# Patient Record
Sex: MALE | Race: WHITE | NOT HISPANIC OR LATINO | Employment: OTHER | URBAN - METROPOLITAN AREA
[De-identification: names, ages, dates, MRNs, and addresses within clinical notes are randomized per-mention and may not be internally consistent; named-entity substitution may affect disease eponyms.]

---

## 2017-05-11 ENCOUNTER — TRANSCRIBE ORDERS (OUTPATIENT)
Dept: LAB | Facility: HOSPITAL | Age: 82
End: 2017-05-11

## 2017-05-11 DIAGNOSIS — Z85.46 PERSONAL HISTORY OF MALIGNANT NEOPLASM OF PROSTATE: ICD-10-CM

## 2017-05-11 DIAGNOSIS — C67.9 MALIGNANT NEOPLASM OF URINARY BLADDER, UNSPECIFIED SITE (HCC): Primary | ICD-10-CM

## 2017-05-11 DIAGNOSIS — C61 MALIGNANT NEOPLASM OF PROSTATE (HCC): ICD-10-CM

## 2017-05-11 DIAGNOSIS — I85.00 ESOPHAGEAL VARICES WITHOUT BLEEDING, UNSPECIFIED ESOPHAGEAL VARICES TYPE (HCC): ICD-10-CM

## 2017-05-24 ENCOUNTER — APPOINTMENT (OUTPATIENT)
Dept: LAB | Facility: HOSPITAL | Age: 82
End: 2017-05-24
Attending: SPECIALIST
Payer: MEDICARE

## 2017-05-24 DIAGNOSIS — Z85.46 PERSONAL HISTORY OF MALIGNANT NEOPLASM OF PROSTATE: ICD-10-CM

## 2017-05-24 DIAGNOSIS — C67.9 MALIGNANT NEOPLASM OF BLADDER (HCC): ICD-10-CM

## 2017-05-24 DIAGNOSIS — C61 MALIGNANT NEOPLASM OF PROSTATE (HCC): ICD-10-CM

## 2017-05-24 LAB
ALBUMIN SERPL BCP-MCNC: 3.3 G/DL (ref 3.5–5)
ALP SERPL-CCNC: 56 U/L (ref 46–116)
ALT SERPL W P-5'-P-CCNC: 17 U/L (ref 12–78)
ANION GAP SERPL CALCULATED.3IONS-SCNC: 8 MMOL/L (ref 4–13)
AST SERPL W P-5'-P-CCNC: 14 U/L (ref 5–45)
BASOPHILS # BLD AUTO: 0.02 THOUSANDS/ΜL (ref 0–0.1)
BASOPHILS NFR BLD AUTO: 0 % (ref 0–1)
BILIRUB SERPL-MCNC: 0.44 MG/DL (ref 0.2–1)
BUN SERPL-MCNC: 14 MG/DL (ref 5–25)
CALCIUM SERPL-MCNC: 8.9 MG/DL (ref 8.3–10.1)
CHLORIDE SERPL-SCNC: 109 MMOL/L (ref 100–108)
CO2 SERPL-SCNC: 26 MMOL/L (ref 21–32)
CREAT SERPL-MCNC: 0.98 MG/DL (ref 0.6–1.3)
EOSINOPHIL # BLD AUTO: 0.09 THOUSAND/ΜL (ref 0–0.61)
EOSINOPHIL NFR BLD AUTO: 2 % (ref 0–6)
ERYTHROCYTE [DISTWIDTH] IN BLOOD BY AUTOMATED COUNT: 14.1 % (ref 11.6–15.1)
GFR SERPL CREATININE-BSD FRML MDRD: >60 ML/MIN/1.73SQ M
GLUCOSE P FAST SERPL-MCNC: 75 MG/DL (ref 65–99)
HCT VFR BLD AUTO: 39.5 % (ref 36.5–49.3)
HGB BLD-MCNC: 12.2 G/DL (ref 12–17)
LYMPHOCYTES # BLD AUTO: 1.03 THOUSANDS/ΜL (ref 0.6–4.47)
LYMPHOCYTES NFR BLD AUTO: 18 % (ref 14–44)
MCH RBC QN AUTO: 29.3 PG (ref 26.8–34.3)
MCHC RBC AUTO-ENTMCNC: 30.9 G/DL (ref 31.4–37.4)
MCV RBC AUTO: 95 FL (ref 82–98)
MONOCYTES # BLD AUTO: 0.42 THOUSAND/ΜL (ref 0.17–1.22)
MONOCYTES NFR BLD AUTO: 7 % (ref 4–12)
NEUTROPHILS # BLD AUTO: 4.1 THOUSANDS/ΜL (ref 1.85–7.62)
NEUTS SEG NFR BLD AUTO: 73 % (ref 43–75)
NRBC BLD AUTO-RTO: 0 /100 WBCS
PLATELET # BLD AUTO: 153 THOUSANDS/UL (ref 149–390)
PMV BLD AUTO: 11.6 FL (ref 8.9–12.7)
POTASSIUM SERPL-SCNC: 4.1 MMOL/L (ref 3.5–5.3)
PROT SERPL-MCNC: 6.7 G/DL (ref 6.4–8.2)
PSA SERPL-MCNC: <0.1 NG/ML (ref 0–4)
RBC # BLD AUTO: 4.16 MILLION/UL (ref 3.88–5.62)
SODIUM SERPL-SCNC: 143 MMOL/L (ref 136–145)
VENIPUNCTURE: NORMAL
WBC # BLD AUTO: 5.67 THOUSAND/UL (ref 4.31–10.16)

## 2017-05-24 PROCEDURE — 36415 COLL VENOUS BLD VENIPUNCTURE: CPT

## 2017-05-24 PROCEDURE — 85025 COMPLETE CBC W/AUTO DIFF WBC: CPT

## 2017-05-24 PROCEDURE — 84153 ASSAY OF PSA TOTAL: CPT

## 2017-05-24 PROCEDURE — 80053 COMPREHEN METABOLIC PANEL: CPT

## 2017-05-26 ENCOUNTER — TRANSCRIBE ORDERS (OUTPATIENT)
Dept: ADMINISTRATIVE | Facility: HOSPITAL | Age: 82
End: 2017-05-26

## 2017-05-26 ENCOUNTER — HOSPITAL ENCOUNTER (OUTPATIENT)
Dept: ULTRASOUND IMAGING | Facility: HOSPITAL | Age: 82
Discharge: HOME | End: 2017-05-26
Attending: UROLOGY
Payer: MEDICARE

## 2017-05-26 DIAGNOSIS — C67.9 MALIGNANT NEOPLASM OF URINARY BLADDER, UNSPECIFIED SITE (HCC): ICD-10-CM

## 2017-05-26 PROCEDURE — 76770 US EXAM ABDO BACK WALL COMP: CPT

## 2017-06-07 ENCOUNTER — ALLSCRIPTS OFFICE VISIT (OUTPATIENT)
Dept: OTHER | Facility: OTHER | Age: 82
End: 2017-06-07

## 2017-06-13 ENCOUNTER — ALLSCRIPTS OFFICE VISIT (OUTPATIENT)
Dept: OTHER | Facility: OTHER | Age: 82
End: 2017-06-13

## 2017-06-29 ENCOUNTER — TRANSCRIBE ORDERS (OUTPATIENT)
Dept: ADMINISTRATIVE | Facility: HOSPITAL | Age: 82
End: 2017-06-29

## 2017-06-29 DIAGNOSIS — IMO0002 MASS: Primary | ICD-10-CM

## 2017-07-05 ENCOUNTER — TRANSCRIBE ORDERS (OUTPATIENT)
Dept: LAB | Facility: HOSPITAL | Age: 82
End: 2017-07-05

## 2017-07-05 DIAGNOSIS — C61 MALIGNANT NEOPLASM OF PROSTATE (HCC): ICD-10-CM

## 2017-07-05 DIAGNOSIS — K63.89 CECUM MASS: Primary | ICD-10-CM

## 2017-07-06 ENCOUNTER — APPOINTMENT (OUTPATIENT)
Dept: LAB | Facility: HOSPITAL | Age: 82
End: 2017-07-06
Attending: INTERNAL MEDICINE
Payer: MEDICARE

## 2017-07-06 DIAGNOSIS — K63.89 CECUM MASS: ICD-10-CM

## 2017-07-06 LAB
ALBUMIN SERPL BCP-MCNC: 3.3 G/DL (ref 3.5–5)
ALP SERPL-CCNC: 60 U/L (ref 46–116)
ALT SERPL W P-5'-P-CCNC: 16 U/L (ref 12–78)
ANION GAP SERPL CALCULATED.3IONS-SCNC: 4 MMOL/L (ref 4–13)
APTT PPP: 29 SECONDS (ref 23–35)
AST SERPL W P-5'-P-CCNC: 15 U/L (ref 5–45)
BASOPHILS # BLD AUTO: 0.02 THOUSANDS/ΜL (ref 0–0.1)
BASOPHILS NFR BLD AUTO: 0 % (ref 0–1)
BILIRUB SERPL-MCNC: 0.5 MG/DL (ref 0.2–1)
BUN SERPL-MCNC: 15 MG/DL (ref 5–25)
CALCIUM SERPL-MCNC: 8.7 MG/DL (ref 8.3–10.1)
CEA SERPL-MCNC: 1.8 NG/ML (ref 0–3)
CHLORIDE SERPL-SCNC: 107 MMOL/L (ref 100–108)
CO2 SERPL-SCNC: 28 MMOL/L (ref 21–32)
CREAT SERPL-MCNC: 0.96 MG/DL (ref 0.6–1.3)
EOSINOPHIL # BLD AUTO: 0.06 THOUSAND/ΜL (ref 0–0.61)
EOSINOPHIL NFR BLD AUTO: 1 % (ref 0–6)
ERYTHROCYTE [DISTWIDTH] IN BLOOD BY AUTOMATED COUNT: 14.3 % (ref 11.6–15.1)
GFR SERPL CREATININE-BSD FRML MDRD: >60 ML/MIN/1.73SQ M
GLUCOSE P FAST SERPL-MCNC: 88 MG/DL (ref 65–99)
HCT VFR BLD AUTO: 39.2 % (ref 36.5–49.3)
HGB BLD-MCNC: 12.2 G/DL (ref 12–17)
INR PPP: 0.95 (ref 0.86–1.16)
LYMPHOCYTES # BLD AUTO: 1.14 THOUSANDS/ΜL (ref 0.6–4.47)
LYMPHOCYTES NFR BLD AUTO: 17 % (ref 14–44)
MCH RBC QN AUTO: 29 PG (ref 26.8–34.3)
MCHC RBC AUTO-ENTMCNC: 31.1 G/DL (ref 31.4–37.4)
MCV RBC AUTO: 93 FL (ref 82–98)
MONOCYTES # BLD AUTO: 0.49 THOUSAND/ΜL (ref 0.17–1.22)
MONOCYTES NFR BLD AUTO: 7 % (ref 4–12)
NEUTROPHILS # BLD AUTO: 4.87 THOUSANDS/ΜL (ref 1.85–7.62)
NEUTS SEG NFR BLD AUTO: 75 % (ref 43–75)
NRBC BLD AUTO-RTO: 0 /100 WBCS
PLATELET # BLD AUTO: 154 THOUSANDS/UL (ref 149–390)
PMV BLD AUTO: 11.1 FL (ref 8.9–12.7)
POTASSIUM SERPL-SCNC: 4.2 MMOL/L (ref 3.5–5.3)
PROT SERPL-MCNC: 6.7 G/DL (ref 6.4–8.2)
PROTHROMBIN TIME: 12.7 SECONDS (ref 12.1–14.4)
RBC # BLD AUTO: 4.21 MILLION/UL (ref 3.88–5.62)
SODIUM SERPL-SCNC: 139 MMOL/L (ref 136–145)
VENIPUNCTURE: NORMAL
WBC # BLD AUTO: 6.6 THOUSAND/UL (ref 4.31–10.16)

## 2017-07-06 PROCEDURE — 85025 COMPLETE CBC W/AUTO DIFF WBC: CPT

## 2017-07-06 PROCEDURE — 36415 COLL VENOUS BLD VENIPUNCTURE: CPT

## 2017-07-06 PROCEDURE — 85610 PROTHROMBIN TIME: CPT

## 2017-07-06 PROCEDURE — 82378 CARCINOEMBRYONIC ANTIGEN: CPT

## 2017-07-06 PROCEDURE — 85730 THROMBOPLASTIN TIME PARTIAL: CPT

## 2017-07-06 PROCEDURE — 80053 COMPREHEN METABOLIC PANEL: CPT

## 2017-07-14 ENCOUNTER — HOSPITAL ENCOUNTER (OUTPATIENT)
Dept: CT IMAGING | Facility: HOSPITAL | Age: 82
Discharge: HOME | End: 2017-07-14
Attending: INTERNAL MEDICINE
Payer: MEDICARE

## 2017-07-14 DIAGNOSIS — IMO0002 MASS: ICD-10-CM

## 2017-07-14 PROCEDURE — 74177 CT ABD & PELVIS W/CONTRAST: CPT

## 2017-07-14 RX ADMIN — IOHEXOL 100 ML: 350 INJECTION, SOLUTION INTRAVENOUS at 11:35

## 2017-07-19 ENCOUNTER — OFFICE VISIT (OUTPATIENT)
Dept: LAB | Facility: CLINIC | Age: 82
End: 2017-07-19
Payer: MEDICARE

## 2017-07-19 ENCOUNTER — APPOINTMENT (OUTPATIENT)
Dept: LAB | Facility: CLINIC | Age: 82
End: 2017-07-19
Payer: MEDICARE

## 2017-07-19 ENCOUNTER — LAB REQUISITION (OUTPATIENT)
Dept: LAB | Facility: HOSPITAL | Age: 82
DRG: 330 | End: 2017-07-19
Payer: MEDICARE

## 2017-07-19 ENCOUNTER — LAB CONVERSION - ENCOUNTER (OUTPATIENT)
Dept: OTHER | Facility: OTHER | Age: 82
End: 2017-07-19

## 2017-07-19 ENCOUNTER — TRANSCRIBE ORDERS (OUTPATIENT)
Dept: LAB | Facility: CLINIC | Age: 82
End: 2017-07-19

## 2017-07-19 DIAGNOSIS — C18.9 MALIGNANT NEOPLASM OF COLON, UNSPECIFIED PART OF COLON (HCC): ICD-10-CM

## 2017-07-19 DIAGNOSIS — K43.2 INCISIONAL HERNIA WITHOUT MENTION OF OBSTRUCTION OR GANGRENE: ICD-10-CM

## 2017-07-19 DIAGNOSIS — C18.9 MALIGNANT NEOPLASM OF COLON (HCC): ICD-10-CM

## 2017-07-19 DIAGNOSIS — K43.2 INCISIONAL HERNIA, WITHOUT OBSTRUCTION OR GANGRENE: ICD-10-CM

## 2017-07-19 DIAGNOSIS — K43.2 INCISIONAL HERNIA WITHOUT MENTION OF OBSTRUCTION OR GANGRENE: Primary | ICD-10-CM

## 2017-07-19 LAB
ABO GROUP BLD: NORMAL
ALBUMIN SERPL BCP-MCNC: 3.3 G/DL (ref 3.5–5)
ALP SERPL-CCNC: 59 U/L (ref 46–116)
ALT SERPL W P-5'-P-CCNC: 16 U/L (ref 12–78)
ANION GAP SERPL CALCULATED.3IONS-SCNC: 9 MMOL/L (ref 4–13)
AST SERPL W P-5'-P-CCNC: 17 U/L (ref 5–45)
BASOPHILS # BLD AUTO: 0.02 THOUSANDS/ΜL (ref 0–0.1)
BASOPHILS NFR BLD AUTO: 0 % (ref 0–1)
BILIRUB SERPL-MCNC: 0.4 MG/DL (ref 0.2–1)
BLD GP AB SCN SERPL QL: NEGATIVE
BUN SERPL-MCNC: 16 MG/DL (ref 5–25)
CALCIUM SERPL-MCNC: 8.9 MG/DL (ref 8.3–10.1)
CHLORIDE SERPL-SCNC: 103 MMOL/L (ref 100–108)
CO2 SERPL-SCNC: 29 MMOL/L (ref 21–32)
CREAT SERPL-MCNC: 1.23 MG/DL (ref 0.6–1.3)
EOSINOPHIL # BLD AUTO: 0.08 THOUSAND/ΜL (ref 0–0.61)
EOSINOPHIL NFR BLD AUTO: 1 % (ref 0–6)
ERYTHROCYTE [DISTWIDTH] IN BLOOD BY AUTOMATED COUNT: 14.3 % (ref 11.6–15.1)
EST. AVERAGE GLUCOSE BLD GHB EST-MCNC: 105 MG/DL
GFR SERPL CREATININE-BSD FRML MDRD: 56.2 ML/MIN/1.73SQ M
GLUCOSE SERPL-MCNC: 92 MG/DL (ref 65–140)
HBA1C MFR BLD: 5.3 % (ref 4.2–6.3)
HCT VFR BLD AUTO: 40 % (ref 36.5–49.3)
HGB BLD-MCNC: 12.4 G/DL (ref 12–17)
LYMPHOCYTES # BLD AUTO: 1.01 THOUSANDS/ΜL (ref 0.6–4.47)
LYMPHOCYTES NFR BLD AUTO: 17 % (ref 14–44)
MCH RBC QN AUTO: 29.2 PG (ref 26.8–34.3)
MCHC RBC AUTO-ENTMCNC: 31 G/DL (ref 31.4–37.4)
MCV RBC AUTO: 94 FL (ref 82–98)
MONOCYTES # BLD AUTO: 0.45 THOUSAND/ΜL (ref 0.17–1.22)
MONOCYTES NFR BLD AUTO: 7 % (ref 4–12)
NEUTROPHILS # BLD AUTO: 4.54 THOUSANDS/ΜL (ref 1.85–7.62)
NEUTS SEG NFR BLD AUTO: 75 % (ref 43–75)
PLATELET # BLD AUTO: 158 THOUSANDS/UL (ref 149–390)
PMV BLD AUTO: 11.1 FL (ref 8.9–12.7)
POTASSIUM SERPL-SCNC: 3.6 MMOL/L (ref 3.5–5.3)
PROT SERPL-MCNC: 6.8 G/DL (ref 6.4–8.2)
RBC # BLD AUTO: 4.25 MILLION/UL (ref 3.88–5.62)
RH BLD: POSITIVE
SODIUM SERPL-SCNC: 141 MMOL/L (ref 136–145)
SPECIMEN EXPIRATION DATE: NORMAL
WBC # BLD AUTO: 6.1 THOUSAND/UL (ref 4.31–10.16)

## 2017-07-19 PROCEDURE — 83036 HEMOGLOBIN GLYCOSYLATED A1C: CPT

## 2017-07-19 PROCEDURE — 86850 RBC ANTIBODY SCREEN: CPT | Performed by: SURGERY

## 2017-07-19 PROCEDURE — 86900 BLOOD TYPING SEROLOGIC ABO: CPT | Performed by: SURGERY

## 2017-07-19 PROCEDURE — 86901 BLOOD TYPING SEROLOGIC RH(D): CPT | Performed by: SURGERY

## 2017-07-19 PROCEDURE — 80053 COMPREHEN METABOLIC PANEL: CPT

## 2017-07-19 PROCEDURE — 85025 COMPLETE CBC W/AUTO DIFF WBC: CPT

## 2017-07-19 PROCEDURE — 93005 ELECTROCARDIOGRAM TRACING: CPT

## 2017-07-19 PROCEDURE — 36415 COLL VENOUS BLD VENIPUNCTURE: CPT

## 2017-07-20 LAB
ATRIAL RATE: 72 BPM
P AXIS: 40 DEGREES
PR INTERVAL: 154 MS
QRS AXIS: -32 DEGREES
QRSD INTERVAL: 96 MS
QT INTERVAL: 396 MS
QTC INTERVAL: 396 MS
T WAVE AXIS: -61 DEGREES
VENTRICULAR RATE: 60 BPM

## 2017-07-20 RX ORDER — MELATONIN
1000 DAILY
COMMUNITY

## 2017-07-20 RX ORDER — MULTIVITAMIN
1 TABLET ORAL DAILY
COMMUNITY

## 2017-07-21 ENCOUNTER — HOSPITAL ENCOUNTER (INPATIENT)
Facility: HOSPITAL | Age: 82
LOS: 8 days | Discharge: NURSING, REHAB OR LONG-TERM CARE | DRG: 330 | End: 2017-07-29
Attending: SURGERY | Admitting: SURGERY
Payer: MEDICARE

## 2017-07-21 ENCOUNTER — ANESTHESIA EVENT (OUTPATIENT)
Dept: PERIOP | Facility: HOSPITAL | Age: 82
DRG: 330 | End: 2017-07-21
Payer: MEDICARE

## 2017-07-21 ENCOUNTER — ANESTHESIA (OUTPATIENT)
Dept: PERIOP | Facility: HOSPITAL | Age: 82
DRG: 330 | End: 2017-07-21
Payer: MEDICARE

## 2017-07-21 DIAGNOSIS — C18.9 MALIGNANT NEOPLASM OF COLON (HCC): ICD-10-CM

## 2017-07-21 DIAGNOSIS — K43.5 PARASTOMAL HERNIA WITHOUT OBSTRUCTION OR GANGRENE: ICD-10-CM

## 2017-07-21 PROBLEM — E78.5 HYPERLIPIDEMIA: Status: ACTIVE | Noted: 2017-07-21

## 2017-07-21 PROBLEM — C67.9 BLADDER CANCER (HCC): Status: ACTIVE | Noted: 2017-07-21

## 2017-07-21 PROBLEM — C80.1 CANCER (HCC): Status: ACTIVE | Noted: 2017-07-21

## 2017-07-21 PROBLEM — I10 HYPERTENSION: Status: ACTIVE | Noted: 2017-07-21

## 2017-07-21 PROBLEM — F01.50 VASCULAR DEMENTIA (HCC): Status: ACTIVE | Noted: 2017-07-21

## 2017-07-21 PROBLEM — I47.1 SVT (SUPRAVENTRICULAR TACHYCARDIA) (HCC): Status: ACTIVE | Noted: 2017-07-21

## 2017-07-21 LAB
ANION GAP SERPL CALCULATED.3IONS-SCNC: 11 MMOL/L (ref 4–13)
ARTERIAL PATENCY WRIST A: YES
BASE EXCESS BLDA CALC-SCNC: -6 MMOL/L (ref -2–3)
BASE EXCESS BLDA CALC-SCNC: -6 MMOL/L (ref -2–3)
BASE EXCESS BLDA CALC-SCNC: -8.6 MMOL/L
BUN SERPL-MCNC: 11 MG/DL (ref 5–25)
CA-I BLD-SCNC: 1.05 MMOL/L (ref 1.12–1.32)
CA-I BLD-SCNC: 1.11 MMOL/L (ref 1.12–1.32)
CA-I BLD-SCNC: 1.18 MMOL/L (ref 1.12–1.32)
CALCIUM SERPL-MCNC: 7.6 MG/DL (ref 8.3–10.1)
CHLORIDE SERPL-SCNC: 111 MMOL/L (ref 100–108)
CO2 SERPL-SCNC: 20 MMOL/L (ref 21–32)
CREAT SERPL-MCNC: 0.95 MG/DL (ref 0.6–1.3)
ERYTHROCYTE [DISTWIDTH] IN BLOOD BY AUTOMATED COUNT: 14.1 % (ref 11.6–15.1)
GFR SERPL CREATININE-BSD FRML MDRD: >60 ML/MIN/1.73SQ M
GLUCOSE SERPL-MCNC: 157 MG/DL (ref 65–140)
GLUCOSE SERPL-MCNC: 164 MG/DL (ref 65–140)
GLUCOSE SERPL-MCNC: 177 MG/DL (ref 65–140)
GLUCOSE SERPL-MCNC: 182 MG/DL (ref 65–140)
HCO3 BLDA-SCNC: 17.2 MMOL/L (ref 22–28)
HCO3 BLDA-SCNC: 20.7 MMOL/L (ref 22–28)
HCO3 BLDA-SCNC: 21.5 MMOL/L (ref 24–30)
HCT VFR BLD AUTO: 31 % (ref 36.5–49.3)
HCT VFR BLD CALC: 27 % (ref 36.5–49.3)
HCT VFR BLD CALC: 29 % (ref 36.5–49.3)
HGB BLD-MCNC: 9.9 G/DL (ref 12–17)
HGB BLDA-MCNC: 9.2 G/DL (ref 12–17)
HGB BLDA-MCNC: 9.9 G/DL (ref 12–17)
LACTATE SERPL-SCNC: 1.8 MMOL/L (ref 0.5–2)
MAGNESIUM SERPL-MCNC: 1.8 MG/DL (ref 1.6–2.6)
MCH RBC QN AUTO: 29.6 PG (ref 26.8–34.3)
MCHC RBC AUTO-ENTMCNC: 31.9 G/DL (ref 31.4–37.4)
MCV RBC AUTO: 93 FL (ref 82–98)
NASAL CANNULA: 2
O2 CT BLDA-SCNC: 14.8 ML/DL (ref 16–23)
OXYHGB MFR BLDA: 96.2 % (ref 94–97)
PCO2 BLD: 22 MMOL/L (ref 21–32)
PCO2 BLD: 23 MMOL/L (ref 21–32)
PCO2 BLD: 42.9 MM HG (ref 36–44)
PCO2 BLD: 48.3 MM HG (ref 42–50)
PCO2 BLDA: 36.7 MM HG (ref 36–44)
PH BLD: 7.26 [PH] (ref 7.3–7.4)
PH BLD: 7.29 [PH] (ref 7.35–7.45)
PH BLDA: 7.29 [PH] (ref 7.35–7.45)
PHOSPHATE SERPL-MCNC: 3.1 MG/DL (ref 2.3–4.1)
PLATELET # BLD AUTO: 129 THOUSANDS/UL (ref 149–390)
PMV BLD AUTO: 10.8 FL (ref 8.9–12.7)
PO2 BLD: 413 MM HG (ref 75–129)
PO2 BLD: 506 MM HG (ref 35–45)
PO2 BLDA: 104.5 MM HG (ref 75–129)
POTASSIUM BLD-SCNC: 3.7 MMOL/L (ref 3.5–5.3)
POTASSIUM BLD-SCNC: 3.9 MMOL/L (ref 3.5–5.3)
POTASSIUM SERPL-SCNC: 3.8 MMOL/L (ref 3.5–5.3)
RBC # BLD AUTO: 3.34 MILLION/UL (ref 3.88–5.62)
SAO2 % BLD FROM PO2: 100 % (ref 95–98)
SAO2 % BLD FROM PO2: 100 % (ref 95–98)
SODIUM BLD-SCNC: 140 MMOL/L (ref 136–145)
SODIUM BLD-SCNC: 141 MMOL/L (ref 136–145)
SODIUM SERPL-SCNC: 142 MMOL/L (ref 136–145)
SPECIMEN SOURCE: ABNORMAL
WBC # BLD AUTO: 9.96 THOUSAND/UL (ref 4.31–10.16)

## 2017-07-21 PROCEDURE — 88341 IMHCHEM/IMCYTCHM EA ADD ANTB: CPT | Performed by: SURGERY

## 2017-07-21 PROCEDURE — 82947 ASSAY GLUCOSE BLOOD QUANT: CPT

## 2017-07-21 PROCEDURE — 82805 BLOOD GASES W/O2 SATURATION: CPT | Performed by: SURGERY

## 2017-07-21 PROCEDURE — 0DJD4ZZ INSPECTION OF LOWER INTESTINAL TRACT, PERCUTANEOUS ENDOSCOPIC APPROACH: ICD-10-PCS | Performed by: SURGERY

## 2017-07-21 PROCEDURE — 84295 ASSAY OF SERUM SODIUM: CPT

## 2017-07-21 PROCEDURE — 82330 ASSAY OF CALCIUM: CPT | Performed by: SURGERY

## 2017-07-21 PROCEDURE — 83605 ASSAY OF LACTIC ACID: CPT | Performed by: SURGERY

## 2017-07-21 PROCEDURE — 94760 N-INVAS EAR/PLS OXIMETRY 1: CPT

## 2017-07-21 PROCEDURE — 88342 IMHCHEM/IMCYTCHM 1ST ANTB: CPT | Performed by: SURGERY

## 2017-07-21 PROCEDURE — 84100 ASSAY OF PHOSPHORUS: CPT | Performed by: SURGERY

## 2017-07-21 PROCEDURE — 80048 BASIC METABOLIC PNL TOTAL CA: CPT | Performed by: SURGERY

## 2017-07-21 PROCEDURE — 85027 COMPLETE CBC AUTOMATED: CPT | Performed by: SURGERY

## 2017-07-21 PROCEDURE — 85014 HEMATOCRIT: CPT

## 2017-07-21 PROCEDURE — 83735 ASSAY OF MAGNESIUM: CPT | Performed by: SURGERY

## 2017-07-21 PROCEDURE — 82330 ASSAY OF CALCIUM: CPT

## 2017-07-21 PROCEDURE — 0WQF0ZZ REPAIR ABDOMINAL WALL, OPEN APPROACH: ICD-10-PCS | Performed by: SURGERY

## 2017-07-21 PROCEDURE — 82948 REAGENT STRIP/BLOOD GLUCOSE: CPT

## 2017-07-21 PROCEDURE — 82803 BLOOD GASES ANY COMBINATION: CPT

## 2017-07-21 PROCEDURE — 84132 ASSAY OF SERUM POTASSIUM: CPT

## 2017-07-21 PROCEDURE — 0DTF0ZZ RESECTION OF RIGHT LARGE INTESTINE, OPEN APPROACH: ICD-10-PCS | Performed by: SURGERY

## 2017-07-21 PROCEDURE — 0DNW0ZZ RELEASE PERITONEUM, OPEN APPROACH: ICD-10-PCS | Performed by: SURGERY

## 2017-07-21 PROCEDURE — C1765 ADHESION BARRIER: HCPCS | Performed by: SURGERY

## 2017-07-21 PROCEDURE — 88309 TISSUE EXAM BY PATHOLOGIST: CPT | Performed by: SURGERY

## 2017-07-21 RX ORDER — METOPROLOL TARTRATE 5 MG/5ML
5 INJECTION INTRAVENOUS EVERY 6 HOURS
Status: DISCONTINUED | OUTPATIENT
Start: 2017-07-21 | End: 2017-07-22

## 2017-07-21 RX ORDER — ONDANSETRON 2 MG/ML
4 INJECTION INTRAMUSCULAR; INTRAVENOUS EVERY 4 HOURS PRN
Status: DISCONTINUED | OUTPATIENT
Start: 2017-07-21 | End: 2017-07-29 | Stop reason: HOSPADM

## 2017-07-21 RX ORDER — SODIUM CHLORIDE, SODIUM LACTATE, POTASSIUM CHLORIDE, CALCIUM CHLORIDE 600; 310; 30; 20 MG/100ML; MG/100ML; MG/100ML; MG/100ML
100 INJECTION, SOLUTION INTRAVENOUS CONTINUOUS
Status: DISCONTINUED | OUTPATIENT
Start: 2017-07-21 | End: 2017-07-22

## 2017-07-21 RX ORDER — ALBUMIN, HUMAN INJ 5% 5 %
SOLUTION INTRAVENOUS CONTINUOUS PRN
Status: DISCONTINUED | OUTPATIENT
Start: 2017-07-21 | End: 2017-07-21 | Stop reason: SURG

## 2017-07-21 RX ORDER — CALCIUM CHLORIDE 100 MG/ML
INJECTION INTRAVENOUS; INTRAVENTRICULAR AS NEEDED
Status: DISCONTINUED | OUTPATIENT
Start: 2017-07-21 | End: 2017-07-21 | Stop reason: SURG

## 2017-07-21 RX ORDER — HEPARIN SODIUM 5000 [USP'U]/ML
5000 INJECTION, SOLUTION INTRAVENOUS; SUBCUTANEOUS EVERY 12 HOURS SCHEDULED
Status: DISCONTINUED | OUTPATIENT
Start: 2017-07-21 | End: 2017-07-23

## 2017-07-21 RX ORDER — MAGNESIUM HYDROXIDE 1200 MG/15ML
LIQUID ORAL AS NEEDED
Status: DISCONTINUED | OUTPATIENT
Start: 2017-07-21 | End: 2017-07-21 | Stop reason: HOSPADM

## 2017-07-21 RX ORDER — ROPIVACAINE HYDROCHLORIDE 2 MG/ML
INJECTION, SOLUTION EPIDURAL; INFILTRATION; PERINEURAL AS NEEDED
Status: DISCONTINUED | OUTPATIENT
Start: 2017-07-21 | End: 2017-07-21

## 2017-07-21 RX ORDER — FENTANYL CITRATE/PF 50 MCG/ML
25 SYRINGE (ML) INJECTION
Status: COMPLETED | OUTPATIENT
Start: 2017-07-21 | End: 2017-07-21

## 2017-07-21 RX ORDER — SODIUM CHLORIDE, SODIUM LACTATE, POTASSIUM CHLORIDE, CALCIUM CHLORIDE 600; 310; 30; 20 MG/100ML; MG/100ML; MG/100ML; MG/100ML
20 INJECTION, SOLUTION INTRAVENOUS CONTINUOUS
Status: DISCONTINUED | OUTPATIENT
Start: 2017-07-21 | End: 2017-07-21

## 2017-07-21 RX ORDER — SODIUM CHLORIDE, SODIUM GLUCONATE, SODIUM ACETATE, POTASSIUM CHLORIDE, MAGNESIUM CHLORIDE, SODIUM PHOSPHATE, DIBASIC, AND POTASSIUM PHOSPHATE .53; .5; .37; .037; .03; .012; .00082 G/100ML; G/100ML; G/100ML; G/100ML; G/100ML; G/100ML; G/100ML
1000 INJECTION, SOLUTION INTRAVENOUS ONCE
Status: COMPLETED | OUTPATIENT
Start: 2017-07-21 | End: 2017-07-22

## 2017-07-21 RX ORDER — LIDOCAINE HYDROCHLORIDE AND EPINEPHRINE 15; 5 MG/ML; UG/ML
INJECTION, SOLUTION EPIDURAL AS NEEDED
Status: DISCONTINUED | OUTPATIENT
Start: 2017-07-21 | End: 2017-07-21 | Stop reason: SURG

## 2017-07-21 RX ORDER — FENTANYL CITRATE 50 UG/ML
INJECTION, SOLUTION INTRAMUSCULAR; INTRAVENOUS AS NEEDED
Status: DISCONTINUED | OUTPATIENT
Start: 2017-07-21 | End: 2017-07-21 | Stop reason: SURG

## 2017-07-21 RX ORDER — ROPIVACAINE HYDROCHLORIDE 2 MG/ML
INJECTION, SOLUTION EPIDURAL; INFILTRATION; PERINEURAL AS NEEDED
Status: DISCONTINUED | OUTPATIENT
Start: 2017-07-21 | End: 2017-07-21 | Stop reason: SURG

## 2017-07-21 RX ORDER — NALOXONE HYDROCHLORIDE 0.4 MG/ML
0.1 INJECTION, SOLUTION INTRAMUSCULAR; INTRAVENOUS; SUBCUTANEOUS AS NEEDED
Status: DISCONTINUED | OUTPATIENT
Start: 2017-07-21 | End: 2017-07-29 | Stop reason: HOSPADM

## 2017-07-21 RX ORDER — ROCURONIUM BROMIDE 10 MG/ML
INJECTION, SOLUTION INTRAVENOUS AS NEEDED
Status: DISCONTINUED | OUTPATIENT
Start: 2017-07-21 | End: 2017-07-21 | Stop reason: SURG

## 2017-07-21 RX ORDER — PROPOFOL 10 MG/ML
INJECTION, EMULSION INTRAVENOUS AS NEEDED
Status: DISCONTINUED | OUTPATIENT
Start: 2017-07-21 | End: 2017-07-21 | Stop reason: SURG

## 2017-07-21 RX ORDER — SODIUM CHLORIDE 9 MG/ML
INJECTION, SOLUTION INTRAVENOUS CONTINUOUS PRN
Status: DISCONTINUED | OUTPATIENT
Start: 2017-07-21 | End: 2017-07-21 | Stop reason: SURG

## 2017-07-21 RX ORDER — LIDOCAINE HYDROCHLORIDE 10 MG/ML
INJECTION, SOLUTION INFILTRATION; PERINEURAL AS NEEDED
Status: DISCONTINUED | OUTPATIENT
Start: 2017-07-21 | End: 2017-07-21 | Stop reason: SURG

## 2017-07-21 RX ORDER — POTASSIUM CHLORIDE 14.9 MG/ML
20 INJECTION INTRAVENOUS
Status: COMPLETED | OUTPATIENT
Start: 2017-07-21 | End: 2017-07-22

## 2017-07-21 RX ORDER — ONDANSETRON 2 MG/ML
INJECTION INTRAMUSCULAR; INTRAVENOUS AS NEEDED
Status: DISCONTINUED | OUTPATIENT
Start: 2017-07-21 | End: 2017-07-21 | Stop reason: SURG

## 2017-07-21 RX ORDER — ACETAMINOPHEN 325 MG/1
650 TABLET ORAL EVERY 6 HOURS PRN
Status: DISCONTINUED | OUTPATIENT
Start: 2017-07-21 | End: 2017-07-29 | Stop reason: HOSPADM

## 2017-07-21 RX ORDER — GLYCOPYRROLATE 0.2 MG/ML
INJECTION INTRAMUSCULAR; INTRAVENOUS AS NEEDED
Status: DISCONTINUED | OUTPATIENT
Start: 2017-07-21 | End: 2017-07-21 | Stop reason: SURG

## 2017-07-21 RX ORDER — SODIUM CHLORIDE, SODIUM LACTATE, POTASSIUM CHLORIDE, CALCIUM CHLORIDE 600; 310; 30; 20 MG/100ML; MG/100ML; MG/100ML; MG/100ML
50 INJECTION, SOLUTION INTRAVENOUS CONTINUOUS
Status: DISCONTINUED | OUTPATIENT
Start: 2017-07-21 | End: 2017-07-21

## 2017-07-21 RX ADMIN — CEFAZOLIN SODIUM 1000 MG: 1 SOLUTION INTRAVENOUS at 16:40

## 2017-07-21 RX ADMIN — LIDOCAINE HYDROCHLORIDE 50 MG: 10 INJECTION, SOLUTION INFILTRATION; PERINEURAL at 16:38

## 2017-07-21 RX ADMIN — ALBUMIN HUMAN: 0.05 INJECTION, SOLUTION INTRAVENOUS at 17:30

## 2017-07-21 RX ADMIN — CALCIUM CHLORIDE 0.5 G: 100 INJECTION PARENTERAL at 17:50

## 2017-07-21 RX ADMIN — SODIUM CHLORIDE: 0.9 INJECTION, SOLUTION INTRAVENOUS at 15:55

## 2017-07-21 RX ADMIN — CEFAZOLIN SODIUM 1000 MG: 1 SOLUTION INTRAVENOUS at 12:42

## 2017-07-21 RX ADMIN — SODIUM CHLORIDE, SODIUM LACTATE, POTASSIUM CHLORIDE, AND CALCIUM CHLORIDE: .6; .31; .03; .02 INJECTION, SOLUTION INTRAVENOUS at 15:55

## 2017-07-21 RX ADMIN — NEOSTIGMINE METHYLSULFATE 3 MG: 1 INJECTION, SOLUTION INTRAMUSCULAR; INTRAVENOUS; SUBCUTANEOUS at 18:15

## 2017-07-21 RX ADMIN — HYDROMORPHONE HYDROCHLORIDE 0.2 MG: 1 INJECTION, SOLUTION INTRAMUSCULAR; INTRAVENOUS; SUBCUTANEOUS at 18:15

## 2017-07-21 RX ADMIN — ROCURONIUM BROMIDE 30 MG: 10 INJECTION, SOLUTION INTRAVENOUS at 12:59

## 2017-07-21 RX ADMIN — FENTANYL CITRATE 25 MCG: 50 INJECTION INTRAMUSCULAR; INTRAVENOUS at 19:07

## 2017-07-21 RX ADMIN — ROPIVACAINE HYDROCHLORIDE 20 ML: 2 INJECTION, SOLUTION EPIDURAL; INFILTRATION at 18:05

## 2017-07-21 RX ADMIN — CALCIUM GLUCONATE 1 G: 94 INJECTION, SOLUTION INTRAVENOUS at 22:48

## 2017-07-21 RX ADMIN — ROCURONIUM BROMIDE 20 MG: 10 INJECTION, SOLUTION INTRAVENOUS at 16:28

## 2017-07-21 RX ADMIN — SODIUM CHLORIDE: 0.9 INJECTION, SOLUTION INTRAVENOUS at 17:40

## 2017-07-21 RX ADMIN — ROCURONIUM BROMIDE 20 MG: 10 INJECTION, SOLUTION INTRAVENOUS at 15:29

## 2017-07-21 RX ADMIN — POTASSIUM CHLORIDE 20 MEQ: 200 INJECTION, SOLUTION INTRAVENOUS at 21:12

## 2017-07-21 RX ADMIN — HYDROMORPHONE HYDROCHLORIDE 0.4 MG: 1 INJECTION, SOLUTION INTRAMUSCULAR; INTRAVENOUS; SUBCUTANEOUS at 18:20

## 2017-07-21 RX ADMIN — ONDANSETRON 4 MG: 2 INJECTION INTRAMUSCULAR; INTRAVENOUS at 15:20

## 2017-07-21 RX ADMIN — SODIUM CHLORIDE, SODIUM LACTATE, POTASSIUM CHLORIDE, AND CALCIUM CHLORIDE 20 ML/HR: .6; .31; .03; .02 INJECTION, SOLUTION INTRAVENOUS at 11:31

## 2017-07-21 RX ADMIN — Medication: at 19:00

## 2017-07-21 RX ADMIN — FENTANYL CITRATE 25 MCG: 50 INJECTION, SOLUTION INTRAMUSCULAR; INTRAVENOUS at 11:59

## 2017-07-21 RX ADMIN — GLYCOPYRROLATE 0.6 MG: 0.2 INJECTION, SOLUTION INTRAMUSCULAR; INTRAVENOUS at 18:15

## 2017-07-21 RX ADMIN — FENTANYL CITRATE 25 MCG: 50 INJECTION INTRAMUSCULAR; INTRAVENOUS at 19:00

## 2017-07-21 RX ADMIN — HYDROMORPHONE HYDROCHLORIDE 0.4 MG: 1 INJECTION, SOLUTION INTRAMUSCULAR; INTRAVENOUS; SUBCUTANEOUS at 18:10

## 2017-07-21 RX ADMIN — LIDOCAINE HYDROCHLORIDE 50 MG: 10 INJECTION, SOLUTION INFILTRATION; PERINEURAL at 12:15

## 2017-07-21 RX ADMIN — HYDROMORPHONE HYDROCHLORIDE 0.4 MG: 1 INJECTION, SOLUTION INTRAMUSCULAR; INTRAVENOUS; SUBCUTANEOUS at 18:08

## 2017-07-21 RX ADMIN — METRONIDAZOLE 500 MG: 500 SOLUTION INTRAVENOUS at 12:57

## 2017-07-21 RX ADMIN — FENTANYL CITRATE 50 MCG: 50 INJECTION, SOLUTION INTRAMUSCULAR; INTRAVENOUS at 12:15

## 2017-07-21 RX ADMIN — METOPROLOL TARTRATE 5 MG: 5 INJECTION INTRAVENOUS at 21:15

## 2017-07-21 RX ADMIN — DEXAMETHASONE SODIUM PHOSPHATE 4 MG: 10 INJECTION INTRAMUSCULAR; INTRAVENOUS at 13:13

## 2017-07-21 RX ADMIN — FENTANYL CITRATE 25 MCG: 50 INJECTION INTRAMUSCULAR; INTRAVENOUS at 19:40

## 2017-07-21 RX ADMIN — ALBUMIN HUMAN: 0.05 INJECTION, SOLUTION INTRAVENOUS at 17:45

## 2017-07-21 RX ADMIN — FENTANYL CITRATE 25 MCG: 50 INJECTION INTRAMUSCULAR; INTRAVENOUS at 19:25

## 2017-07-21 RX ADMIN — HYDROMORPHONE HYDROCHLORIDE 0.2 MG: 1 INJECTION, SOLUTION INTRAMUSCULAR; INTRAVENOUS; SUBCUTANEOUS at 18:35

## 2017-07-21 RX ADMIN — SODIUM CHLORIDE, SODIUM GLUCONATE, SODIUM ACETATE, POTASSIUM CHLORIDE, MAGNESIUM CHLORIDE, SODIUM PHOSPHATE, DIBASIC, AND POTASSIUM PHOSPHATE 1000 ML: .53; .5; .37; .037; .03; .012; .00082 INJECTION, SOLUTION INTRAVENOUS at 21:13

## 2017-07-21 RX ADMIN — FENTANYL CITRATE 25 MCG: 50 INJECTION, SOLUTION INTRAMUSCULAR; INTRAVENOUS at 18:00

## 2017-07-21 RX ADMIN — SODIUM CHLORIDE: 0.9 INJECTION, SOLUTION INTRAVENOUS at 12:16

## 2017-07-21 RX ADMIN — HEPARIN SODIUM 5000 UNITS: 5000 INJECTION, SOLUTION INTRAVENOUS; SUBCUTANEOUS at 21:15

## 2017-07-21 RX ADMIN — PROPOFOL 100 MG: 10 INJECTION, EMULSION INTRAVENOUS at 12:15

## 2017-07-21 RX ADMIN — HYDROMORPHONE HYDROCHLORIDE 0.2 MG: 1 INJECTION, SOLUTION INTRAMUSCULAR; INTRAVENOUS; SUBCUTANEOUS at 18:23

## 2017-07-21 RX ADMIN — SODIUM CHLORIDE, SODIUM LACTATE, POTASSIUM CHLORIDE, AND CALCIUM CHLORIDE 100 ML/HR: .6; .31; .03; .02 INJECTION, SOLUTION INTRAVENOUS at 19:56

## 2017-07-21 RX ADMIN — POTASSIUM CHLORIDE 20 MEQ: 200 INJECTION, SOLUTION INTRAVENOUS at 22:46

## 2017-07-21 RX ADMIN — ROCURONIUM BROMIDE 50 MG: 10 INJECTION, SOLUTION INTRAVENOUS at 12:16

## 2017-07-21 RX ADMIN — LIDOCAINE HYDROCHLORIDE AND EPINEPHRINE 5 ML: 15; 5 INJECTION, SOLUTION EPIDURAL at 12:49

## 2017-07-21 RX ADMIN — HYDROMORPHONE HYDROCHLORIDE 0.2 MG: 1 INJECTION, SOLUTION INTRAMUSCULAR; INTRAVENOUS; SUBCUTANEOUS at 18:30

## 2017-07-22 PROBLEM — K63.89 COLONIC MASS: Status: ACTIVE | Noted: 2017-07-22

## 2017-07-22 LAB
ANION GAP SERPL CALCULATED.3IONS-SCNC: 7 MMOL/L (ref 4–13)
ARTERIAL PATENCY WRIST A: YES
BASE EXCESS BLDA CALC-SCNC: -2.7 MMOL/L
BASE EXCESS BLDA CALC-SCNC: -4.3 MMOL/L
BUN SERPL-MCNC: 9 MG/DL (ref 5–25)
CALCIUM SERPL-MCNC: 7.7 MG/DL (ref 8.3–10.1)
CHLORIDE SERPL-SCNC: 110 MMOL/L (ref 100–108)
CO2 SERPL-SCNC: 25 MMOL/L (ref 21–32)
CREAT SERPL-MCNC: 0.95 MG/DL (ref 0.6–1.3)
ERYTHROCYTE [DISTWIDTH] IN BLOOD BY AUTOMATED COUNT: 14.1 % (ref 11.6–15.1)
GFR SERPL CREATININE-BSD FRML MDRD: >60 ML/MIN/1.73SQ M
GLUCOSE SERPL-MCNC: 115 MG/DL (ref 65–140)
HCO3 BLDA-SCNC: 20 MMOL/L (ref 22–28)
HCO3 BLDA-SCNC: 21.2 MMOL/L (ref 22–28)
HCT VFR BLD AUTO: 28.9 % (ref 36.5–49.3)
HGB BLD-MCNC: 9.4 G/DL (ref 12–17)
MAGNESIUM SERPL-MCNC: 1.9 MG/DL (ref 1.6–2.6)
MCH RBC QN AUTO: 30 PG (ref 26.8–34.3)
MCHC RBC AUTO-ENTMCNC: 32.5 G/DL (ref 31.4–37.4)
MCV RBC AUTO: 92 FL (ref 82–98)
NASAL CANNULA: 3
NASAL CANNULA: 3
O2 CT BLDA-SCNC: 13.9 ML/DL (ref 16–23)
O2 CT BLDA-SCNC: 14 ML/DL (ref 16–23)
OXYHGB MFR BLDA: 97.5 % (ref 94–97)
OXYHGB MFR BLDA: 97.8 % (ref 94–97)
PCO2 BLDA: 33.5 MM HG (ref 36–44)
PCO2 BLDA: 33.8 MM HG (ref 36–44)
PH BLDA: 7.39 [PH] (ref 7.35–7.45)
PH BLDA: 7.42 [PH] (ref 7.35–7.45)
PHOSPHATE SERPL-MCNC: 1.9 MG/DL (ref 2.3–4.1)
PLATELET # BLD AUTO: 111 THOUSANDS/UL (ref 149–390)
PMV BLD AUTO: 11 FL (ref 8.9–12.7)
PO2 BLDA: 111.7 MM HG (ref 75–129)
PO2 BLDA: 126.1 MM HG (ref 75–129)
POTASSIUM SERPL-SCNC: 4.1 MMOL/L (ref 3.5–5.3)
RBC # BLD AUTO: 3.13 MILLION/UL (ref 3.88–5.62)
SODIUM SERPL-SCNC: 142 MMOL/L (ref 136–145)
SPECIMEN SOURCE: ABNORMAL
WBC # BLD AUTO: 9.71 THOUSAND/UL (ref 4.31–10.16)

## 2017-07-22 PROCEDURE — 85027 COMPLETE CBC AUTOMATED: CPT | Performed by: SURGERY

## 2017-07-22 PROCEDURE — 82805 BLOOD GASES W/O2 SATURATION: CPT | Performed by: SURGERY

## 2017-07-22 PROCEDURE — 83735 ASSAY OF MAGNESIUM: CPT | Performed by: SURGERY

## 2017-07-22 PROCEDURE — 84100 ASSAY OF PHOSPHORUS: CPT | Performed by: SURGERY

## 2017-07-22 PROCEDURE — 80048 BASIC METABOLIC PNL TOTAL CA: CPT | Performed by: SURGERY

## 2017-07-22 PROCEDURE — 36600 WITHDRAWAL OF ARTERIAL BLOOD: CPT

## 2017-07-22 RX ORDER — SODIUM CHLORIDE, SODIUM GLUCONATE, SODIUM ACETATE, POTASSIUM CHLORIDE, MAGNESIUM CHLORIDE, SODIUM PHOSPHATE, DIBASIC, AND POTASSIUM PHOSPHATE .53; .5; .37; .037; .03; .012; .00082 G/100ML; G/100ML; G/100ML; G/100ML; G/100ML; G/100ML; G/100ML
500 INJECTION, SOLUTION INTRAVENOUS ONCE
Status: COMPLETED | OUTPATIENT
Start: 2017-07-22 | End: 2017-07-22

## 2017-07-22 RX ORDER — DEXTROSE, SODIUM CHLORIDE, AND POTASSIUM CHLORIDE 5; .45; .15 G/100ML; G/100ML; G/100ML
50 INJECTION INTRAVENOUS CONTINUOUS
Status: DISCONTINUED | OUTPATIENT
Start: 2017-07-22 | End: 2017-07-28

## 2017-07-22 RX ORDER — LANOLIN ALCOHOL/MO/W.PET/CERES
3 CREAM (GRAM) TOPICAL
Status: DISCONTINUED | OUTPATIENT
Start: 2017-07-22 | End: 2017-07-23

## 2017-07-22 RX ORDER — HALOPERIDOL 5 MG/ML
0.5 INJECTION INTRAMUSCULAR ONCE
Status: COMPLETED | OUTPATIENT
Start: 2017-07-22 | End: 2017-07-22

## 2017-07-22 RX ORDER — MAGNESIUM SULFATE 1 G/100ML
1 INJECTION INTRAVENOUS ONCE
Status: COMPLETED | OUTPATIENT
Start: 2017-07-22 | End: 2017-07-22

## 2017-07-22 RX ADMIN — HEPARIN SODIUM 5000 UNITS: 5000 INJECTION, SOLUTION INTRAVENOUS; SUBCUTANEOUS at 08:31

## 2017-07-22 RX ADMIN — HEPARIN SODIUM 5000 UNITS: 5000 INJECTION, SOLUTION INTRAVENOUS; SUBCUTANEOUS at 22:02

## 2017-07-22 RX ADMIN — POTASSIUM PHOSPHATE, MONOBASIC AND POTASSIUM PHOSPHATE, DIBASIC 12 MMOL: 224; 236 INJECTION, SOLUTION INTRAVENOUS at 08:31

## 2017-07-22 RX ADMIN — DEXTROSE, SODIUM CHLORIDE, AND POTASSIUM CHLORIDE 50 ML/HR: 5; .45; .15 INJECTION INTRAVENOUS at 23:46

## 2017-07-22 RX ADMIN — METOPROLOL TARTRATE 5 MG: 5 INJECTION INTRAVENOUS at 03:17

## 2017-07-22 RX ADMIN — HALOPERIDOL LACTATE 0.5 MG: 5 INJECTION, SOLUTION INTRAMUSCULAR at 23:13

## 2017-07-22 RX ADMIN — Medication: at 03:09

## 2017-07-22 RX ADMIN — SODIUM CHLORIDE, SODIUM LACTATE, POTASSIUM CHLORIDE, AND CALCIUM CHLORIDE 100 ML/HR: .6; .31; .03; .02 INJECTION, SOLUTION INTRAVENOUS at 04:19

## 2017-07-22 RX ADMIN — SODIUM CHLORIDE, SODIUM GLUCONATE, SODIUM ACETATE, POTASSIUM CHLORIDE, MAGNESIUM CHLORIDE, SODIUM PHOSPHATE, DIBASIC, AND POTASSIUM PHOSPHATE 500 ML: .53; .5; .37; .037; .03; .012; .00082 INJECTION, SOLUTION INTRAVENOUS at 01:05

## 2017-07-22 RX ADMIN — SODIUM CHLORIDE, SODIUM LACTATE, POTASSIUM CHLORIDE, AND CALCIUM CHLORIDE 100 ML/HR: .6; .31; .03; .02 INJECTION, SOLUTION INTRAVENOUS at 14:26

## 2017-07-22 RX ADMIN — METOPROLOL TARTRATE 12.5 MG: 25 TABLET ORAL at 21:59

## 2017-07-22 RX ADMIN — MELATONIN TAB 3 MG 3 MG: 3 TAB at 22:01

## 2017-07-22 RX ADMIN — MAGNESIUM SULFATE HEPTAHYDRATE 1 G: 1 INJECTION, SOLUTION INTRAVENOUS at 08:31

## 2017-07-23 ENCOUNTER — GENERIC CONVERSION - ENCOUNTER (OUTPATIENT)
Dept: OTHER | Facility: OTHER | Age: 82
End: 2017-07-23

## 2017-07-23 ENCOUNTER — APPOINTMENT (INPATIENT)
Dept: NON INVASIVE DIAGNOSTICS | Facility: HOSPITAL | Age: 82
DRG: 330 | End: 2017-07-23
Payer: MEDICARE

## 2017-07-23 LAB
ANION GAP SERPL CALCULATED.3IONS-SCNC: 9 MMOL/L (ref 4–13)
BASOPHILS # BLD MANUAL: 0 THOUSAND/UL (ref 0–0.1)
BASOPHILS NFR MAR MANUAL: 0 % (ref 0–1)
BUN SERPL-MCNC: 11 MG/DL (ref 5–25)
CALCIUM SERPL-MCNC: 8 MG/DL (ref 8.3–10.1)
CHLORIDE SERPL-SCNC: 109 MMOL/L (ref 100–108)
CO2 SERPL-SCNC: 23 MMOL/L (ref 21–32)
CREAT SERPL-MCNC: 0.88 MG/DL (ref 0.6–1.3)
EOSINOPHIL # BLD MANUAL: 0 THOUSAND/UL (ref 0–0.4)
EOSINOPHIL NFR BLD MANUAL: 0 % (ref 0–6)
ERYTHROCYTE [DISTWIDTH] IN BLOOD BY AUTOMATED COUNT: 14.5 % (ref 11.6–15.1)
GFR SERPL CREATININE-BSD FRML MDRD: >60 ML/MIN/1.73SQ M
GLUCOSE SERPL-MCNC: 130 MG/DL (ref 65–140)
HCT VFR BLD AUTO: 30.3 % (ref 36.5–49.3)
HGB BLD-MCNC: 9.6 G/DL (ref 12–17)
LYMPHOCYTES # BLD AUTO: 0.12 THOUSAND/UL (ref 0.6–4.47)
LYMPHOCYTES # BLD AUTO: 1 % (ref 14–44)
MACROCYTES BLD QL AUTO: PRESENT
MAGNESIUM SERPL-MCNC: 2.3 MG/DL (ref 1.6–2.6)
MCH RBC QN AUTO: 29.2 PG (ref 26.8–34.3)
MCHC RBC AUTO-ENTMCNC: 31.7 G/DL (ref 31.4–37.4)
MCV RBC AUTO: 92 FL (ref 82–98)
MONOCYTES # BLD AUTO: 0.81 THOUSAND/UL (ref 0–1.22)
MONOCYTES NFR BLD: 7 % (ref 4–12)
NEUTROPHILS # BLD MANUAL: 10.63 THOUSAND/UL (ref 1.85–7.62)
NEUTS BAND NFR BLD MANUAL: 2 % (ref 0–8)
NEUTS SEG NFR BLD AUTO: 90 % (ref 43–75)
NRBC BLD AUTO-RTO: 0 /100 WBCS
PHOSPHATE SERPL-MCNC: 2 MG/DL (ref 2.3–4.1)
PLATELET # BLD AUTO: 112 THOUSANDS/UL (ref 149–390)
PLATELET BLD QL SMEAR: ABNORMAL
PMV BLD AUTO: 11.3 FL (ref 8.9–12.7)
POIKILOCYTOSIS BLD QL SMEAR: PRESENT
POTASSIUM SERPL-SCNC: 3.8 MMOL/L (ref 3.5–5.3)
RBC # BLD AUTO: 3.29 MILLION/UL (ref 3.88–5.62)
RBC MORPH BLD: PRESENT
SODIUM SERPL-SCNC: 141 MMOL/L (ref 136–145)
WBC # BLD AUTO: 11.55 THOUSAND/UL (ref 4.31–10.16)

## 2017-07-23 PROCEDURE — C8929 TTE W OR WO FOL WCON,DOPPLER: HCPCS

## 2017-07-23 PROCEDURE — 84100 ASSAY OF PHOSPHORUS: CPT | Performed by: PHYSICIAN ASSISTANT

## 2017-07-23 PROCEDURE — 85007 BL SMEAR W/DIFF WBC COUNT: CPT | Performed by: PHYSICIAN ASSISTANT

## 2017-07-23 PROCEDURE — 94668 MNPJ CHEST WALL SBSQ: CPT

## 2017-07-23 PROCEDURE — 85027 COMPLETE CBC AUTOMATED: CPT | Performed by: PHYSICIAN ASSISTANT

## 2017-07-23 PROCEDURE — 83735 ASSAY OF MAGNESIUM: CPT | Performed by: PHYSICIAN ASSISTANT

## 2017-07-23 PROCEDURE — 80048 BASIC METABOLIC PNL TOTAL CA: CPT | Performed by: PHYSICIAN ASSISTANT

## 2017-07-23 PROCEDURE — 94760 N-INVAS EAR/PLS OXIMETRY 1: CPT

## 2017-07-23 RX ORDER — HEPARIN SODIUM 5000 [USP'U]/ML
5000 INJECTION, SOLUTION INTRAVENOUS; SUBCUTANEOUS EVERY 8 HOURS SCHEDULED
Status: DISCONTINUED | OUTPATIENT
Start: 2017-07-23 | End: 2017-07-26

## 2017-07-23 RX ORDER — LANOLIN ALCOHOL/MO/W.PET/CERES
3 CREAM (GRAM) TOPICAL
Status: DISCONTINUED | OUTPATIENT
Start: 2017-07-23 | End: 2017-07-25

## 2017-07-23 RX ADMIN — HEPARIN SODIUM 5000 UNITS: 5000 INJECTION, SOLUTION INTRAVENOUS; SUBCUTANEOUS at 21:38

## 2017-07-23 RX ADMIN — Medication: at 03:21

## 2017-07-23 RX ADMIN — PERFLUTREN 1 ML/MIN: 6.52 INJECTION, SUSPENSION INTRAVENOUS at 11:29

## 2017-07-23 RX ADMIN — HEPARIN SODIUM 5000 UNITS: 5000 INJECTION, SOLUTION INTRAVENOUS; SUBCUTANEOUS at 08:20

## 2017-07-23 RX ADMIN — ONDANSETRON 4 MG: 2 INJECTION INTRAMUSCULAR; INTRAVENOUS at 16:36

## 2017-07-23 RX ADMIN — METOPROLOL TARTRATE 12.5 MG: 25 TABLET ORAL at 08:19

## 2017-07-23 RX ADMIN — POTASSIUM PHOSPHATE, MONOBASIC AND POTASSIUM PHOSPHATE, DIBASIC 21 MMOL: 224; 236 INJECTION, SOLUTION INTRAVENOUS at 09:56

## 2017-07-23 RX ADMIN — METOPROLOL TARTRATE 12.5 MG: 25 TABLET ORAL at 21:38

## 2017-07-23 RX ADMIN — MELATONIN TAB 3 MG 3 MG: 3 TAB at 21:38

## 2017-07-23 RX ADMIN — HEPARIN SODIUM 5000 UNITS: 5000 INJECTION, SOLUTION INTRAVENOUS; SUBCUTANEOUS at 15:08

## 2017-07-23 RX ADMIN — DEXTROSE, SODIUM CHLORIDE, AND POTASSIUM CHLORIDE 50 ML/HR: 5; .45; .15 INJECTION INTRAVENOUS at 19:12

## 2017-07-24 LAB
ANION GAP SERPL CALCULATED.3IONS-SCNC: 10 MMOL/L (ref 4–13)
BASOPHILS # BLD AUTO: 0 THOUSANDS/ΜL (ref 0–0.1)
BASOPHILS NFR BLD AUTO: 0 % (ref 0–1)
BUN SERPL-MCNC: 15 MG/DL (ref 5–25)
CALCIUM SERPL-MCNC: 7.9 MG/DL (ref 8.3–10.1)
CHLORIDE SERPL-SCNC: 110 MMOL/L (ref 100–108)
CHOLEST SERPL-MCNC: 98 MG/DL (ref 50–200)
CO2 SERPL-SCNC: 20 MMOL/L (ref 21–32)
CREAT SERPL-MCNC: 0.83 MG/DL (ref 0.6–1.3)
EOSINOPHIL # BLD AUTO: 0 THOUSAND/ΜL (ref 0–0.61)
EOSINOPHIL NFR BLD AUTO: 0 % (ref 0–6)
ERYTHROCYTE [DISTWIDTH] IN BLOOD BY AUTOMATED COUNT: 14.8 % (ref 11.6–15.1)
GFR SERPL CREATININE-BSD FRML MDRD: >60 ML/MIN/1.73SQ M
GLUCOSE SERPL-MCNC: 93 MG/DL (ref 65–140)
HCT VFR BLD AUTO: 30.4 % (ref 36.5–49.3)
HDLC SERPL-MCNC: 36 MG/DL (ref 40–60)
HGB BLD-MCNC: 9.9 G/DL (ref 12–17)
LDLC SERPL CALC-MCNC: 43 MG/DL (ref 0–100)
LYMPHOCYTES # BLD AUTO: 0.53 THOUSANDS/ΜL (ref 0.6–4.47)
LYMPHOCYTES NFR BLD AUTO: 6 % (ref 14–44)
MCH RBC QN AUTO: 30.1 PG (ref 26.8–34.3)
MCHC RBC AUTO-ENTMCNC: 32.6 G/DL (ref 31.4–37.4)
MCV RBC AUTO: 92 FL (ref 82–98)
MONOCYTES # BLD AUTO: 0.87 THOUSAND/ΜL (ref 0.17–1.22)
MONOCYTES NFR BLD AUTO: 9 % (ref 4–12)
NEUTROPHILS # BLD AUTO: 8.11 THOUSANDS/ΜL (ref 1.85–7.62)
NEUTS SEG NFR BLD AUTO: 85 % (ref 43–75)
NRBC BLD AUTO-RTO: 0 /100 WBCS
PLATELET # BLD AUTO: 101 THOUSANDS/UL (ref 149–390)
PMV BLD AUTO: 11.7 FL (ref 8.9–12.7)
POTASSIUM SERPL-SCNC: 4.1 MMOL/L (ref 3.5–5.3)
PREALB SERPL-MCNC: 9.9 MG/DL (ref 18–40)
RBC # BLD AUTO: 3.29 MILLION/UL (ref 3.88–5.62)
SODIUM SERPL-SCNC: 140 MMOL/L (ref 136–145)
TRIGL SERPL-MCNC: 97 MG/DL
WBC # BLD AUTO: 9.58 THOUSAND/UL (ref 4.31–10.16)

## 2017-07-24 PROCEDURE — 97167 OT EVAL HIGH COMPLEX 60 MIN: CPT

## 2017-07-24 PROCEDURE — 85025 COMPLETE CBC W/AUTO DIFF WBC: CPT | Performed by: SURGERY

## 2017-07-24 PROCEDURE — G8978 MOBILITY CURRENT STATUS: HCPCS

## 2017-07-24 PROCEDURE — 80061 LIPID PANEL: CPT | Performed by: INTERNAL MEDICINE

## 2017-07-24 PROCEDURE — G8988 SELF CARE GOAL STATUS: HCPCS

## 2017-07-24 PROCEDURE — 97163 PT EVAL HIGH COMPLEX 45 MIN: CPT

## 2017-07-24 PROCEDURE — 94668 MNPJ CHEST WALL SBSQ: CPT

## 2017-07-24 PROCEDURE — G8987 SELF CARE CURRENT STATUS: HCPCS

## 2017-07-24 PROCEDURE — 80048 BASIC METABOLIC PNL TOTAL CA: CPT | Performed by: SURGERY

## 2017-07-24 PROCEDURE — G8979 MOBILITY GOAL STATUS: HCPCS

## 2017-07-24 PROCEDURE — 84134 ASSAY OF PREALBUMIN: CPT | Performed by: SURGERY

## 2017-07-24 RX ORDER — ATORVASTATIN CALCIUM 20 MG/1
20 TABLET, FILM COATED ORAL
Status: DISCONTINUED | OUTPATIENT
Start: 2017-07-24 | End: 2017-07-25

## 2017-07-24 RX ADMIN — HEPARIN SODIUM 5000 UNITS: 5000 INJECTION, SOLUTION INTRAVENOUS; SUBCUTANEOUS at 06:13

## 2017-07-24 RX ADMIN — Medication: at 06:45

## 2017-07-24 RX ADMIN — HEPARIN SODIUM 5000 UNITS: 5000 INJECTION, SOLUTION INTRAVENOUS; SUBCUTANEOUS at 21:20

## 2017-07-24 RX ADMIN — HEPARIN SODIUM 5000 UNITS: 5000 INJECTION, SOLUTION INTRAVENOUS; SUBCUTANEOUS at 13:44

## 2017-07-24 RX ADMIN — DEXTROSE, SODIUM CHLORIDE, AND POTASSIUM CHLORIDE 50 ML/HR: 5; .45; .15 INJECTION INTRAVENOUS at 13:50

## 2017-07-24 RX ADMIN — METOPROLOL TARTRATE 12.5 MG: 25 TABLET ORAL at 08:51

## 2017-07-25 ENCOUNTER — APPOINTMENT (INPATIENT)
Dept: RADIOLOGY | Facility: HOSPITAL | Age: 82
DRG: 330 | End: 2017-07-25
Payer: MEDICARE

## 2017-07-25 LAB
ANION GAP SERPL CALCULATED.3IONS-SCNC: 7 MMOL/L (ref 4–13)
BUN SERPL-MCNC: 18 MG/DL (ref 5–25)
CALCIUM SERPL-MCNC: 8.1 MG/DL (ref 8.3–10.1)
CHLORIDE SERPL-SCNC: 109 MMOL/L (ref 100–108)
CO2 SERPL-SCNC: 26 MMOL/L (ref 21–32)
CREAT SERPL-MCNC: 0.78 MG/DL (ref 0.6–1.3)
ERYTHROCYTE [DISTWIDTH] IN BLOOD BY AUTOMATED COUNT: 14.7 % (ref 11.6–15.1)
GFR SERPL CREATININE-BSD FRML MDRD: 83 ML/MIN/1.73SQ M
GLUCOSE SERPL-MCNC: 106 MG/DL (ref 65–140)
HCT VFR BLD AUTO: 31.4 % (ref 36.5–49.3)
HGB BLD-MCNC: 9.9 G/DL (ref 12–17)
MCH RBC QN AUTO: 29.1 PG (ref 26.8–34.3)
MCHC RBC AUTO-ENTMCNC: 31.5 G/DL (ref 31.4–37.4)
MCV RBC AUTO: 92 FL (ref 82–98)
PLATELET # BLD AUTO: 113 THOUSANDS/UL (ref 149–390)
PMV BLD AUTO: 11 FL (ref 8.9–12.7)
POTASSIUM SERPL-SCNC: 3.7 MMOL/L (ref 3.5–5.3)
RBC # BLD AUTO: 3.4 MILLION/UL (ref 3.88–5.62)
SODIUM SERPL-SCNC: 142 MMOL/L (ref 136–145)
WBC # BLD AUTO: 5.97 THOUSAND/UL (ref 4.31–10.16)

## 2017-07-25 PROCEDURE — 94668 MNPJ CHEST WALL SBSQ: CPT

## 2017-07-25 PROCEDURE — 80048 BASIC METABOLIC PNL TOTAL CA: CPT | Performed by: SURGERY

## 2017-07-25 PROCEDURE — C9113 INJ PANTOPRAZOLE SODIUM, VIA: HCPCS | Performed by: SURGERY

## 2017-07-25 PROCEDURE — 94760 N-INVAS EAR/PLS OXIMETRY 1: CPT

## 2017-07-25 PROCEDURE — 85027 COMPLETE CBC AUTOMATED: CPT | Performed by: SURGERY

## 2017-07-25 PROCEDURE — 74022 RADEX COMPL AQT ABD SERIES: CPT

## 2017-07-25 RX ORDER — PANTOPRAZOLE SODIUM 40 MG/1
40 INJECTION, POWDER, FOR SOLUTION INTRAVENOUS
Status: DISCONTINUED | OUTPATIENT
Start: 2017-07-25 | End: 2017-07-29 | Stop reason: ALTCHOICE

## 2017-07-25 RX ORDER — POTASSIUM CHLORIDE 29.8 MG/ML
40 INJECTION INTRAVENOUS ONCE
Status: DISCONTINUED | OUTPATIENT
Start: 2017-07-25 | End: 2017-07-25

## 2017-07-25 RX ORDER — POTASSIUM CHLORIDE 20MEQ/15ML
40 LIQUID (ML) ORAL ONCE
Status: COMPLETED | OUTPATIENT
Start: 2017-07-25 | End: 2017-07-25

## 2017-07-25 RX ADMIN — PANTOPRAZOLE SODIUM 40 MG: 40 INJECTION, POWDER, FOR SOLUTION INTRAVENOUS at 17:24

## 2017-07-25 RX ADMIN — METOPROLOL TARTRATE 12.5 MG: 25 TABLET ORAL at 09:39

## 2017-07-25 RX ADMIN — HEPARIN SODIUM 5000 UNITS: 5000 INJECTION, SOLUTION INTRAVENOUS; SUBCUTANEOUS at 14:27

## 2017-07-25 RX ADMIN — HEPARIN SODIUM 5000 UNITS: 5000 INJECTION, SOLUTION INTRAVENOUS; SUBCUTANEOUS at 22:03

## 2017-07-25 RX ADMIN — POTASSIUM CHLORIDE 40 MEQ: 20 SOLUTION ORAL at 09:45

## 2017-07-25 RX ADMIN — Medication: at 07:33

## 2017-07-25 RX ADMIN — HEPARIN SODIUM 5000 UNITS: 5000 INJECTION, SOLUTION INTRAVENOUS; SUBCUTANEOUS at 05:16

## 2017-07-25 RX ADMIN — DEXTROSE, SODIUM CHLORIDE, AND POTASSIUM CHLORIDE 50 ML/HR: 5; .45; .15 INJECTION INTRAVENOUS at 09:44

## 2017-07-26 LAB
ANION GAP SERPL CALCULATED.3IONS-SCNC: 6 MMOL/L (ref 4–13)
BASOPHILS # BLD AUTO: 0.01 THOUSANDS/ΜL (ref 0–0.1)
BASOPHILS NFR BLD AUTO: 0 % (ref 0–1)
BUN SERPL-MCNC: 16 MG/DL (ref 5–25)
CALCIUM SERPL-MCNC: 7.9 MG/DL (ref 8.3–10.1)
CHLORIDE SERPL-SCNC: 112 MMOL/L (ref 100–108)
CO2 SERPL-SCNC: 25 MMOL/L (ref 21–32)
CREAT SERPL-MCNC: 0.73 MG/DL (ref 0.6–1.3)
EOSINOPHIL # BLD AUTO: 0.04 THOUSAND/ΜL (ref 0–0.61)
EOSINOPHIL NFR BLD AUTO: 1 % (ref 0–6)
ERYTHROCYTE [DISTWIDTH] IN BLOOD BY AUTOMATED COUNT: 14.6 % (ref 11.6–15.1)
GFR SERPL CREATININE-BSD FRML MDRD: 86 ML/MIN/1.73SQ M
GLUCOSE SERPL-MCNC: 91 MG/DL (ref 65–140)
HCT VFR BLD AUTO: 30 % (ref 36.5–49.3)
HGB BLD-MCNC: 9.4 G/DL (ref 12–17)
LYMPHOCYTES # BLD AUTO: 0.77 THOUSANDS/ΜL (ref 0.6–4.47)
LYMPHOCYTES NFR BLD AUTO: 12 % (ref 14–44)
MCH RBC QN AUTO: 29 PG (ref 26.8–34.3)
MCHC RBC AUTO-ENTMCNC: 31.3 G/DL (ref 31.4–37.4)
MCV RBC AUTO: 93 FL (ref 82–98)
MONOCYTES # BLD AUTO: 1 THOUSAND/ΜL (ref 0.17–1.22)
MONOCYTES NFR BLD AUTO: 15 % (ref 4–12)
NEUTROPHILS # BLD AUTO: 4.67 THOUSANDS/ΜL (ref 1.85–7.62)
NEUTS SEG NFR BLD AUTO: 72 % (ref 43–75)
NRBC BLD AUTO-RTO: 0 /100 WBCS
PLATELET # BLD AUTO: 114 THOUSANDS/UL (ref 149–390)
PMV BLD AUTO: 11.5 FL (ref 8.9–12.7)
POTASSIUM SERPL-SCNC: 3.6 MMOL/L (ref 3.5–5.3)
RBC # BLD AUTO: 3.24 MILLION/UL (ref 3.88–5.62)
SODIUM SERPL-SCNC: 143 MMOL/L (ref 136–145)
WBC # BLD AUTO: 6.53 THOUSAND/UL (ref 4.31–10.16)

## 2017-07-26 PROCEDURE — 85025 COMPLETE CBC W/AUTO DIFF WBC: CPT | Performed by: STUDENT IN AN ORGANIZED HEALTH CARE EDUCATION/TRAINING PROGRAM

## 2017-07-26 PROCEDURE — 94668 MNPJ CHEST WALL SBSQ: CPT

## 2017-07-26 PROCEDURE — 80048 BASIC METABOLIC PNL TOTAL CA: CPT | Performed by: STUDENT IN AN ORGANIZED HEALTH CARE EDUCATION/TRAINING PROGRAM

## 2017-07-26 PROCEDURE — C9113 INJ PANTOPRAZOLE SODIUM, VIA: HCPCS | Performed by: SURGERY

## 2017-07-26 PROCEDURE — 97116 GAIT TRAINING THERAPY: CPT

## 2017-07-26 PROCEDURE — 97530 THERAPEUTIC ACTIVITIES: CPT

## 2017-07-26 RX ORDER — OXYCODONE HYDROCHLORIDE AND ACETAMINOPHEN 5; 325 MG/1; MG/1
2 TABLET ORAL EVERY 4 HOURS PRN
Status: DISCONTINUED | OUTPATIENT
Start: 2017-07-26 | End: 2017-07-29 | Stop reason: HOSPADM

## 2017-07-26 RX ORDER — OXYCODONE HYDROCHLORIDE AND ACETAMINOPHEN 5; 325 MG/1; MG/1
1 TABLET ORAL EVERY 4 HOURS PRN
Status: DISCONTINUED | OUTPATIENT
Start: 2017-07-26 | End: 2017-07-29 | Stop reason: HOSPADM

## 2017-07-26 RX ORDER — HEPARIN SODIUM 5000 [USP'U]/ML
5000 INJECTION, SOLUTION INTRAVENOUS; SUBCUTANEOUS EVERY 8 HOURS SCHEDULED
Status: DISCONTINUED | OUTPATIENT
Start: 2017-07-26 | End: 2017-07-28

## 2017-07-26 RX ADMIN — Medication: at 07:29

## 2017-07-26 RX ADMIN — DEXTROSE, SODIUM CHLORIDE, AND POTASSIUM CHLORIDE 50 ML/HR: 5; .45; .15 INJECTION INTRAVENOUS at 06:19

## 2017-07-26 RX ADMIN — METOPROLOL TARTRATE 12.5 MG: 25 TABLET ORAL at 12:27

## 2017-07-26 RX ADMIN — PANTOPRAZOLE SODIUM 40 MG: 40 INJECTION, POWDER, FOR SOLUTION INTRAVENOUS at 09:39

## 2017-07-27 PROCEDURE — C9113 INJ PANTOPRAZOLE SODIUM, VIA: HCPCS | Performed by: SURGERY

## 2017-07-27 PROCEDURE — 92610 EVALUATE SWALLOWING FUNCTION: CPT

## 2017-07-27 PROCEDURE — 97116 GAIT TRAINING THERAPY: CPT

## 2017-07-27 PROCEDURE — 97530 THERAPEUTIC ACTIVITIES: CPT

## 2017-07-27 PROCEDURE — 97112 NEUROMUSCULAR REEDUCATION: CPT

## 2017-07-27 PROCEDURE — 94668 MNPJ CHEST WALL SBSQ: CPT

## 2017-07-27 PROCEDURE — 94760 N-INVAS EAR/PLS OXIMETRY 1: CPT

## 2017-07-27 RX ORDER — POTASSIUM CHLORIDE 14.9 MG/ML
20 INJECTION INTRAVENOUS ONCE
Status: COMPLETED | OUTPATIENT
Start: 2017-07-27 | End: 2017-07-27

## 2017-07-27 RX ADMIN — DEXTROSE, SODIUM CHLORIDE, AND POTASSIUM CHLORIDE 50 ML/HR: 5; .45; .15 INJECTION INTRAVENOUS at 01:17

## 2017-07-27 RX ADMIN — HEPARIN SODIUM 5000 UNITS: 5000 INJECTION, SOLUTION INTRAVENOUS; SUBCUTANEOUS at 14:46

## 2017-07-27 RX ADMIN — POTASSIUM CHLORIDE 20 MEQ: 200 INJECTION, SOLUTION INTRAVENOUS at 01:12

## 2017-07-27 RX ADMIN — PANTOPRAZOLE SODIUM 40 MG: 40 INJECTION, POWDER, FOR SOLUTION INTRAVENOUS at 08:48

## 2017-07-27 RX ADMIN — HEPARIN SODIUM 5000 UNITS: 5000 INJECTION, SOLUTION INTRAVENOUS; SUBCUTANEOUS at 21:12

## 2017-07-27 RX ADMIN — METOPROLOL TARTRATE 12.5 MG: 25 TABLET ORAL at 08:48

## 2017-07-27 RX ADMIN — DEXTROSE, SODIUM CHLORIDE, AND POTASSIUM CHLORIDE 50 ML/HR: 5; .45; .15 INJECTION INTRAVENOUS at 22:29

## 2017-07-27 RX ADMIN — METOPROLOL TARTRATE 12.5 MG: 25 TABLET ORAL at 21:12

## 2017-07-27 RX ADMIN — HEPARIN SODIUM 5000 UNITS: 5000 INJECTION, SOLUTION INTRAVENOUS; SUBCUTANEOUS at 05:31

## 2017-07-28 ENCOUNTER — APPOINTMENT (INPATIENT)
Dept: PHYSICAL THERAPY | Facility: HOSPITAL | Age: 82
DRG: 330 | End: 2017-07-28
Payer: MEDICARE

## 2017-07-28 LAB
ANION GAP SERPL CALCULATED.3IONS-SCNC: 7 MMOL/L (ref 4–13)
BASOPHILS # BLD AUTO: 0.01 THOUSANDS/ΜL (ref 0–0.1)
BASOPHILS NFR BLD AUTO: 0 % (ref 0–1)
BUN SERPL-MCNC: 14 MG/DL (ref 5–25)
CALCIUM SERPL-MCNC: 7.6 MG/DL (ref 8.3–10.1)
CHLORIDE SERPL-SCNC: 111 MMOL/L (ref 100–108)
CO2 SERPL-SCNC: 24 MMOL/L (ref 21–32)
CREAT SERPL-MCNC: 0.71 MG/DL (ref 0.6–1.3)
EOSINOPHIL # BLD AUTO: 0.27 THOUSAND/ΜL (ref 0–0.61)
EOSINOPHIL NFR BLD AUTO: 3 % (ref 0–6)
ERYTHROCYTE [DISTWIDTH] IN BLOOD BY AUTOMATED COUNT: 14.4 % (ref 11.6–15.1)
GFR SERPL CREATININE-BSD FRML MDRD: 87 ML/MIN/1.73SQ M
GLUCOSE SERPL-MCNC: 99 MG/DL (ref 65–140)
HCT VFR BLD AUTO: 34.6 % (ref 36.5–49.3)
HGB BLD-MCNC: 10.8 G/DL (ref 12–17)
LYMPHOCYTES # BLD AUTO: 0.6 THOUSANDS/ΜL (ref 0.6–4.47)
LYMPHOCYTES NFR BLD AUTO: 7 % (ref 14–44)
MCH RBC QN AUTO: 29 PG (ref 26.8–34.3)
MCHC RBC AUTO-ENTMCNC: 31.2 G/DL (ref 31.4–37.4)
MCV RBC AUTO: 93 FL (ref 82–98)
MONOCYTES # BLD AUTO: 0.64 THOUSAND/ΜL (ref 0.17–1.22)
MONOCYTES NFR BLD AUTO: 7 % (ref 4–12)
NEUTROPHILS # BLD AUTO: 7.1 THOUSANDS/ΜL (ref 1.85–7.62)
NEUTS SEG NFR BLD AUTO: 83 % (ref 43–75)
NRBC BLD AUTO-RTO: 0 /100 WBCS
PLATELET # BLD AUTO: 145 THOUSANDS/UL (ref 149–390)
PMV BLD AUTO: 11.9 FL (ref 8.9–12.7)
POTASSIUM SERPL-SCNC: 3.1 MMOL/L (ref 3.5–5.3)
RBC # BLD AUTO: 3.73 MILLION/UL (ref 3.88–5.62)
SODIUM SERPL-SCNC: 142 MMOL/L (ref 136–145)
WBC # BLD AUTO: 8.68 THOUSAND/UL (ref 4.31–10.16)

## 2017-07-28 PROCEDURE — 80048 BASIC METABOLIC PNL TOTAL CA: CPT | Performed by: STUDENT IN AN ORGANIZED HEALTH CARE EDUCATION/TRAINING PROGRAM

## 2017-07-28 PROCEDURE — 97116 GAIT TRAINING THERAPY: CPT

## 2017-07-28 PROCEDURE — 94760 N-INVAS EAR/PLS OXIMETRY 1: CPT

## 2017-07-28 PROCEDURE — 94668 MNPJ CHEST WALL SBSQ: CPT

## 2017-07-28 PROCEDURE — 85025 COMPLETE CBC W/AUTO DIFF WBC: CPT | Performed by: STUDENT IN AN ORGANIZED HEALTH CARE EDUCATION/TRAINING PROGRAM

## 2017-07-28 PROCEDURE — 97112 NEUROMUSCULAR REEDUCATION: CPT

## 2017-07-28 PROCEDURE — C9113 INJ PANTOPRAZOLE SODIUM, VIA: HCPCS | Performed by: SURGERY

## 2017-07-28 RX ORDER — POTASSIUM CHLORIDE 14.9 MG/ML
20 INJECTION INTRAVENOUS ONCE
Status: COMPLETED | OUTPATIENT
Start: 2017-07-28 | End: 2017-07-28

## 2017-07-28 RX ORDER — HEPARIN SODIUM 5000 [USP'U]/ML
5000 INJECTION, SOLUTION INTRAVENOUS; SUBCUTANEOUS EVERY 12 HOURS SCHEDULED
Status: DISCONTINUED | OUTPATIENT
Start: 2017-07-28 | End: 2017-07-29 | Stop reason: HOSPADM

## 2017-07-28 RX ORDER — POTASSIUM CHLORIDE 20MEQ/15ML
40 LIQUID (ML) ORAL ONCE
Status: COMPLETED | OUTPATIENT
Start: 2017-07-28 | End: 2017-07-28

## 2017-07-28 RX ADMIN — METOPROLOL TARTRATE 12.5 MG: 25 TABLET ORAL at 23:04

## 2017-07-28 RX ADMIN — METOPROLOL TARTRATE 12.5 MG: 25 TABLET ORAL at 08:23

## 2017-07-28 RX ADMIN — HEPARIN SODIUM 5000 UNITS: 5000 INJECTION, SOLUTION INTRAVENOUS; SUBCUTANEOUS at 05:00

## 2017-07-28 RX ADMIN — HEPARIN SODIUM 5000 UNITS: 5000 INJECTION, SOLUTION INTRAVENOUS; SUBCUTANEOUS at 23:06

## 2017-07-28 RX ADMIN — ONDANSETRON 4 MG: 2 INJECTION INTRAMUSCULAR; INTRAVENOUS at 21:33

## 2017-07-28 RX ADMIN — PANTOPRAZOLE SODIUM 40 MG: 40 INJECTION, POWDER, FOR SOLUTION INTRAVENOUS at 08:23

## 2017-07-28 RX ADMIN — POTASSIUM CHLORIDE 40 MEQ: 20 SOLUTION ORAL at 08:23

## 2017-07-28 RX ADMIN — POTASSIUM CHLORIDE 20 MEQ: 200 INJECTION, SOLUTION INTRAVENOUS at 08:23

## 2017-07-28 RX ADMIN — POTASSIUM CHLORIDE 20 MEQ: 200 INJECTION, SOLUTION INTRAVENOUS at 10:20

## 2017-07-29 VITALS
SYSTOLIC BLOOD PRESSURE: 113 MMHG | BODY MASS INDEX: 24.39 KG/M2 | OXYGEN SATURATION: 97 % | RESPIRATION RATE: 18 BRPM | HEIGHT: 67 IN | HEART RATE: 93 BPM | DIASTOLIC BLOOD PRESSURE: 62 MMHG | WEIGHT: 155.42 LBS | TEMPERATURE: 98.8 F

## 2017-07-29 PROCEDURE — C9113 INJ PANTOPRAZOLE SODIUM, VIA: HCPCS | Performed by: SURGERY

## 2017-07-29 PROCEDURE — 94668 MNPJ CHEST WALL SBSQ: CPT

## 2017-07-29 RX ORDER — POTASSIUM CHLORIDE 20 MEQ/1
40 TABLET, EXTENDED RELEASE ORAL ONCE
Status: COMPLETED | OUTPATIENT
Start: 2017-07-29 | End: 2017-07-29

## 2017-07-29 RX ORDER — OXYCODONE HYDROCHLORIDE 5 MG/1
5 TABLET ORAL EVERY 4 HOURS PRN
Qty: 25 TABLET | Refills: 0 | Status: SHIPPED | OUTPATIENT
Start: 2017-07-29 | End: 2017-08-08

## 2017-07-29 RX ORDER — PANTOPRAZOLE SODIUM 40 MG/1
40 TABLET, DELAYED RELEASE ORAL
Status: DISCONTINUED | OUTPATIENT
Start: 2017-07-30 | End: 2017-07-29 | Stop reason: HOSPADM

## 2017-07-29 RX ADMIN — METOPROLOL TARTRATE 12.5 MG: 25 TABLET ORAL at 09:24

## 2017-07-29 RX ADMIN — OXYCODONE HYDROCHLORIDE AND ACETAMINOPHEN 1 TABLET: 5; 325 TABLET ORAL at 09:25

## 2017-07-29 RX ADMIN — HEPARIN SODIUM 5000 UNITS: 5000 INJECTION, SOLUTION INTRAVENOUS; SUBCUTANEOUS at 09:24

## 2017-07-29 RX ADMIN — PANTOPRAZOLE SODIUM 40 MG: 40 INJECTION, POWDER, FOR SOLUTION INTRAVENOUS at 09:24

## 2017-07-29 RX ADMIN — POTASSIUM CHLORIDE 40 MEQ: 1500 TABLET, EXTENDED RELEASE ORAL at 11:16

## 2017-07-29 RX ADMIN — OXYCODONE HYDROCHLORIDE AND ACETAMINOPHEN 1 TABLET: 5; 325 TABLET ORAL at 12:52

## 2017-09-01 ENCOUNTER — APPOINTMENT (EMERGENCY)
Dept: CT IMAGING | Facility: HOSPITAL | Age: 82
DRG: 863 | End: 2017-09-01
Payer: MEDICARE

## 2017-09-01 ENCOUNTER — APPOINTMENT (INPATIENT)
Dept: RADIOLOGY | Facility: HOSPITAL | Age: 82
DRG: 863 | End: 2017-09-01
Payer: MEDICARE

## 2017-09-01 ENCOUNTER — HOSPITAL ENCOUNTER (INPATIENT)
Facility: HOSPITAL | Age: 82
LOS: 2 days | Discharge: HOME HEALTH | DRG: 863 | End: 2017-09-03
Attending: EMERGENCY MEDICINE | Admitting: SURGERY
Payer: MEDICARE

## 2017-09-01 DIAGNOSIS — L02.211 ABSCESS OF ABDOMINAL WALL: Primary | ICD-10-CM

## 2017-09-01 DIAGNOSIS — IMO0001 POSTOPERATIVE WOUND INFECTION, INITIAL ENCOUNTER: ICD-10-CM

## 2017-09-01 LAB
ALBUMIN SERPL BCP-MCNC: 2.6 G/DL (ref 3.5–5)
ALP SERPL-CCNC: 69 U/L (ref 46–116)
ALT SERPL W P-5'-P-CCNC: 18 U/L (ref 12–78)
ANION GAP SERPL CALCULATED.3IONS-SCNC: 8 MMOL/L (ref 4–13)
APTT PPP: 33 SECONDS (ref 23–35)
AST SERPL W P-5'-P-CCNC: 15 U/L (ref 5–45)
BASOPHILS # BLD AUTO: 0.02 THOUSANDS/ΜL (ref 0–0.1)
BASOPHILS NFR BLD AUTO: 0 % (ref 0–1)
BILIRUB DIRECT SERPL-MCNC: 0.1 MG/DL (ref 0–0.2)
BILIRUB SERPL-MCNC: 0.5 MG/DL (ref 0.2–1)
BUN SERPL-MCNC: 11 MG/DL (ref 5–25)
CALCIUM SERPL-MCNC: 8.7 MG/DL (ref 8.3–10.1)
CHLORIDE SERPL-SCNC: 100 MMOL/L (ref 100–108)
CO2 SERPL-SCNC: 25 MMOL/L (ref 21–32)
CREAT SERPL-MCNC: 0.98 MG/DL (ref 0.6–1.3)
EOSINOPHIL # BLD AUTO: 0.02 THOUSAND/ΜL (ref 0–0.61)
EOSINOPHIL NFR BLD AUTO: 0 % (ref 0–6)
ERYTHROCYTE [DISTWIDTH] IN BLOOD BY AUTOMATED COUNT: 15.4 % (ref 11.6–15.1)
GFR SERPL CREATININE-BSD FRML MDRD: 71 ML/MIN/1.73SQ M
GLUCOSE SERPL-MCNC: 102 MG/DL (ref 65–140)
HCT VFR BLD AUTO: 36.4 % (ref 36.5–49.3)
HGB BLD-MCNC: 11 G/DL (ref 12–17)
INR PPP: 1.01 (ref 0.86–1.16)
LACTATE SERPL-SCNC: 0.8 MMOL/L (ref 0.5–2)
LYMPHOCYTES # BLD AUTO: 0.71 THOUSANDS/ΜL (ref 0.6–4.47)
LYMPHOCYTES NFR BLD AUTO: 8 % (ref 14–44)
MCH RBC QN AUTO: 27.1 PG (ref 26.8–34.3)
MCHC RBC AUTO-ENTMCNC: 30.2 G/DL (ref 31.4–37.4)
MCV RBC AUTO: 90 FL (ref 82–98)
MONOCYTES # BLD AUTO: 0.59 THOUSAND/ΜL (ref 0.17–1.22)
MONOCYTES NFR BLD AUTO: 6 % (ref 4–12)
NEUTROPHILS # BLD AUTO: 7.88 THOUSANDS/ΜL (ref 1.85–7.62)
NEUTS SEG NFR BLD AUTO: 86 % (ref 43–75)
PLATELET # BLD AUTO: 150 THOUSANDS/UL (ref 149–390)
PMV BLD AUTO: 10.4 FL (ref 8.9–12.7)
POTASSIUM SERPL-SCNC: 3.6 MMOL/L (ref 3.5–5.3)
PROT SERPL-MCNC: 7.2 G/DL (ref 6.4–8.2)
PROTHROMBIN TIME: 13.6 SECONDS (ref 12.1–14.4)
RBC # BLD AUTO: 4.06 MILLION/UL (ref 3.88–5.62)
SODIUM SERPL-SCNC: 133 MMOL/L (ref 136–145)
TROPONIN I SERPL-MCNC: <0.02 NG/ML
WBC # BLD AUTO: 9.22 THOUSAND/UL (ref 4.31–10.16)

## 2017-09-01 PROCEDURE — 85025 COMPLETE CBC W/AUTO DIFF WBC: CPT | Performed by: EMERGENCY MEDICINE

## 2017-09-01 PROCEDURE — 83605 ASSAY OF LACTIC ACID: CPT | Performed by: EMERGENCY MEDICINE

## 2017-09-01 PROCEDURE — 71020 HB CHEST X-RAY 2VW FRONTAL&LATL: CPT

## 2017-09-01 PROCEDURE — 36415 COLL VENOUS BLD VENIPUNCTURE: CPT | Performed by: EMERGENCY MEDICINE

## 2017-09-01 PROCEDURE — 80076 HEPATIC FUNCTION PANEL: CPT | Performed by: EMERGENCY MEDICINE

## 2017-09-01 PROCEDURE — 74177 CT ABD & PELVIS W/CONTRAST: CPT

## 2017-09-01 PROCEDURE — 93005 ELECTROCARDIOGRAM TRACING: CPT | Performed by: EMERGENCY MEDICINE

## 2017-09-01 PROCEDURE — 87040 BLOOD CULTURE FOR BACTERIA: CPT | Performed by: EMERGENCY MEDICINE

## 2017-09-01 PROCEDURE — 99285 EMERGENCY DEPT VISIT HI MDM: CPT

## 2017-09-01 PROCEDURE — 85730 THROMBOPLASTIN TIME PARTIAL: CPT | Performed by: EMERGENCY MEDICINE

## 2017-09-01 PROCEDURE — 96361 HYDRATE IV INFUSION ADD-ON: CPT

## 2017-09-01 PROCEDURE — 85610 PROTHROMBIN TIME: CPT | Performed by: EMERGENCY MEDICINE

## 2017-09-01 PROCEDURE — 96365 THER/PROPH/DIAG IV INF INIT: CPT

## 2017-09-01 PROCEDURE — 84484 ASSAY OF TROPONIN QUANT: CPT | Performed by: EMERGENCY MEDICINE

## 2017-09-01 PROCEDURE — 80048 BASIC METABOLIC PNL TOTAL CA: CPT | Performed by: EMERGENCY MEDICINE

## 2017-09-01 RX ORDER — MORPHINE SULFATE 2 MG/ML
2 INJECTION, SOLUTION INTRAMUSCULAR; INTRAVENOUS EVERY 4 HOURS PRN
Status: DISCONTINUED | OUTPATIENT
Start: 2017-09-01 | End: 2017-09-03 | Stop reason: HOSPADM

## 2017-09-01 RX ORDER — VANCOMYCIN HYDROCHLORIDE 1 G/200ML
15 INJECTION, SOLUTION INTRAVENOUS ONCE
Status: DISCONTINUED | OUTPATIENT
Start: 2017-09-01 | End: 2017-09-01

## 2017-09-01 RX ORDER — SODIUM CHLORIDE, SODIUM LACTATE, POTASSIUM CHLORIDE, CALCIUM CHLORIDE 600; 310; 30; 20 MG/100ML; MG/100ML; MG/100ML; MG/100ML
100 INJECTION, SOLUTION INTRAVENOUS CONTINUOUS
Status: DISCONTINUED | OUTPATIENT
Start: 2017-09-01 | End: 2017-09-02

## 2017-09-01 RX ORDER — ACETAMINOPHEN 325 MG/1
650 TABLET ORAL EVERY 6 HOURS PRN
Status: DISCONTINUED | OUTPATIENT
Start: 2017-09-01 | End: 2017-09-03 | Stop reason: HOSPADM

## 2017-09-01 RX ORDER — HEPARIN SODIUM 5000 [USP'U]/ML
5000 INJECTION, SOLUTION INTRAVENOUS; SUBCUTANEOUS EVERY 8 HOURS SCHEDULED
Status: DISCONTINUED | OUTPATIENT
Start: 2017-09-01 | End: 2017-09-02

## 2017-09-01 RX ORDER — HYDROCODONE BITARTRATE AND ACETAMINOPHEN 5; 325 MG/1; MG/1
1 TABLET ORAL EVERY 6 HOURS PRN
Status: DISCONTINUED | OUTPATIENT
Start: 2017-09-01 | End: 2017-09-03 | Stop reason: HOSPADM

## 2017-09-01 RX ADMIN — SODIUM CHLORIDE 1000 ML: 0.9 INJECTION, SOLUTION INTRAVENOUS at 19:07

## 2017-09-01 RX ADMIN — HEPARIN SODIUM 5000 UNITS: 5000 INJECTION, SOLUTION INTRAVENOUS; SUBCUTANEOUS at 21:54

## 2017-09-01 RX ADMIN — SODIUM CHLORIDE, SODIUM LACTATE, POTASSIUM CHLORIDE, AND CALCIUM CHLORIDE 100 ML/HR: .6; .31; .03; .02 INJECTION, SOLUTION INTRAVENOUS at 23:06

## 2017-09-01 RX ADMIN — SODIUM CHLORIDE 3 G: 9 INJECTION, SOLUTION INTRAVENOUS at 21:53

## 2017-09-01 RX ADMIN — VANCOMYCIN HYDROCHLORIDE 1000 MG: 1 INJECTION, SOLUTION INTRAVENOUS at 20:42

## 2017-09-01 RX ADMIN — CEFEPIME 2000 MG: 2 INJECTION, POWDER, FOR SOLUTION INTRAMUSCULAR; INTRAVENOUS at 19:48

## 2017-09-01 RX ADMIN — IOHEXOL 100 ML: 350 INJECTION, SOLUTION INTRAVENOUS at 19:39

## 2017-09-02 LAB
ANION GAP SERPL CALCULATED.3IONS-SCNC: 10 MMOL/L (ref 4–13)
ATRIAL RATE: 92 BPM
BASOPHILS # BLD AUTO: 0.02 THOUSANDS/ΜL (ref 0–0.1)
BASOPHILS NFR BLD AUTO: 0 % (ref 0–1)
BUN SERPL-MCNC: 9 MG/DL (ref 5–25)
CALCIUM SERPL-MCNC: 8 MG/DL (ref 8.3–10.1)
CHLORIDE SERPL-SCNC: 106 MMOL/L (ref 100–108)
CO2 SERPL-SCNC: 22 MMOL/L (ref 21–32)
CREAT SERPL-MCNC: 0.83 MG/DL (ref 0.6–1.3)
EOSINOPHIL # BLD AUTO: 0.02 THOUSAND/ΜL (ref 0–0.61)
EOSINOPHIL NFR BLD AUTO: 0 % (ref 0–6)
ERYTHROCYTE [DISTWIDTH] IN BLOOD BY AUTOMATED COUNT: 15.1 % (ref 11.6–15.1)
GFR SERPL CREATININE-BSD FRML MDRD: 81 ML/MIN/1.73SQ M
GLUCOSE SERPL-MCNC: 89 MG/DL (ref 65–140)
HCT VFR BLD AUTO: 29.4 % (ref 36.5–49.3)
HGB BLD-MCNC: 8.8 G/DL (ref 12–17)
LYMPHOCYTES # BLD AUTO: 0.83 THOUSANDS/ΜL (ref 0.6–4.47)
LYMPHOCYTES NFR BLD AUTO: 12 % (ref 14–44)
MCH RBC QN AUTO: 27.1 PG (ref 26.8–34.3)
MCHC RBC AUTO-ENTMCNC: 29.9 G/DL (ref 31.4–37.4)
MCV RBC AUTO: 91 FL (ref 82–98)
MONOCYTES # BLD AUTO: 0.7 THOUSAND/ΜL (ref 0.17–1.22)
MONOCYTES NFR BLD AUTO: 10 % (ref 4–12)
NEUTROPHILS # BLD AUTO: 5.14 THOUSANDS/ΜL (ref 1.85–7.62)
NEUTS SEG NFR BLD AUTO: 78 % (ref 43–75)
P AXIS: 56 DEGREES
PLATELET # BLD AUTO: 129 THOUSANDS/UL (ref 149–390)
PMV BLD AUTO: 10.7 FL (ref 8.9–12.7)
POTASSIUM SERPL-SCNC: 3.6 MMOL/L (ref 3.5–5.3)
PR INTERVAL: 136 MS
QRS AXIS: -10 DEGREES
QRSD INTERVAL: 98 MS
QT INTERVAL: 338 MS
QTC INTERVAL: 409 MS
RBC # BLD AUTO: 3.25 MILLION/UL (ref 3.88–5.62)
SODIUM SERPL-SCNC: 138 MMOL/L (ref 136–145)
T WAVE AXIS: -46 DEGREES
VENTRICULAR RATE: 88 BPM
WBC # BLD AUTO: 6.71 THOUSAND/UL (ref 4.31–10.16)

## 2017-09-02 PROCEDURE — 80048 BASIC METABOLIC PNL TOTAL CA: CPT | Performed by: PHYSICIAN ASSISTANT

## 2017-09-02 PROCEDURE — 85025 COMPLETE CBC W/AUTO DIFF WBC: CPT | Performed by: PHYSICIAN ASSISTANT

## 2017-09-02 RX ORDER — DEXTROSE, SODIUM CHLORIDE, AND POTASSIUM CHLORIDE 5; .45; .15 G/100ML; G/100ML; G/100ML
50 INJECTION INTRAVENOUS CONTINUOUS
Status: DISCONTINUED | OUTPATIENT
Start: 2017-09-02 | End: 2017-09-03 | Stop reason: HOSPADM

## 2017-09-02 RX ADMIN — SODIUM CHLORIDE 3 G: 9 INJECTION, SOLUTION INTRAVENOUS at 17:22

## 2017-09-02 RX ADMIN — DEXTROSE, SODIUM CHLORIDE, AND POTASSIUM CHLORIDE 50 ML/HR: 5; .45; .15 INJECTION INTRAVENOUS at 11:31

## 2017-09-02 RX ADMIN — ENOXAPARIN SODIUM 30 MG: 30 INJECTION SUBCUTANEOUS at 14:33

## 2017-09-02 RX ADMIN — SODIUM CHLORIDE 3 G: 9 INJECTION, SOLUTION INTRAVENOUS at 22:49

## 2017-09-02 RX ADMIN — HEPARIN SODIUM 5000 UNITS: 5000 INJECTION, SOLUTION INTRAVENOUS; SUBCUTANEOUS at 05:15

## 2017-09-02 RX ADMIN — SODIUM CHLORIDE 3 G: 9 INJECTION, SOLUTION INTRAVENOUS at 04:19

## 2017-09-02 RX ADMIN — SODIUM CHLORIDE 3 G: 9 INJECTION, SOLUTION INTRAVENOUS at 11:00

## 2017-09-02 RX ADMIN — SODIUM CHLORIDE, SODIUM LACTATE, POTASSIUM CHLORIDE, AND CALCIUM CHLORIDE 100 ML/HR: .6; .31; .03; .02 INJECTION, SOLUTION INTRAVENOUS at 09:09

## 2017-09-03 VITALS
RESPIRATION RATE: 18 BRPM | OXYGEN SATURATION: 94 % | HEIGHT: 70 IN | BODY MASS INDEX: 19.57 KG/M2 | TEMPERATURE: 98.4 F | WEIGHT: 136.69 LBS | SYSTOLIC BLOOD PRESSURE: 124 MMHG | HEART RATE: 76 BPM | DIASTOLIC BLOOD PRESSURE: 66 MMHG

## 2017-09-03 PROBLEM — IMO0002 ABDOMINAL ABSCESS: Status: ACTIVE | Noted: 2017-09-03

## 2017-09-03 RX ADMIN — SODIUM CHLORIDE 3 G: 9 INJECTION, SOLUTION INTRAVENOUS at 09:56

## 2017-09-03 RX ADMIN — ENOXAPARIN SODIUM 30 MG: 30 INJECTION SUBCUTANEOUS at 08:46

## 2017-09-03 RX ADMIN — SODIUM CHLORIDE 3 G: 9 INJECTION, SOLUTION INTRAVENOUS at 04:34

## 2017-09-03 RX ADMIN — DEXTROSE, SODIUM CHLORIDE, AND POTASSIUM CHLORIDE 50 ML/HR: 5; .45; .15 INJECTION INTRAVENOUS at 11:01

## 2017-09-06 LAB
BACTERIA BLD CULT: NORMAL
BACTERIA BLD CULT: NORMAL

## 2017-10-12 ENCOUNTER — GENERIC CONVERSION - ENCOUNTER (OUTPATIENT)
Dept: OTHER | Facility: OTHER | Age: 82
End: 2017-10-12

## 2017-12-29 ENCOUNTER — APPOINTMENT (EMERGENCY)
Dept: CT IMAGING | Facility: HOSPITAL | Age: 82
End: 2017-12-29
Payer: MEDICARE

## 2017-12-29 ENCOUNTER — APPOINTMENT (EMERGENCY)
Dept: RADIOLOGY | Facility: HOSPITAL | Age: 82
End: 2017-12-29
Payer: MEDICARE

## 2017-12-29 ENCOUNTER — HOSPITAL ENCOUNTER (EMERGENCY)
Facility: HOSPITAL | Age: 82
Discharge: NURSING, REHAB OR LONG-TERM CARE | End: 2017-12-29
Attending: EMERGENCY MEDICINE | Admitting: EMERGENCY MEDICINE
Payer: MEDICARE

## 2017-12-29 VITALS
SYSTOLIC BLOOD PRESSURE: 110 MMHG | HEART RATE: 77 BPM | BODY MASS INDEX: 20.09 KG/M2 | TEMPERATURE: 98.6 F | WEIGHT: 139.99 LBS | OXYGEN SATURATION: 96 % | DIASTOLIC BLOOD PRESSURE: 65 MMHG | RESPIRATION RATE: 18 BRPM

## 2017-12-29 DIAGNOSIS — S09.90XA INJURY OF HEAD, INITIAL ENCOUNTER: ICD-10-CM
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
## 2017-12-29 LAB
ALBUMIN SERPL BCP-MCNC: 2.9 G/DL (ref 3.5–5)
ALP SERPL-CCNC: 57 U/L (ref 46–116)
ALT SERPL W P-5'-P-CCNC: 16 U/L (ref 12–78)
ANION GAP SERPL CALCULATED.3IONS-SCNC: 10 MMOL/L (ref 4–13)
AST SERPL W P-5'-P-CCNC: 15 U/L (ref 5–45)
BASOPHILS # BLD AUTO: 0.01 THOUSANDS/ΜL (ref 0–0.1)
BASOPHILS NFR BLD AUTO: 0 % (ref 0–1)
BILIRUB SERPL-MCNC: 0.6 MG/DL (ref 0.2–1)
BUN SERPL-MCNC: 15 MG/DL (ref 5–25)
CALCIUM SERPL-MCNC: 8.7 MG/DL (ref 8.3–10.1)
CHLORIDE SERPL-SCNC: 102 MMOL/L (ref 100–108)
CO2 SERPL-SCNC: 26 MMOL/L (ref 21–32)
CREAT SERPL-MCNC: 1.02 MG/DL (ref 0.6–1.3)
EOSINOPHIL # BLD AUTO: 0.01 THOUSAND/ΜL (ref 0–0.61)
EOSINOPHIL NFR BLD AUTO: 0 % (ref 0–6)
ERYTHROCYTE [DISTWIDTH] IN BLOOD BY AUTOMATED COUNT: 16.3 % (ref 11.6–15.1)
GFR SERPL CREATININE-BSD FRML MDRD: 68 ML/MIN/1.73SQ M
GLUCOSE SERPL-MCNC: 104 MG/DL (ref 65–140)
HCT VFR BLD AUTO: 33.9 % (ref 36.5–49.3)
HGB BLD-MCNC: 10.2 G/DL (ref 12–17)
LYMPHOCYTES # BLD AUTO: 0.59 THOUSANDS/ΜL (ref 0.6–4.47)
LYMPHOCYTES NFR BLD AUTO: 5 % (ref 14–44)
MCH RBC QN AUTO: 27.7 PG (ref 26.8–34.3)
MCHC RBC AUTO-ENTMCNC: 30.1 G/DL (ref 31.4–37.4)
MCV RBC AUTO: 92 FL (ref 82–98)
MONOCYTES # BLD AUTO: 0.71 THOUSAND/ΜL (ref 0.17–1.22)
MONOCYTES NFR BLD AUTO: 6 % (ref 4–12)
NEUTROPHILS # BLD AUTO: 9.8 THOUSANDS/ΜL (ref 1.85–7.62)
NEUTS SEG NFR BLD AUTO: 89 % (ref 43–75)
PLATELET # BLD AUTO: 148 THOUSANDS/UL (ref 149–390)
PMV BLD AUTO: 10.4 FL (ref 8.9–12.7)
POTASSIUM SERPL-SCNC: 3.4 MMOL/L (ref 3.5–5.3)
PROT SERPL-MCNC: 7 G/DL (ref 6.4–8.2)
RBC # BLD AUTO: 3.68 MILLION/UL (ref 3.88–5.62)
SODIUM SERPL-SCNC: 138 MMOL/L (ref 136–145)
WBC # BLD AUTO: 11.12 THOUSAND/UL (ref 4.31–10.16)

## 2017-12-29 PROCEDURE — 85025 COMPLETE CBC W/AUTO DIFF WBC: CPT | Performed by: PHYSICIAN ASSISTANT

## 2017-12-29 PROCEDURE — 96360 HYDRATION IV INFUSION INIT: CPT

## 2017-12-29 PROCEDURE — 36415 COLL VENOUS BLD VENIPUNCTURE: CPT | Performed by: PHYSICIAN ASSISTANT

## 2017-12-29 PROCEDURE — 80053 COMPREHEN METABOLIC PANEL: CPT | Performed by: PHYSICIAN ASSISTANT

## 2017-12-29 PROCEDURE — 96361 HYDRATE IV INFUSION ADD-ON: CPT

## 2017-12-29 PROCEDURE — 71020 HB X-RAY EXAM CHEST 2 VIEWS: CPT

## 2017-12-29 PROCEDURE — 99285 EMERGENCY DEPT VISIT HI MDM: CPT

## 2017-12-29 PROCEDURE — 70450 CT HEAD/BRAIN W/O DYE: CPT

## 2017-12-29 RX ORDER — FERROUS SULFATE 325(65) MG
325 TABLET ORAL
COMMUNITY

## 2017-12-29 RX ORDER — LEVOTHYROXINE SODIUM 0.03 MG/1
25 TABLET ORAL DAILY
COMMUNITY

## 2017-12-29 RX ORDER — LANOLIN ALCOHOL/MO/W.PET/CERES
CREAM (GRAM) TOPICAL DAILY
COMMUNITY

## 2017-12-29 RX ORDER — ACETAMINOPHEN 325 MG/1
975 TABLET ORAL ONCE
Status: COMPLETED | OUTPATIENT
Start: 2017-12-29 | End: 2017-12-29

## 2017-12-29 RX ORDER — ACETAMINOPHEN 325 MG/1
650 TABLET ORAL EVERY 6 HOURS PRN
COMMUNITY

## 2017-12-29 RX ORDER — ALPRAZOLAM 0.25 MG/1
0.25 TABLET ORAL 2 TIMES DAILY PRN
COMMUNITY

## 2017-12-29 RX ADMIN — SODIUM CHLORIDE 1000 ML: 0.9 INJECTION, SOLUTION INTRAVENOUS at 18:00

## 2017-12-29 RX ADMIN — ACETAMINOPHEN 975 MG: 325 TABLET, FILM COATED ORAL at 18:13

## 2017-12-30 NOTE — ED NOTES
[facility]  EMS to transport patient approx   23:00-sooner if they can     Art Frisk  12/29/17 2030

## 2017-12-30 NOTE — DISCHARGE INSTRUCTIONS

## 2018-01-01 NOTE — ED PROVIDER NOTES
History  Chief Complaint   Patient presents with    Fall     via EMS from [facility], [place], after falling this afternoon  slight soreness in neck area     77-year-old male presents to the emergency department after a fall  Staff at a [facility] states that he  Marvene Joelle earlier today and has been complaining of  A headache  His wife states that he has been weaker than normal over the past several days noted and that he was going to have some blood work and urinalysis done  No recent fevers  History provided by:  Patient   used: No    Fall   Associated symptoms: headaches    Associated symptoms: no abdominal pain, no chest pain, no nausea, no seizures and no vomiting        Prior to Admission Medications   Prescriptions Last Dose Informant Patient Reported? Taking?    ALPRAZolam (XANAX) 0 25 mg tablet   Yes Yes   Sig: Take 0 25 mg by mouth 2 (two) times a day as needed for anxiety   Multiple Vitamin (MULTIVITAMIN) tablet   Yes Yes   Sig: Take 1 tablet by mouth daily   acetaminophen (TYLENOL) 325 mg tablet   Yes Yes   Sig: Take 650 mg by mouth every 6 (six) hours as needed for mild pain   cholecalciferol (VITAMIN D3) 1,000 units tablet   Yes Yes   Sig: Take 1,000 Units by mouth daily   cyanocobalamin (VITAMIN B-12) 1,000 mcg tablet   Yes Yes   Sig: Take by mouth daily   ferrous sulfate 325 (65 Fe) mg tablet   Yes Yes   Sig: Take 325 mg by mouth daily with breakfast   levothyroxine 25 mcg tablet   Yes Yes   Sig: Take 25 mcg by mouth daily      Facility-Administered Medications: None       Past Medical History:   Diagnosis Date    [facility] [address] [facility]     Bursitis     shoulders and back    [facility]     bladder, prostate    Coronary artery disease     Hard of hearing     Hyperlipidemia     Hypertension     Memory loss     Myocardial infarction 1987    Prostate CA ([facility] [address] [facility]     Vascular dementia        Past Surgical History:   Procedure Laterality Date    CATARACT EXTRACTION      COLONOSCOPY      COLOSTOMY      CYSTECTOMY W/ URETEROILEAL CONDUIT      SC LAP,SURG,COLECTOMY,W/REMVL TERM ILEUM Right 7/21/2017    Procedure: LAPAROSCOPIC ASSISTED COLON RESECITON, RIGHT COLON RESECTION, REPAIR INCISIONAL HERNIA;  Surgeon: Herlinda Patino MD;  [facility];  Service: General    [address] [place] [address] N/A 7/21/2017    Procedure: OPEN REPAIR PARA-STOMAL HERNIA;  Surgeon: Herlinda Patino MD;  Location[facility];  Service: General       History reviewed  No pertinent family history  I have reviewed and agree with the history as documented  Social History   Substance Use Topics    Smoking status: Former Smoker     Quit date: 1987    Smokeless tobacco: Former User    Alcohol use No        Review of Systems   Constitutional: Negative for activity change, appetite change, chills and fever  HENT: Negative for congestion, dental problem, drooling, ear discharge, ear pain, mouth sores, nosebleeds, rhinorrhea, sore throat and trouble swallowing  Eyes: Negative for pain, discharge and itching  Respiratory: Negative for cough, chest tightness, shortness of breath and wheezing  Cardiovascular: Negative for chest pain and palpitations  Gastrointestinal: Negative for abdominal pain, blood in stool, constipation, diarrhea, nausea and vomiting  Endocrine: Negative for cold intolerance and heat intolerance  Genitourinary: Negative for difficulty urinating, dysuria, flank pain, frequency and urgency  Skin: Negative for rash and wound  Allergic/Immunologic: Negative for food allergies and immunocompromised state  Neurological: Positive for weakness and headaches  Negative for dizziness, seizures, syncope and numbness  Psychiatric/Behavioral: Negative for agitation, behavioral problems and confusion         Physical Exam  ED Triage Vitals [12/29/17 1727]   Temperature Pulse Respirations Blood Pressure SpO2   98 6 °F (37 °C) 86 18 115/60 95 %      Temp Source Heart Rate Source Patient Position - Orthostatic VS BP Location FiO2 (%)   Oral Monitor Lying Right arm --      Pain Score       No Pain           Orthostatic Vital Signs  Vitals:    12/29/17 1727 12/29/17 2227   BP: 115/60 110/65   Pulse: 86 77   Patient Position - Orthostatic VS: Lying Lying       Physical Exam   Constitutional: He is oriented to person, place, and time  He appears well-developed and well-nourished  No distress  HENT:   Head: Normocephalic and atraumatic  Right Ear: External ear normal    Left Ear: External ear normal    Mouth/Throat: Oropharynx is clear and moist  No oropharyngeal exudate  Eyes: Conjunctivae and EOM are normal  Pupils are equal, round, and reactive to light  Neck: No JVD present  No tracheal deviation present  Cardiovascular: Normal rate, regular rhythm and normal heart sounds  Exam reveals no gallop and no friction rub  No murmur heard  Pulmonary/Chest: Effort normal and breath sounds normal  No respiratory distress  He has no wheezes  He has no rales  He exhibits no tenderness  Abdominal: Soft  Bowel sounds are normal  He exhibits no distension  There is no tenderness  There is no guarding  Musculoskeletal: Normal range of motion  He exhibits no edema, tenderness or deformity  Lymphadenopathy:     He has no cervical adenopathy  Neurological: He is alert and oriented to person, place, and time  Skin: Skin is warm and dry  No rash noted  He is not diaphoretic  No erythema  Psychiatric: He has a normal mood and affect  His behavior is normal    Nursing note and vitals reviewed        ED Medications  Medications   sodium chloride 0 9 % bolus 1,000 mL (0 mL Intravenous Stopped 12/29/17 2014)   acetaminophen (TYLENOL) tablet 975 mg (975 mg Oral Given 12/29/17 1813)       Diagnostic Studies  Results Reviewed     Procedure Component Value Units Date/Time    Comprehensive metabolic panel [[id number]]  (Abnormal) Collected:  12/29/17 7548    Lab Status: Final result Specimen:  Blood from Arm, Right Updated:  12/29/17 1859     Sodium 138 mmol/L      Potassium 3 4 (L) mmol/L      Chloride 102 mmol/L      CO2 26 mmol/L      Anion Gap 10 mmol/L      BUN 15 mg/dL      Creatinine 1 02 mg/dL      Glucose 104 mg/dL      Calcium 8 7 mg/dL      AST 15 U/L      ALT 16 U/L      Alkaline Phosphatase 57 U/L      Total Protein 7 0 g/dL      Albumin 2 9 (L) g/dL      Total Bilirubin 0 60 mg/dL      eGFR 68 ml/min/1 73sq m     Narrative:         National Kidney Disease Education Program recommendations are as follows:  GFR calculation is accurate only with a steady state creatinine  Chronic Kidney disease less than 60 ml/min/1 73 sq  meters  Kidney failure less than 15 ml/min/1 73 sq  meters  CBC and differential [[id number]]  (Abnormal) Collected:  12/29/17 1758    Lab Status:  Final result Specimen:  Blood from Arm, Right Updated:  12/29/17 1812     WBC 11 12 (H) Thousand/uL      RBC 3 68 (L) Million/uL      Hemoglobin 10 2 (L) g/dL      Hematocrit 33 9 (L) %      MCV 92 fL      MCH 27 7 pg      MCHC 30 1 (L) g/dL      RDW 16 3 (H) %      MPV 10 4 fL      Platelets 582 (L) Thousands/uL      Neutrophils Relative 89 (H) %      Lymphocytes Relative 5 (L) %      Monocytes Relative 6 %      Eosinophils Relative 0 %      Basophils Relative 0 %      Neutrophils Absolute 9 80 (H) Thousands/µL      Lymphocytes Absolute 0 59 (L) Thousands/µL      Monocytes Absolute 0 71 Thousand/µL      Eosinophils Absolute 0 01 Thousand/µL      Basophils Absolute 0 01 Thousands/µL                  XR chest 2 views   ED Interpretation by Faby Cruz PA-C (12/29 1845)   Clear lungs      Final Result by Roberto Narayanan MD (12/29 1848)      No focal airspace consolidation  Mild cardio megaly  Workstation performed: [id number]         CT head without contrast   Final Result by Pura Gibbons MD (12/29 1756)      No acute intracranial abnormality           Workstation performed: [id number]                    Procedures  Procedures       Phone Contacts  ED Phone Contact    ED Course  ED Course                                MDM  Number of Diagnoses or Management Options  Fall, initial encounter:   Injury of head, initial encounter:   Diagnosis management comments:   Differential diagnosis includes but not limited to:    Upper respiratory infection, bronchitis, pneumonia, urinary tract infection, fall, intracranial injury  With       Amount and/or Complexity of Data Reviewed  Clinical lab tests: ordered and reviewed  Tests in the radiology section of CPT®: ordered and reviewed  Obtain history from someone other than the patient: yes  Independent visualization of images, tracings, or specimens: yes      CritCare Time    Disposition  Final diagnoses:   Fall, initial encounter   Injury of head, initial encounter     Time reflects when diagnosis was documented in both MDM as applicable and the Disposition within this note     Time User Action Codes Description Comment    12/29/2017  8:20 PM Sarkis So 26 [I04  DLCQ] Fall, initial encounter     12/29/2017  8:21 PM Regi Castellano Add [S09 90XA] Injury of head, initial encounter       ED Disposition     ED Disposition Condition Comment    Discharge  Pema Myles 42 discharge to home/self care      Condition at discharge: Stable        Follow-up Information     Follow up With Specialties Details Why [address] Jacquie Yanez MD  Schedule an appointment as soon as possible for a visit  [address] [place] [address]  [phone]          Discharge Medication List as of 12/29/2017  8:21 PM      CONTINUE these medications which have NOT CHANGED    Details   acetaminophen (TYLENOL) 325 mg tablet Take 650 mg by mouth every 6 (six) hours as needed for mild pain, Historical Med      ALPRAZolam (XANAX) 0 25 mg tablet Take 0 25 mg by mouth 2 (two) times a day as needed for anxiety, Historical Med      cholecalciferol (VITAMIN D3) 1,000 units tablet Take 1,000 Units by mouth daily, Historical Med      cyanocobalamin (VITAMIN B-12) 1,000 mcg tablet Take by mouth daily, Historical Med      ferrous sulfate 325 (65 Fe) mg tablet Take 325 mg by mouth daily with breakfast, Historical Med      levothyroxine 25 mcg tablet Take 25 mcg by mouth daily, Historical Med      Multiple Vitamin (MULTIVITAMIN) tablet Take 1 tablet by mouth daily, Historical Med           No discharge procedures on file      ED Provider  Electronically Signed by           Justo Kidd PA-C  12/31/17 1958

## 2018-01-09 ENCOUNTER — TRANSCRIBE ORDERS (OUTPATIENT)
Dept: ADMINISTRATIVE | Facility: HOSPITAL | Age: 83
End: 2018-01-09

## 2018-01-09 DIAGNOSIS — R10.9 ABDOMINAL PAIN, UNSPECIFIED ABDOMINAL LOCATION: Primary | ICD-10-CM

## 2018-01-10 ENCOUNTER — HOSPITAL ENCOUNTER (INPATIENT)
Facility: HOSPITAL | Age: 83
LOS: 3 days | Discharge: HOME HEALTH | DRG: 872 | End: 2018-01-13
Attending: EMERGENCY MEDICINE | Admitting: FAMILY MEDICINE
Payer: MEDICARE

## 2018-01-10 ENCOUNTER — APPOINTMENT (EMERGENCY)
Dept: CT IMAGING | Facility: HOSPITAL | Age: 83
DRG: 872 | End: 2018-01-10
Payer: MEDICARE

## 2018-01-10 DIAGNOSIS — A41.9 SEPSIS, DUE TO UNSPECIFIED ORGANISM: Primary | ICD-10-CM

## 2018-01-10 DIAGNOSIS — N39.0 COMPLICATED UTI (URINARY TRACT INFECTION): ICD-10-CM

## 2018-01-10 PROBLEM — E03.9 HYPOTHYROIDISM: Status: ACTIVE | Noted: 2018-01-10

## 2018-01-10 LAB
ALBUMIN SERPL BCP-MCNC: 3.1 G/DL (ref 3.5–5)
ALP SERPL-CCNC: 74 U/L (ref 46–116)
ALT SERPL W P-5'-P-CCNC: 15 U/L (ref 12–78)
ANION GAP SERPL CALCULATED.3IONS-SCNC: 8 MMOL/L (ref 4–13)
APTT PPP: 30 SECONDS (ref 23–35)
AST SERPL W P-5'-P-CCNC: 14 U/L (ref 5–45)
BACTERIA UR QL AUTO: ABNORMAL /HPF
BASOPHILS # BLD MANUAL: 0 THOUSAND/UL (ref 0–0.1)
BASOPHILS NFR MAR MANUAL: 0 % (ref 0–1)
BILIRUB SERPL-MCNC: 0.6 MG/DL (ref 0.2–1)
BILIRUB UR QL STRIP: NEGATIVE
BUN SERPL-MCNC: 11 MG/DL (ref 5–25)
CALCIUM SERPL-MCNC: 9.4 MG/DL (ref 8.3–10.1)
CHLORIDE SERPL-SCNC: 101 MMOL/L (ref 100–108)
CLARITY UR: ABNORMAL
CO2 SERPL-SCNC: 25 MMOL/L (ref 21–32)
COLOR UR: YELLOW
CREAT SERPL-MCNC: 1.16 MG/DL (ref 0.6–1.3)
EOSINOPHIL # BLD MANUAL: 0 THOUSAND/UL (ref 0–0.4)
EOSINOPHIL NFR BLD MANUAL: 0 % (ref 0–6)
ERYTHROCYTE [DISTWIDTH] IN BLOOD BY AUTOMATED COUNT: 14.9 % (ref 11.6–15.1)
GFR SERPL CREATININE-BSD FRML MDRD: 58 ML/MIN/1.73SQ M
GLUCOSE SERPL-MCNC: 103 MG/DL (ref 65–140)
GLUCOSE UR STRIP-MCNC: NEGATIVE MG/DL
HCT VFR BLD AUTO: 32.7 % (ref 36.5–49.3)
HGB BLD-MCNC: 10.1 G/DL (ref 12–17)
HGB UR QL STRIP.AUTO: ABNORMAL
INR PPP: 1.01 (ref 0.86–1.16)
KETONES UR STRIP-MCNC: ABNORMAL MG/DL
LACTATE SERPL-SCNC: 0.8 MMOL/L (ref 0.5–2)
LEUKOCYTE ESTERASE UR QL STRIP: ABNORMAL
LG PLATELETS BLD QL SMEAR: PRESENT
LYMPHOCYTES # BLD AUTO: 0.11 THOUSAND/UL (ref 0.6–4.47)
LYMPHOCYTES # BLD AUTO: 1 % (ref 14–44)
MAGNESIUM SERPL-MCNC: 2.2 MG/DL (ref 1.6–2.6)
MCH RBC QN AUTO: 28.1 PG (ref 26.8–34.3)
MCHC RBC AUTO-ENTMCNC: 30.9 G/DL (ref 31.4–37.4)
MCV RBC AUTO: 91 FL (ref 82–98)
MONOCYTES # BLD AUTO: 0.67 THOUSAND/UL (ref 0–1.22)
MONOCYTES NFR BLD: 6 % (ref 4–12)
NEUTROPHILS # BLD MANUAL: 10.34 THOUSAND/UL (ref 1.85–7.62)
NEUTS BAND NFR BLD MANUAL: 1 % (ref 0–8)
NEUTS SEG NFR BLD AUTO: 91 % (ref 43–75)
NITRITE UR QL STRIP: POSITIVE
NON-SQ EPI CELLS URNS QL MICRO: ABNORMAL /HPF
PH UR STRIP.AUTO: 6 [PH] (ref 4.5–8)
PLATELET # BLD AUTO: 282 THOUSANDS/UL (ref 149–390)
PLATELET BLD QL SMEAR: ADEQUATE
PMV BLD AUTO: 10.2 FL (ref 8.9–12.7)
POTASSIUM SERPL-SCNC: 3.6 MMOL/L (ref 3.5–5.3)
PROT SERPL-MCNC: 7.3 G/DL (ref 6.4–8.2)
PROT UR STRIP-MCNC: NEGATIVE MG/DL
PROTHROMBIN TIME: 13.6 SECONDS (ref 12.1–14.4)
RBC # BLD AUTO: 3.59 MILLION/UL (ref 3.88–5.62)
RBC #/AREA URNS AUTO: ABNORMAL /HPF
SODIUM SERPL-SCNC: 134 MMOL/L (ref 136–145)
SP GR UR STRIP.AUTO: 1.01 (ref 1–1.03)
TOTAL CELLS COUNTED SPEC: 100
UROBILINOGEN UR QL STRIP.AUTO: 0.2 E.U./DL
VARIANT LYMPHS # BLD AUTO: 1 %
WBC # BLD AUTO: 11.24 THOUSAND/UL (ref 4.31–10.16)
WBC #/AREA URNS AUTO: ABNORMAL /HPF

## 2018-01-10 PROCEDURE — 87086 URINE CULTURE/COLONY COUNT: CPT | Performed by: EMERGENCY MEDICINE

## 2018-01-10 PROCEDURE — 93005 ELECTROCARDIOGRAM TRACING: CPT

## 2018-01-10 PROCEDURE — 83605 ASSAY OF LACTIC ACID: CPT | Performed by: EMERGENCY MEDICINE

## 2018-01-10 PROCEDURE — 87186 SC STD MICRODIL/AGAR DIL: CPT | Performed by: EMERGENCY MEDICINE

## 2018-01-10 PROCEDURE — 85007 BL SMEAR W/DIFF WBC COUNT: CPT | Performed by: EMERGENCY MEDICINE

## 2018-01-10 PROCEDURE — 85610 PROTHROMBIN TIME: CPT | Performed by: EMERGENCY MEDICINE

## 2018-01-10 PROCEDURE — 87040 BLOOD CULTURE FOR BACTERIA: CPT | Performed by: EMERGENCY MEDICINE

## 2018-01-10 PROCEDURE — 85027 COMPLETE CBC AUTOMATED: CPT | Performed by: EMERGENCY MEDICINE

## 2018-01-10 PROCEDURE — 81001 URINALYSIS AUTO W/SCOPE: CPT | Performed by: EMERGENCY MEDICINE

## 2018-01-10 PROCEDURE — 87077 CULTURE AEROBIC IDENTIFY: CPT | Performed by: EMERGENCY MEDICINE

## 2018-01-10 PROCEDURE — 36415 COLL VENOUS BLD VENIPUNCTURE: CPT | Performed by: EMERGENCY MEDICINE

## 2018-01-10 PROCEDURE — 74177 CT ABD & PELVIS W/CONTRAST: CPT

## 2018-01-10 PROCEDURE — 85730 THROMBOPLASTIN TIME PARTIAL: CPT | Performed by: EMERGENCY MEDICINE

## 2018-01-10 PROCEDURE — 80053 COMPREHEN METABOLIC PANEL: CPT | Performed by: EMERGENCY MEDICINE

## 2018-01-10 PROCEDURE — 96365 THER/PROPH/DIAG IV INF INIT: CPT

## 2018-01-10 PROCEDURE — 83735 ASSAY OF MAGNESIUM: CPT | Performed by: EMERGENCY MEDICINE

## 2018-01-10 RX ADMIN — SODIUM CHLORIDE 1000 ML: 0.9 INJECTION, SOLUTION INTRAVENOUS at 20:47

## 2018-01-10 RX ADMIN — CEFEPIME HYDROCHLORIDE 2000 MG: 2 INJECTION, POWDER, FOR SOLUTION INTRAVENOUS at 21:33

## 2018-01-10 RX ADMIN — IOHEXOL 100 ML: 350 INJECTION, SOLUTION INTRAVENOUS at 21:29

## 2018-01-10 RX ADMIN — SODIUM CHLORIDE 1000 ML: 0.9 INJECTION, SOLUTION INTRAVENOUS at 21:33

## 2018-01-11 PROBLEM — A41.9 SEPSIS (HCC): Status: ACTIVE | Noted: 2018-01-11

## 2018-01-11 PROBLEM — E87.6 HYPOKALEMIA: Status: ACTIVE | Noted: 2018-01-11

## 2018-01-11 PROBLEM — D72.829 LEUKOCYTOSIS: Status: ACTIVE | Noted: 2018-01-11

## 2018-01-11 LAB
ANION GAP SERPL CALCULATED.3IONS-SCNC: 9 MMOL/L (ref 4–13)
ATRIAL RATE: 87 BPM
BUN SERPL-MCNC: 8 MG/DL (ref 5–25)
CALCIUM SERPL-MCNC: 8.2 MG/DL (ref 8.3–10.1)
CHLORIDE SERPL-SCNC: 108 MMOL/L (ref 100–108)
CO2 SERPL-SCNC: 23 MMOL/L (ref 21–32)
CREAT SERPL-MCNC: 0.98 MG/DL (ref 0.6–1.3)
ERYTHROCYTE [DISTWIDTH] IN BLOOD BY AUTOMATED COUNT: 15 % (ref 11.6–15.1)
GFR SERPL CREATININE-BSD FRML MDRD: 71 ML/MIN/1.73SQ M
GLUCOSE SERPL-MCNC: 87 MG/DL (ref 65–140)
HCT VFR BLD AUTO: 29.7 % (ref 36.5–49.3)
HGB BLD-MCNC: 8.8 G/DL (ref 12–17)
LACTATE SERPL-SCNC: 0.7 MMOL/L (ref 0.5–2)
MCH RBC QN AUTO: 27.2 PG (ref 26.8–34.3)
MCHC RBC AUTO-ENTMCNC: 29.6 G/DL (ref 31.4–37.4)
MCV RBC AUTO: 92 FL (ref 82–98)
P AXIS: 53 DEGREES
PLATELET # BLD AUTO: 217 THOUSANDS/UL (ref 149–390)
PMV BLD AUTO: 9.9 FL (ref 8.9–12.7)
POTASSIUM SERPL-SCNC: 3.3 MMOL/L (ref 3.5–5.3)
PR INTERVAL: 124 MS
QRS AXIS: -6 DEGREES
QRSD INTERVAL: 92 MS
QT INTERVAL: 352 MS
QTC INTERVAL: 423 MS
RBC # BLD AUTO: 3.23 MILLION/UL (ref 3.88–5.62)
SODIUM SERPL-SCNC: 140 MMOL/L (ref 136–145)
T WAVE AXIS: -51 DEGREES
VENTRICULAR RATE: 87 BPM
WBC # BLD AUTO: 8.59 THOUSAND/UL (ref 4.31–10.16)

## 2018-01-11 PROCEDURE — 36415 COLL VENOUS BLD VENIPUNCTURE: CPT | Performed by: EMERGENCY MEDICINE

## 2018-01-11 PROCEDURE — 99285 EMERGENCY DEPT VISIT HI MDM: CPT

## 2018-01-11 PROCEDURE — 87081 CULTURE SCREEN ONLY: CPT | Performed by: PHYSICIAN ASSISTANT

## 2018-01-11 PROCEDURE — 80048 BASIC METABOLIC PNL TOTAL CA: CPT | Performed by: PHYSICIAN ASSISTANT

## 2018-01-11 PROCEDURE — 85027 COMPLETE CBC AUTOMATED: CPT | Performed by: PHYSICIAN ASSISTANT

## 2018-01-11 PROCEDURE — 83605 ASSAY OF LACTIC ACID: CPT | Performed by: EMERGENCY MEDICINE

## 2018-01-11 RX ORDER — MELATONIN
1000 DAILY
Status: DISCONTINUED | OUTPATIENT
Start: 2018-01-11 | End: 2018-01-13 | Stop reason: HOSPADM

## 2018-01-11 RX ORDER — ALPRAZOLAM 0.25 MG/1
0.25 TABLET ORAL 2 TIMES DAILY PRN
Status: DISCONTINUED | OUTPATIENT
Start: 2018-01-11 | End: 2018-01-13 | Stop reason: HOSPADM

## 2018-01-11 RX ORDER — ACETAMINOPHEN 325 MG/1
650 TABLET ORAL EVERY 6 HOURS PRN
Status: DISCONTINUED | OUTPATIENT
Start: 2018-01-11 | End: 2018-01-13 | Stop reason: HOSPADM

## 2018-01-11 RX ORDER — ONDANSETRON 2 MG/ML
4 INJECTION INTRAMUSCULAR; INTRAVENOUS EVERY 6 HOURS PRN
Status: DISCONTINUED | OUTPATIENT
Start: 2018-01-11 | End: 2018-01-13 | Stop reason: HOSPADM

## 2018-01-11 RX ORDER — LEVOTHYROXINE SODIUM 0.03 MG/1
25 TABLET ORAL
Status: DISCONTINUED | OUTPATIENT
Start: 2018-01-11 | End: 2018-01-13 | Stop reason: HOSPADM

## 2018-01-11 RX ORDER — HEPARIN SODIUM 5000 [USP'U]/ML
5000 INJECTION, SOLUTION INTRAVENOUS; SUBCUTANEOUS EVERY 8 HOURS SCHEDULED
Status: DISCONTINUED | OUTPATIENT
Start: 2018-01-11 | End: 2018-01-13 | Stop reason: HOSPADM

## 2018-01-11 RX ORDER — POTASSIUM CHLORIDE 20 MEQ/1
40 TABLET, EXTENDED RELEASE ORAL ONCE
Status: COMPLETED | OUTPATIENT
Start: 2018-01-11 | End: 2018-01-11

## 2018-01-11 RX ORDER — FERROUS SULFATE 325(65) MG
325 TABLET ORAL
Status: DISCONTINUED | OUTPATIENT
Start: 2018-01-11 | End: 2018-01-13 | Stop reason: HOSPADM

## 2018-01-11 RX ORDER — CHOLECALCIFEROL (VITAMIN D3) 125 MCG
1000 CAPSULE ORAL DAILY
Status: DISCONTINUED | OUTPATIENT
Start: 2018-01-11 | End: 2018-01-13 | Stop reason: HOSPADM

## 2018-01-11 RX ADMIN — CEFTRIAXONE SODIUM 1000 MG: 10 INJECTION, POWDER, FOR SOLUTION INTRAVENOUS at 13:09

## 2018-01-11 RX ADMIN — HEPARIN SODIUM 5000 UNITS: 5000 INJECTION, SOLUTION INTRAVENOUS; SUBCUTANEOUS at 21:57

## 2018-01-11 RX ADMIN — CYANOCOBALAMIN TAB 500 MCG 1000 MCG: 500 TAB at 11:30

## 2018-01-11 RX ADMIN — HEPARIN SODIUM 5000 UNITS: 5000 INJECTION, SOLUTION INTRAVENOUS; SUBCUTANEOUS at 07:17

## 2018-01-11 RX ADMIN — HEPARIN SODIUM 5000 UNITS: 5000 INJECTION, SOLUTION INTRAVENOUS; SUBCUTANEOUS at 14:43

## 2018-01-11 RX ADMIN — CHOLECALCIFEROL TAB 25 MCG (1000 UNIT) 1000 UNITS: 25 TAB at 11:29

## 2018-01-11 RX ADMIN — POTASSIUM CHLORIDE 40 MEQ: 1500 TABLET, EXTENDED RELEASE ORAL at 13:11

## 2018-01-11 RX ADMIN — LEVOTHYROXINE SODIUM 25 MCG: 25 TABLET ORAL at 07:15

## 2018-01-11 RX ADMIN — FERROUS SULFATE TAB 325 MG (65 MG ELEMENTAL FE) 325 MG: 325 (65 FE) TAB at 11:27

## 2018-01-11 NOTE — PROGRESS NOTES
Progress Note - Loren Jay 1934, 80 y o  male MRN: [id number]    Unit/Bed#: ED 11 Encounter: [id number]    Primary Care Provider: Abigail Rivera MD   Date and time admitted to hospital: 1/10/2018  7:10 PM        UTI (urinary tract infection)   Assessment & Plan    · Presented from [facility] with fever  · TMax 100 9  · Leukocytosis of 11 24 at presentation and currently normalized  · Urine culture is [id number] on the cultures and continue with the broad-spectrum antibiotics        Hypertension   Assessment & Plan    Continue with the current care        Bladder cancer [facility])   Assessment & Plan    · Bladder/prostate/colon cancer, s/p bladder cystectomy and radical prostatectomy with ileo conduit  · Has non-healing surgical wound adjacent to urostomy without surrounding erythema, edema, warmth  · CT scan report [id number] with the Urology as an outpatient  · No definite abscess on the CT scan  Monitor clinically         Hypothyroidism   Assessment & Plan    · Continue synthroid  Sepsis ([address] )   Assessment & Plan    · Present at the time of admission as documented by leukocytosis fever and tachycardia  ·   Appears to be improving  · Likely secondary to urinary tract infection            VTE Pharmacologic Prophylaxis:   Pharmacologic: Heparin  Mechanical VTE Prophylaxis in Place: Yes    Patient Centered Rounds: I have performed bedside rounds with nursing staff today  Discussions with Specialists or Other Care Team Provider:     Education and Discussions with Family / Patient:  Called wife,updated over phone    Time Spent for Care: 30 minutes  More than 50% of total time spent on counseling and coordination of care as described above      Current Length of Stay: 1 day(s)    Current Patient Status: Inpatient   Certification Statement: The patient will continue to require additional inpatient hospital stay due to Pending urine culture    Discharge Plan:     Code Status: Prior      Subjective:   Patient seen and examined  Patient with dementia so history is rather limited  Denies any specific complaints or any pain  Objective:     Vitals:   Temp (24hrs), Av 4 °F (38 °C), Min:99 9 °F (37 7 °C), Max:100 9 °F (38 3 °C)    HR:  [] 75  Resp:  [18-19] 18  BP: ()/(48-79) 105/59  SpO2:  [95 %-99 %] 95 %  Body mass index is 22 6 kg/m²  Input and Output Summary (last 24 hours): Intake/Output Summary (Last 24 hours) at 18 1243  Last data filed at 18 1012   Gross per 24 hour   Intake             2300 ml   Output             1565 ml   Net              735 ml       Physical Exam:     Physical Exam   Constitutional: He appears well-developed and well-nourished  HENT:   Head: Normocephalic and atraumatic  Eyes: EOM are normal  Pupils are equal, round, and reactive to light  Neck: Normal range of motion  Neck supple  Cardiovascular: Normal rate and regular rhythm  Pulmonary/Chest: Effort normal and breath sounds normal  No respiratory distress  Abdominal: Soft  He exhibits no distension  The ileal conduit draining yellowish urine  Colostomy present-no diarrhea noted in the colostomy   Musculoskeletal: Normal range of motion  He exhibits no edema  Neurological: He is alert  No cranial nerve deficit  Skin: Skin is warm and dry  No erythema         Additional Data:     Labs:      Results from last 7 days  Lab Units 18  0624 01/10/18  2041   WBC Thousand/uL 8 59 11 24*   HEMOGLOBIN g/dL 8 8* 10 1*   HEMATOCRIT % 29 7* 32 7*   PLATELETS Thousands/uL 217 282   LYMPHO PCT %  --  1*   MONO PCT MAN %  --  6   EOSINO PCT MANUAL %  --  0       Results from last 7 days  Lab Units 18  0624 01/10/18  2041   SODIUM mmol/L 140 134*   POTASSIUM mmol/L 3 3* 3 6   CHLORIDE mmol/L 108 101   CO2 mmol/L 23 25   BUN mg/dL 8 11   CREATININE mg/dL 0 98 1 16   CALCIUM mg/dL 8 2* 9 4   TOTAL PROTEIN g/dL  --  7 3   BILIRUBIN TOTAL mg/dL  --  0 60   ALK PHOS U/L  --  74   ALT U/L  --  15   AST U/L  --  14   GLUCOSE RANDOM mg/dL 87 103       Results from last 7 days  Lab Units 01/10/18  2041   INR  1 01       * I Have Reviewed All Lab Data Listed Above  * Additional Pertinent Lab Tests Reviewed: Fel[address] Admission Reviewed    Imaging:    Imaging Reports Reviewed Today Include:  CT scan  Imaging Personally Reviewed by Myself Includes:  CT scan    Recent Cultures (last 7 days):       Results from last 7 days  Lab Units 01/10/18  2057   GRAM STAIN RESULT  Gram negative rods       Last 24 Hours Medication List:     cefTRIAXone 1,000 mg Intravenous Q24H   cholecalciferol 1,000 Units Oral Daily   cyanocobalamin 1,000 mcg Oral Daily   ferrous sulfate 325 mg Oral Daily With Breakfast   heparin (porcine) 5,000 Units Subcutaneous Q8H [facility] long term   levothyroxine 25 mcg Oral Early Morning   potassium chloride 40 mEq Oral Once        Today, Patient Was Seen By: Marine Haas MD    ** Please Note: Dictation voice to text software may have been used in the creation of this document   **

## 2018-01-11 NOTE — PHYSICIAN ADVISOR
Current patient class: Inpatient  The patient is currently on Hospital Day: 2      The patient was admitted to the hospital at 2244 on 1/10/18 for the following diagnosis:  Fever [R50 9]       There is documentation in the medical record of an expected length of stay of at least 2 midnights  The patient is therefore expected to satisfy the 2 midnight benchmark and given the 2 midnight presumption is appropriate for INPATIENT ADMISSION  Given this expectation of a satisfying stay, CMS instructs us that the patient is most often appropriate for inpatient admission under part A provided medical necessity is documented in the chart  After review of the relevant documentation, labs, vital signs and test results, the patient is appropriate for INPATIENT ADMISSION  Admission to the hospital as an inpatient is a complex decision making process which requires the practitioner to consider the patients presenting complaint, history and physical examination and all relevant testing  With this in mind, in this case, the patient was deemed appropriate for INPATIENT ADMISSION  After review of the documentation and testing available at the time of the admission I concur with this clinical determination of medical necessity  Rationale is as follows: The patient is an 22-year-old male who presented to the hospital on January 10, 2018 because of fever  The patient has dementia  The review of systems was notable for abdominal pain, but limited  He is status post cystectomy, radical prostatectomy, and ileal conduit because of cancer  There was mild right hydroureter on imaging and questionable right hydronephrosis possibly from stricturing at the anastomosis  There was mild increased density along the right pelvic rectus abdominus muscle which could be infectious/inflammatory  Leukocytosis was present  The patient was started on intravenous ceftriaxone for the possibility of urinary tract infection      The patient is still early in the course of the hospitalization  He has had recurrent hypotension, 83/48 today  He will be continued on cefepime pending further clinical data  Inpatient level of care is appropriate      The patients vitals on arrival were ED Triage Vitals [01/10/18 1919]   Temperature Pulse Respirations Blood Pressure SpO2   99 9 °F (37 7 °C) 104 18 95/60 96 %      Temp Source Heart Rate Source Patient Position - Orthostatic VS BP Location FiO2 (%)   Axillary Monitor Sitting Right arm --      Pain Score       No Pain           Past Medical History:   Diagnosis Date    [facility] [address] [facility]     Bursitis     shoulders and back    Cancer ([facility] [address] [facility]     bladder, prostate    Coronary artery disease     Hard of hearing     Hyperlipidemia     Hypertension     Hypothyroidism 1/10/2018    Memory loss     Myocardial infarction 1987    Prostate CA ([facility] [facility]     UTI (urinary tract infection) 1/10/2018    Vascular dementia      Past Surgical History:   Procedure Laterality Date    CATARACT EXTRACTION      COLONOSCOPY      COLOSTOMY      CYSTECTOMY W/ URETEROILEAL CONDUIT      IN LAP,SURG,COLECTOMY,W/REMVL TERM ILEUM Right 7/21/2017    Procedure: LAPAROSCOPIC ASSISTED COLON RESECITON, RIGHT COLON RESECTION, REPAIR INCISIONAL HERNIA;  Surgeon: Elva Mejía MD;  [facility];  Service: General    [address] [place] [address] N/A 7/21/2017    Procedure: OPEN REPAIR PARA-STOMAL HERNIA;  Surgeon: Elva Mejía MD;  [facility];  Service: General           Consults have been placed to:   IP CONSULT TO CASE MANAGEMENT    Vitals:    01/11/18 0547 01/11/18 0551 01/11/18 0606 01/11/18 0625   BP: (!) 83/48 95/53 105/59    Pulse:   75    Resp:   18    Temp:       TempSrc:       SpO2:   95%    Weight:    63 5 kg (140 lb)   Height:    5' 6" (1 676 m)       Most recent labs:    Recent Labs      01/10/18   2041  01/11/18   0624   WBC  11 24*  8 59   HGB  10 1*  8 8*   HCT  32 7*  29 7*   PLT  282 217   K  3 6  3 3*   NA  134*  140   CALCIUM  9 4  8 2*   BUN  11  8   CREATININE  1 16  0 98   INR  1 01   --    AST  14   --    ALT  15   --    ALKPHOS  74   --    BILITOT  0 60   --        Scheduled Meds:  cefTRIAXone 1,000 mg Intravenous Q24H   cholecalciferol 1,000 Units Oral Daily   cyanocobalamin 1,000 mcg Oral Daily   ferrous sulfate 325 mg Oral Daily With Breakfast   heparin (porcine) 5,000 Units Subcutaneous Q8H [facility]   levothyroxine 25 mcg Oral Early Morning     Continuous Infusions:   PRN Meds:   acetaminophen    ALPRAZolam    ondansetron    Surgical procedures (if appropriate):

## 2018-01-11 NOTE — ED PROVIDER NOTES
History  Chief Complaint   Patient presents with    Fever - 75 years or older     Pt was sent by facility because of fever and belly pain  Pt has history of dementia  Pt has a new colostomy and the facility was worried about sepsis  Pt is scheduled for a CT scan on Monday for increased drainage from a recent colon surgery  This pleasantly demented 51-year-old male presents from a local nursing home for fever  Patient cannot provide any historical information or answer any questions  Patient does have a complicated medical history of having had colon cancer with significant resection resulting in an ileal conduit for urine as well as a chronic ostomy for an intra-abdominal wound  Wife noted there has been no drainage from this wound today which is highly unusual   Wife knows of no vomiting or diarrhea  Patient also has not been noted to be coughing or short of breath  History provided by:  Spouse and nursing home  History limited by:  Dementia   used: No    Fever - 75 years or older   Max temp prior to arrival:  102  Temp source:  Oral  Severity:  Mild  Onset quality:  Sudden  Duration:  1 day  Timing:  Unable to specify  Chronicity:  Recurrent  Relieved by:  None tried  Ineffective treatments:  None tried      Prior to Admission Medications   Prescriptions Last Dose Informant Patient Reported? Taking?    ALPRAZolam (XANAX) 0 25 mg tablet   Yes No   Sig: Take 0 25 mg by mouth 2 (two) times a day as needed for anxiety   Multiple Vitamin (MULTIVITAMIN) tablet   Yes No   Sig: Take 1 tablet by mouth daily   acetaminophen (TYLENOL) 325 mg tablet   Yes No   Sig: Take 650 mg by mouth every 6 (six) hours as needed for mild pain   cholecalciferol (VITAMIN D3) 1,000 units tablet   Yes No   Sig: Take 1,000 Units by mouth daily   cyanocobalamin (VITAMIN B-12) 1,000 mcg tablet   Yes No   Sig: Take by mouth daily   ferrous sulfate 325 (65 Fe) mg tablet   Yes No   Sig: Take 325 mg by mouth daily with breakfast   levothyroxine 25 mcg tablet   Yes No   Sig: Take 25 mcg by mouth daily      Facility-Administered Medications: None       Past Medical History:   Diagnosis Date    Bladder cancer ([facility] )     Bursitis     shoulders and back    Cancer ([facility] )     bladder, prostate    Coronary artery disease     Hard of hearing     Hyperlipidemia     Hypertension     Memory loss     Myocardial infarction 1987    Prostate CA ([facility] )     Vascular dementia        Past Surgical History:   Procedure Laterality Date    CATARACT EXTRACTION      COLONOSCOPY      COLOSTOMY      CYSTECTOMY W/ URETEROILEAL CONDUIT      TX LAP,SURG,COLECTOMY,W/REMVL TERM ILEUM Right 7/21/2017    Procedure: LAPAROSCOPIC ASSISTED COLON RESECITON, RIGHT COLON RESECTION, REPAIR INCISIONAL HERNIA;  Surgeon: Cynthia Vazquez MD;  Location: [facility];  Service: General    [address] [place] [address] N/A 7/21/2017    Procedure: OPEN REPAIR PARA-STOMAL HERNIA;  Surgeon: Cynthia Vazquez MD;  Location[facility];  Service: General       History reviewed  No pertinent family history  I have reviewed and agree with the history as documented  Social History   Substance Use Topics    Smoking status: Former Smoker     Quit date: 1987    Smokeless tobacco: Former User    Alcohol use No        Review of Systems   Unable to perform ROS: Dementia       Physical Exam  ED Triage Vitals [01/10/18 1919]   Temperature Pulse Respirations Blood Pressure SpO2   99 9 °F (37 7 °C) 104 18 95/60 96 %      Temp Source Heart Rate Source Patient Position - Orthostatic VS BP Location FiO2 (%)   Axillary Monitor Sitting Right arm --      Pain Score       No Pain           Orthostatic Vital Signs  Vitals:    01/10/18 2015 01/10/18 2100 01/10/18 2138 01/10/18 2201   BP: (!) 88/53 98/57 111/54 107/53   Pulse: 88 76 84 81   Patient Position - Orthostatic VS: Lying Lying Lying Lying       Physical Exam   Constitutional: He appears well-developed and well-nourished  He is cooperative  No distress  HENT:   Head: Normocephalic and atraumatic  Mouth/Throat: Oropharynx is clear and moist  Mucous membranes are dry  Eyes: EOM and lids are normal  Pupils are equal, round, and reactive to light  Right eye exhibits no discharge  Left eye exhibits no discharge  Right conjunctiva is not injected  Left conjunctiva is not injected  Neck: Trachea normal, normal range of motion, full passive range of motion without pain and phonation normal  Neck supple  Cardiovascular: Regular rhythm, normal heart sounds, intact distal pulses and normal pulses  Tachycardia present  No murmur heard  Pulses:       Dorsalis pedis pulses are 2+ on the right side, and 2+ on the left side  Pulmonary/Chest: Effort normal and breath sounds normal  He exhibits no tenderness  Abdominal: Soft  He exhibits no distension  There is no tenderness  Patient with two ostomy bags one containing urine along the midline, a 2nd just to the left of midline that is empty  Musculoskeletal: Normal range of motion  He exhibits no edema  Neurological: He is alert  He is disoriented  Skin: Skin is warm, dry and intact  Capillary refill takes less than 2 seconds  No rash noted  Psychiatric: He has a normal mood and affect  His speech is normal and behavior is normal    Vitals reviewed        ED Medications  Medications   sodium chloride 0 9 % bolus 1,000 mL (0 mL Intravenous Stopped 1/10/18 2117)     Followed by   sodium chloride 0 9 % bolus 1,000 mL (1,000 mL Intravenous New Bag 1/10/18 2133)   cefepime (MAXIPIME) 2 g/50 mL dextrose IVPB (0 mg Intravenous Stopped 1/10/18 2217)   iohexol (OMNIPAQUE) 350 MG/ML injection (MULTI-DOSE) 100 mL (100 mL Intravenous Given 1/10/18 2129)       Diagnostic Studies  Results Reviewed     Procedure Component Value Units Date/Time    CBC and differential [[id number]]  (Abnormal) Collected:  01/10/18 2041    Lab Status:  Final result Specimen:  Blood from Arm, Left Updated: 01/10/18 2141     WBC 11 24 (H) Thousand/uL      RBC 3 59 (L) Million/uL      Hemoglobin 10 1 (L) g/dL      Hematocrit 32 7 (L) %      MCV 91 fL      MCH 28 1 pg      MCHC 30 9 (L) g/dL      RDW 14 9 %      MPV 10 2 fL      Platelets 056 Thousands/uL     Narrative: This is an appended report  These results have been appended to a previously verified report  Urine Microscopic [[id number]]  (Abnormal) Collected:  01/10/18 2110    Lab Status:  Final result Specimen:  Urine from Urine, Other Updated:  01/10/18 2135     RBC, UA None Seen /hpf      WBC, UA 20-30 (A) /hpf      Epithelial Cells None Seen /hpf      Bacteria, UA Moderate (A) /hpf     Urine culture [[id number]] Collected:  01/10/18 2110    Lab Status: In process Specimen:  Urine from Urine, Other Updated:  01/10/18 2135    UA w Reflex to Microscopic w Reflex to Culture [[id number]]  (Abnormal) Collected:  01/10/18 2110    Lab Status:  Final result Specimen:  Urine from Urine, Other Updated:  01/10/18 2117     Color, UA Yellow     Clarity, UA Cloudy     Specific [place], UA 1 010     pH, UA 6 0     Leukocytes, UA Large (A)     Nitrite, UA Positive (A)     Protein, UA Negative mg/dl      Glucose, UA Negative mg/dl      Ketones, UA Trace (A) mg/dl      Urobilinogen, UA 0 2 E U /dl      Bilirubin, UA Negative     Blood, UA Small (A)    Lactic acid x2 [[id number]]  (Normal) Collected:  01/10/18 2041    Lab Status:  Final result Specimen:  Blood from Arm, Left Updated:  01/10/18 2110     LACTIC ACID 0 8 mmol/L     Narrative:         Result may be elevated if tourniquet was used during collection      Comprehensive metabolic panel [[id number]]  (Abnormal) Collected:  01/10/18 2041    Lab Status:  Final result Specimen:  Blood from Arm, Left Updated:  01/10/18 2108     Sodium 134 (L) mmol/L      Potassium 3 6 mmol/L      Chloride 101 mmol/L      CO2 25 mmol/L      Anion Gap 8 mmol/L      BUN 11 mg/dL      Creatinine 1 16 mg/dL      Glucose 103 mg/dL      Calcium 9 4 mg/dL      AST 14 U/L      ALT 15 U/L      Alkaline Phosphatase 74 U/L      Total Protein 7 3 g/dL      Albumin 3 1 (L) g/dL      Total Bilirubin 0 60 mg/dL      eGFR 58 ml/min/1 73sq m     Narrative:         National Kidney Disease Education Program recommendations are as follows:  GFR calculation is accurate only with a steady state creatinine  Chronic Kidney disease less than 60 ml/min/1 73 sq  meters  Kidney failure less than 15 ml/min/1 73 sq  meters  Magnesium [[id number]]  (Normal) Collected:  01/10/18 2041    Lab Status:  Final result Specimen:  Blood from Arm, Left Updated:  01/10/18 2108     Magnesium 2 2 mg/dL     Blood culture #1 [[id number]] Collected:  01/10/18 2057    Lab Status: In process Specimen:  Blood from Arm, Left Updated:  01/10/18 2106    Protime-INR [[id number]]  (Normal) Collected:  01/10/18 2041    Lab Status:  Final result Specimen:  Blood from Arm, Left Updated:  01/10/18 2102     Protime 13 6 seconds      INR 1 01    APTT [[id number]]  (Normal) Collected:  01/10/18 2041    Lab Status:  Final result Specimen:  Blood from Arm, Left Updated:  01/10/18 2102     PTT 30 seconds     Narrative: Therapeutic Heparin Range = 60-90 seconds    Blood culture #2 [[id number]] Collected:  01/10/18 2041    Lab Status: In process Specimen:  Blood from Arm, Left Updated:  01/10/18 2047    Lactic acid x2 [[id number]]     Lab Status:  No result Specimen:  Blood                  CT abdomen pelvis with contrast   Final Result by Xander Hernandez MD (01/10 2145)         1  Status post cystectomy with ileoconduit  There is mild right hydroureter and questionable mild right hydronephrosis possibly from stricturing at the anastomosis  2  Mild increased density along the right pelvic rectus abdominis muscle could relate to infectious/inflammatory etiology although no definite well-defined fluid collection is identified  3  Prominent fluid-filled jejunal loops could relate to enteritis if clinically suspected  Workstation performed: [id number]                    Procedures  ECG 12 Lead Documentation  Date/Time: 1/10/2018 10:39 PM  Performed by: Leidy Olivarez  Authorized by: Leidy Olivarez     Indications / Diagnosis:  Sepsis  ECG reviewed by me, the ED Provider: yes    Patient location:  ED  Previous ECG:     Previous ECG:  Compared to current    Comparison ECG info:  9/1/2017    Similarity:  No change  Interpretation:     Interpretation: abnormal    Rate:     ECG rate:  87    ECG rate assessment: normal    Rhythm:     Rhythm: sinus rhythm    Ectopy:     Ectopy: PVCs      PVCs:  Frequent  QRS:     QRS axis:  Normal    QRS intervals:  Normal  Conduction:     Conduction: normal    ST segments:     ST segments:  Normal  T waves:     T waves: inverted      Inverted:  II, III, V3, V4, V5 and V6  Q waves:     Q waves:  V5, V6, II and III           Phone Contacts  ED Phone Contact    ED Course  ED Course                          Initial Sepsis Screening     [address] Name 01/10/18 5688                Is the patient's history suggestive of a new or worsening infection? (!)  Yes (Proceed)  -AR        Suspected source of infection urinary tract infection  -AR        Are two or more of the following signs & symptoms of infection both present and new to the patient?         Indicate SIRS criteria Hyperthemia > 38 3C (100 9F); Tachycardia > 90 bpm  -AR        If the answer is yes to both questions, suspicion of sepsis is present          If severe sepsis is present AND tissue hypoperfusion perists in the hour after fluid resuscitation or lactate > 4, the patient meets criteria for SEPTIC SHOCK          Are any of the following organ dysfunction criteria present within 6 hours of suspected infection and SIRS criteria that are NOT considered to be chronic conditions?           Organ dysfunction          Date of presentation of severe sepsis 01/10/18  -AR        Time of presentation of severe sepsis 2015  -AR        Tissue hypoperfusion persists in the hour after crystalloid fluid administration, evidenced, by either:          Was hypotension present within one hour of the conclusion of crystalloid fluid administration?         Date of presentation of septic shock          Time of presentation of septic shock            User Key  (r) = Recorded By, (t) = Taken By, (c) = Cosigned By    [address] Name Provider Ta North MD Physician                  MDM  Number of Diagnoses or Management Options  Complicated UTI (urinary tract infection): new and requires workup  Sepsis, due to unspecified organism [facility]): new and requires workup  Diagnosis management comments: Patient with criteria for severe sepsis  Source appears to be urinary  Of note patient's CT scan seems indicate some sort of stricture on the right side, it may warrant further investigation if patient does not improved  Patient will be admitted to hospitalist service  Patient did have a period of hypotension  Patient responded vigorously to IV fluids  I suspect the patient was significantly dehydrated due to poor oral intake today         Amount and/or Complexity of Data Reviewed  Clinical lab tests: ordered and reviewed  Tests in the radiology section of CPT®: ordered and reviewed  Tests in the medicine section of CPT®: ordered and reviewed  Discuss the patient with other providers: yes  Independent visualization of images, tracings, or specimens: yes    Risk of Complications, Morbidity, and/or Mortality  Presenting problems: high  Diagnostic procedures: high  Management options: high    Patient Progress  Patient progress: improved    CritCare Time    Disposition  Final diagnoses:   Sepsis, due to unspecified organism [facility])   Complicated UTI (urinary tract infection)     Time reflects when diagnosis was documented in both MDM as applicable and the Disposition within this note     Time User Action Codes Description Comment    1/10/2018 10:44 PM Uday Mary Add [A41 9] Sepsis, due to unspecified organism ([facility] [address] )     1/10/2018 10:44 PM Mary Frye Add [I15 3] Complicated UTI (urinary tract infection)       ED Disposition     ED Disposition Condition Comment    Admit  Case was discussed with [facility] and the patient's admission status was agreed to be Admission Status: inpatient status to the service of Dr Russ Boast   Follow-up Information    None       Patient's Medications   Discharge Prescriptions    No medications on file     No discharge procedures on file      ED Provider  Electronically Signed by           Cheyenne Chester MD  01/10/18 0609

## 2018-01-11 NOTE — ASSESSMENT & PLAN NOTE
· Bladder/prostate/colon cancer, s/p bladder cystectomy and radical prostatectomy with ileo conduit  · Has non-healing surgical wound adjacent to urostomy without surrounding erythema, edema, warmth  · CT scan report [id number] with the Urology as an outpatient  · No definite abscess on the CT scan    Monitor clinically

## 2018-01-11 NOTE — ASSESSMENT & PLAN NOTE
· Bladder/prostate/colon cancer, s/p bladder cystectomy and radical prostatectomy with ileo conduit  · Has non-healing surgical wound adjacent to urostomy without surrounding erythema, edema, warmth  · CT Ab/Pelvis- Status post cystectomy with ileoconduit  There is mild right hydroureter and questionable mild right hydronephrosis possibly from stricturing at the anastomosis  Mild increased density along the right pelvic rectus abdominis muscle could relate to infectious/inflammatory etiology although no definite well-defined fluid collection is identified  Prominent fluid-filled jejunal loops could relate to enteritis if clinically suspected  · Follows with Dr Jannet Jaffe as outpatient

## 2018-01-11 NOTE — ED NOTES
Patients blood pressure read from the monitor 83/48  RN Arben Garcia was made aware  Asked to re-check BP  BP went up to 95/53  RN asked to please check blood pressure again in a few minutes  Patient is not complaining of any symptoms          Paul Quiros  01/11/18 5513

## 2018-01-11 NOTE — H&P
H&P- Shaka Li 1934, 80 y o  male MRN: [id number]    Unit/Bed#: ED 11 Encounter: [id number]    Primary Care Provider: Marilia Munoz MD   Date and time admitted to hospital: 1/10/2018  7:10 PM    UTI (urinary tract infection)   Assessment & Plan    · Presented from [facility] with fever  · TMax 100 9  · Leukocytosis of 11 24   · No lactic acidosis  · UA- Positive nitrite, No RBC, 20-30 WBC, Moderate bacteria  · IV Rocephin  · Tylenol PRN fever  · Blood cultures, urine culture pending  Hypertension   Assessment & Plan    · /53  · Not currently on any anti-hypertensive's        Hyperlipidemia   Assessment & Plan    · Not currently on statin  Hypothyroidism   Assessment & Plan    · Continue synthroid  Bladder cancer [facility])   Assessment & Plan    · Bladder/prostate/colon cancer, s/p bladder cystectomy and radical prostatectomy with ileo conduit  · Has non-healing surgical wound adjacent to urostomy without surrounding erythema, edema, warmth  · CT Ab/Pelvis- Status post cystectomy with ileoconduit  There is mild right hydroureter and questionable mild right hydronephrosis possibly from stricturing at the anastomosis  Mild increased density along the right pelvic rectus abdominis muscle could relate to infectious/inflammatory etiology although no definite well-defined fluid collection is identified  Prominent fluid-filled jejunal loops could relate to enteritis if clinically suspected  · Follows with Dr Pete Lin as outpatient  Vascular dementia   Assessment & Plan    · Stable  VTE Prophylaxis: Heparin  / sequential compression device   Code Status: Full Code  POLST: POLST form is not discussed and not completed at this time  Discussion with family: Discussed with wife at bedside  Anticipated Length of Stay:  Patient will be admitted on an Inpatient basis with an anticipated length of stay of  Greater than 2 midnights     Justification for JUSTIN SINGLETON Stay: UTI  Total Time for Visit, including Counseling / Coordination of Care: 45 minutes  Greater than 50% of this total time spent on direct patient counseling and coordination of care  Chief Complaint:   Fever  History of Present Illness:    Aren Fisher is a 80 y o  male who presents from [facility] with fever  History is provided by wife, at bedside, as patient has dementia and is unable to provide history  Wife reports that she was called from [facility] making her aware that they were sending the patient to the hospital because he had a fever and was complaining of abdominal pain  She does not recall what his temperature was at [facility]  Additionally she notes that he has not had much draining from his chronic surgical wound today, which is unusual  He other wise has not had any complaints      Review of Systems:    Review of Systems   Unable to perform ROS: Dementia       Past Medical and Surgical History:     Past Medical History:   Diagnosis Date    [facility]     Bursitis     shoulders and back    Cancer ([facility]     bladder, prostate    Coronary artery disease     Hard of hearing     Hyperlipidemia     Hypertension     Hypothyroidism 1/10/2018    Memory loss     Myocardial infarction 1987    Prostate CA ([facility] )     UTI (urinary tract infection) 1/10/2018    Vascular dementia        Past Surgical History:   Procedure Laterality Date    CATARACT EXTRACTION      COLONOSCOPY      COLOSTOMY      CYSTECTOMY W/ URETEROILEAL CONDUIT      NH LAP,SURG,COLECTOMY,W/REMVL TERM ILEUM Right 7/21/2017    Procedure: LAPAROSCOPIC ASSISTED COLON RESECITON, RIGHT COLON RESECTION, REPAIR INCISIONAL HERNIA;  Surgeon: Cheyenne Godfrey MD;  Location: [facility];  Service: General    [address] [place] [address] N/A 7/21/2017    Procedure: OPEN REPAIR PARA-STOMAL HERNIA;  Surgeon: Cheyenne Godfrey MD;  Location: [facility];  Service: General       Meds/Allergies:    Prior to Admission medications    Medication Sig Start Date End Date Taking? Authorizing Provider   acetaminophen (TYLENOL) 325 mg tablet Take 650 mg by mouth every 6 (six) hours as needed for mild pain    Historical Provider, MD   ALPRAZolam (XANAX) 0 25 mg tablet Take 0 25 mg by mouth 2 (two) times a day as needed for anxiety    Historical Provider, MD   cholecalciferol (VITAMIN D3) 1,000 units tablet Take 1,000 Units by mouth daily    Historical Provider, MD   cyanocobalamin (VITAMIN B-12) 1,000 mcg tablet Take by mouth daily    Historical Provider, MD   ferrous sulfate 325 (65 Fe) mg tablet Take 325 mg by mouth daily with breakfast    Historical Provider, MD   levothyroxine 25 mcg tablet Take 25 mcg by mouth daily    Historical Provider, MD   Multiple Vitamin (MULTIVITAMIN) tablet Take 1 tablet by mouth daily    Historical Provider, MD     I have reveiwed home medications using records provided by [facility]  Allergies: No Known Allergies    Social History:     Marital Status: /Civil Union   Occupation: None  Patient Pre-hospital Living Situation: [facility]  Patient Pre-hospital Level of Mobility: Limited  Patient Pre-hospital Diet Restrictions: None  Substance Use History:   History   Alcohol Use No     History   Smoking Status    Former Smoker    Quit date: 1987   Smokeless Tobacco    Former User     History   Drug Use No       Family History:    non-contributory    Physical Exam:     Vitals:   Blood Pressure: 107/53 (01/10/18 2201)  Pulse: 81 (01/10/18 2201)  Temperature: (!) 100 9 °F (38 3 °C) (01/10/18 2039)  Temp Source: Rectal (01/10/18 2039)  Respirations: 18 (01/10/18 2201)  SpO2: 96 % (01/10/18 2201)    Physical Exam   Constitutional: He appears well-developed and well-nourished  He is cooperative  He does not appear ill  No distress  HENT:   Head: Normocephalic and atraumatic  Eyes: Conjunctivae, EOM and lids are normal  Pupils are equal, round, and reactive to light     Cardiovascular: Normal rate, regular rhythm, normal heart sounds and normal pulses  No murmur heard  Pulmonary/Chest: Effort normal and breath sounds normal  He has no wheezes  He has no rhonchi  He has no rales  Abdominal: Soft  Normal appearance and bowel sounds are normal  There is no tenderness  Urostomy in place with clear yellow urine  Adjacent non-healing wound without surrounding erythema, edema, warmth  Musculoskeletal: Normal range of motion  Neurological: He is alert  He has normal strength  He is disoriented  Skin: Skin is warm, dry and intact  He is not diaphoretic  Psychiatric: He has a normal mood and affect  His speech is normal and behavior is normal  Cognition and memory are impaired  Vitals reviewed  Additional Data:     Lab Results: I have personally reviewed pertinent reports  Results from last 7 days  Lab Units 01/10/18  2041   WBC Thousand/uL 11 24*   HEMOGLOBIN g/dL 10 1*   HEMATOCRIT % 32 7*   PLATELETS Thousands/uL 282   LYMPHO PCT % 1*   MONO PCT MAN % 6   EOSINO PCT MANUAL % 0       Results from last 7 days  Lab Units 01/10/18  2041   SODIUM mmol/L 134*   POTASSIUM mmol/L 3 6   CHLORIDE mmol/L 101   CO2 mmol/L 25   BUN mg/dL 11   CREATININE mg/dL 1 16   CALCIUM mg/dL 9 4   TOTAL PROTEIN g/dL 7 3   BILIRUBIN TOTAL mg/dL 0 60   ALK PHOS U/L 74   ALT U/L 15   AST U/L 14   GLUCOSE RANDOM mg/dL 103       Results from last 7 days  Lab Units 01/10/18  2041   INR  1 01       Imaging: I have personally reviewed pertinent reports  CT abdomen pelvis with contrast   Final Result by Al Coker MD (01/10 2145)         1  Status post cystectomy with ileoconduit  There is mild right hydroureter and questionable mild right hydronephrosis possibly from stricturing at the anastomosis  2  Mild increased density along the right pelvic rectus abdominis muscle could relate to infectious/inflammatory etiology although no definite well-defined fluid collection is identified     3  Prominent fluid-filled jejunal loops could relate to enteritis if clinically suspected  Workstation performed: [id number]             EKG, Pathology, and Other Studies Reviewed on Admission:   · EKG: NSR  Frequent PVCs  Inverted T waves in II, III, V3-V6  Q waves in II, III, V5-V6  Allscripts / Epic Records Reviewed: Yes     ** Please Note: This note has been constructed using a voice recognition system   **

## 2018-01-11 NOTE — ED NOTES
Pt sleeping,arousable, does not want to eat @thias time; meal tray @bedside     Sona Pate, ISIDORO  01/11/18 4400

## 2018-01-11 NOTE — ED NOTES
Patient resting, intermittently yelling help  Patient believes he fell on the floor  Patient in safely lying in the bed        Ezequiel Kline  01/11/18 8667

## 2018-01-11 NOTE — ED NOTES
Pt laying on stretcher with TV on and speaking to himself  Will continue to monitor        Candelario Sorensen  01/11/18 1503

## 2018-01-11 NOTE — CASE MANAGEMENT
Initial Clinical Review    Admission: Date/Time/Statement: 1/10/18 @ 2244     Orders Placed This Encounter   Procedures    Inpatient Admission (expected length of stay for this patient is greater than two midnights)     Standing Status:   Standing     Number of Occurrences:   1     Order Specific Question:   Admitting Physician     Answer:   Elida Diaz [[id number]]     Order Specific Question:   Level of Care     Answer:   Med Surg [[id number]]     Order Specific Question:   Estimated length of stay     Answer:   More than 2 Midnights     Order Specific Question:   Certification     Answer:   I certify that inpatient services are medically necessary for this patient for a duration of greater than two midnights  See H&P and MD Progress Notes for additional information about the patient's course of treatment  ED: Date/Time/Mode of Arrival:   ED Arrival Information     Expected Arrival Acuity Means of Arrival Escorted By Service Admission Type    - 1/10/2018 19:10 [facility] within [facility] General Medicine Urgent    Arrival Complaint    FEVER          Chief Complaint:   Chief Complaint   Patient presents with    Fever - 75 years or older     Pt was sent by facility because of fever and belly pain  Pt has history of dementia  Pt has a new colostomy and the facility was worried about sepsis  Pt is scheduled for a CT scan on Monday for increased drainage from a recent colon surgery  History of Illness:   80-year-old male sent from a local nursing home for fever and c/o abd pain  Patient does have a complicated medical history of having had colon cancer with significant resection resulting in an ileal conduit for urine as well as a chronic ostomy for an intra-abdominal wound    Wife noted there has been no drainage from this wound today which is highly unusual   Max temp prior to arrival:  102  Temp source:  Oral    ED Vital Signs:   ED Triage Vitals [01/10/18 1919]   Temperature Pulse Respirations Blood Pressure SpO2   99 9 °F (37 7 °C) 104 18 95/60 96 %      Temp Source Heart Rate Source Patient Position - Orthostatic VS BP Location FiO2 (%)   Axillary Monitor Sitting Right arm --      Pain Score       No Pain        Wt Readings from Last 1 Encounters:   01/11/18 63 5 kg (140 lb)     Abnormal Labs/Diagnostic Test Results:   Na  134  >  140  K  3 6  >  3 3  Lactic acid  0 8 >  0 7  WBC  11 24  >  8 29  Hgb  10 1  >  8 8    Ua: Pos nitrite;  20 - 30 wbc; Mod bacteria    ED Treatment:   Medication Administration from 01/10/2018 1910 to 01/11/2018 7284       Date/Time Order Dose Route Action Action by Comments                01/10/2018 2047 sodium chloride 0 9 % bolus 1,000 mL 1,000 mL Intravenous New Bag Shonda Barrow RN                 01/10/2018 2133 sodium chloride 0 9 % bolus 1,000 mL 1,000 mL Intravenous New Bag Shonda Barrow RN                 01/10/2018 2133 cefepime (MAXIPIME) 2 g/50 mL dextrose IVPB 2,000 mg Intravenous New Bag Shonda Barrow RN                 01/11/2018 0715 levothyroxine tablet 25 mcg 25 mcg Oral Given Shonda Barrow RN      01/11/2018 0717 heparin (porcine) subcutaneous injection 5,000 Units 5,000 Units Subcutaneous Given Shonda Barrow RN           Past Medical/Surgical History:    Active Ambulatory Problems     Diagnosis Date Noted    Hypertension 07/21/2017    Cancer ([facility] ) 07/21/2017    Bladder cancer ([place] [address] ) 07/21/2017    Hyperlipidemia 07/21/2017    Vascular dementia 07/21/2017    SVT (supraventricular tachycardia) ([facility] ) 07/21/2017    Colonic mass 07/22/2017    Abdominal abscess ([facility] ) 09/03/2017     Resolved Ambulatory Problems     Diagnosis Date Noted    No Resolved Ambulatory Problems     Past Medical History:   Diagnosis Date    Bladder cancer ([facility] )     Bursitis     Cancer ([place] [address] )     Coronary artery disease     Hard of hearing     Hyperlipidemia     Hypertension     Hypothyroidism 1/10/2018    Memory loss  Myocardial infarction 1987    Prostate CA ([facility] )     UTI (urinary tract infection) 1/10/2018    Vascular dementia        Admitting Diagnosis: Fever [R50 9]    Age/Sex: 80 y o  male    Assessment/Plan:   UTI (urinary tract infection)   Assessment & Plan     · Presented from [facility] with fever  · TMax 100 9  · Leukocytosis of 11 24   · No lactic acidosis  · UA- Positive nitrite, No RBC, 20-30 WBC, Moderate bacteria  · IV Rocephin  · Tylenol PRN fever  · Blood cultures, urine culture pending        Hypertension   Assessment & Plan     · /53  · Not currently on any anti-hypertensive's       Hyperlipidemia   Assessment & Plan     · Not currently on statin        Hypothyroidism   Assessment & Plan     · Continue synthroid        Bladder cancer [facility])   Assessment & Plan     · Bladder/prostate/colon cancer, s/p bladder cystectomy and radical prostatectomy with ileo conduit  · Has non-healing surgical wound adjacent to urostomy without surrounding erythema, edema, warmth  · CT Ab/Pelvis- Status post cystectomy with ileoconduit  There is mild right hydroureter and questionable mild right hydronephrosis possibly from stricturing at the anastomosis  Mild increased density along the right pelvic rectus abdominis muscle could relate to infectious/inflammatory etiology although no definite well-defined fluid collection is identified  Prominent fluid-filled jejunal loops could relate to enteritis if clinically suspected  · Follows with Dr Chuck Pallas as outpatient        Vascular dementia   Assessment & Plan     · Stable              VTE Prophylaxis: Heparin  / sequential compression device     Anticipated Length of Stay:  Patient will be admitted on an Inpatient basis with an anticipated length of stay of  Greater than 2 midnights  Justification for Hospital Stay: UTI        Admission Orders:  Admit medical  Reg diet  icentive spirometry  PT/Ot  Continual observation (visual) initiated @ 0215  (safety) Scheduled Meds:   cefTRIAXone 1,000 mg Intravenous Q24H   cholecalciferol 1,000 Units Oral Daily   cyanocobalamin 1,000 mcg Oral Daily   ferrous sulfate 325 mg Oral Daily With Breakfast   heparin (porcine) 5,000 Units Subcutaneous Q8H [address]   levothyroxine 25 mcg Oral Early Morning         1/11/2018  Tmax  100 9 rectal  HR  70 - 80's  bp  107/70    83/48    86/53  RR 18

## 2018-01-11 NOTE — ASSESSMENT & PLAN NOTE
· Present at the time of admission as documented by leukocytosis fever and tachycardia  ·     Appears to be improving  · Likely secondary to urinary tract infection

## 2018-01-11 NOTE — ASSESSMENT & PLAN NOTE
· Presented from [facility] with fever  · TMax 100 9  · Leukocytosis of 11 24   · No lactic acidosis  · UA- Positive nitrite, No RBC, 20-30 WBC, Moderate bacteria  · IV Rocephin  · Tylenol PRN fever  · Blood cultures, urine culture pending

## 2018-01-11 NOTE — ASSESSMENT & PLAN NOTE
· Presented from [facility] with fever  · TMax 100 9    · Leukocytosis of 11 24 at presentation and currently normalized  · Urine culture is [id number] on the cultures and continue with the broad-spectrum antibiotics

## 2018-01-12 LAB
BACTERIA BLD CULT: ABNORMAL
GRAM STN SPEC: ABNORMAL
MRSA NOSE QL CULT: NORMAL

## 2018-01-12 RX ADMIN — HEPARIN SODIUM 5000 UNITS: 5000 INJECTION, SOLUTION INTRAVENOUS; SUBCUTANEOUS at 14:59

## 2018-01-12 RX ADMIN — CEFTRIAXONE SODIUM 1000 MG: 10 INJECTION, POWDER, FOR SOLUTION INTRAVENOUS at 11:13

## 2018-01-12 RX ADMIN — ALPRAZOLAM 0.25 MG: 0.25 TABLET ORAL at 13:27

## 2018-01-12 RX ADMIN — CHOLECALCIFEROL TAB 25 MCG (1000 UNIT) 1000 UNITS: 25 TAB at 11:14

## 2018-01-12 RX ADMIN — FERROUS SULFATE TAB 325 MG (65 MG ELEMENTAL FE) 325 MG: 325 (65 FE) TAB at 11:14

## 2018-01-12 RX ADMIN — HEPARIN SODIUM 5000 UNITS: 5000 INJECTION, SOLUTION INTRAVENOUS; SUBCUTANEOUS at 05:59

## 2018-01-12 RX ADMIN — LEVOTHYROXINE SODIUM 25 MCG: 25 TABLET ORAL at 05:59

## 2018-01-12 RX ADMIN — CYANOCOBALAMIN TAB 500 MCG 1000 MCG: 500 TAB at 11:14

## 2018-01-12 RX ADMIN — ALPRAZOLAM 0.25 MG: 0.25 TABLET ORAL at 21:38

## 2018-01-12 NOTE — PROGRESS NOTES
[facility] Internal Medicine Progress Note  Patient: Hima Munoz 80 y o  male   MRN: [id number]  PCP: Bonny Cortez MD  Unit/Bed#: -[facility] Encounter: [id number]  Date Of Visit: 01/12/18    Assessment:    Principal Problem:    UTI (urinary tract infection)  Active Problems:    Hypertension    Bladder cancer ([facility] )    Hypothyroidism    Sepsis ([facility] )    Leukocytosis    Hypokalemia    Hyperlipidemia    Vascular dementia      Plan:  1  Urinary tract infection-patient with ileal conduit and urostomy  Urine culture still pending  Continue with the current antibiotics and follow up on the cultures  2  Dementia with behavioral disturbances-likely exacerbated by urinary tract infections fraction along with on family of surroundings  Currently patient is on one-to-one for behavior  Monitor for now  3  Hypertension-blood pressure acceptable continue with the current medication   4  Anemia-recheck H and H in the morning  5  Bladder cancer status post cystoprostatectomy with ileal conduit-follow up with Urology  Creatinine remained stable  6   Hypothyroidism-continue with Synthroid  7  Sepsis present at the time of admission secondary to the urinary tract infection  Currently resolved      VTE Pharmacologic Prophylaxis:   Pharmacologic: Heparin  Mechanical VTE Prophylaxis in Place: No    Patient Centered Rounds: I have performed bedside rounds with nursing staff today  Discussions with Specialists or Other Care Team Provider:     Education and Discussions with Family / Patient:  Called wife, left voicemail     Time Spent for Care: 30 minutes  More than 50% of total time spent on counseling and coordination of care as described above      Current Length of Stay: 2 day(s)    Current Patient Status: Inpatient   Certification Statement: The patient will continue to require additional inpatient hospital stay due to Pending urine culture    Discharge Plan / Estimated Discharge Date:     Code Status: Level 1 - Full Code      Subjective:   Patient seen and examined  Patient is very agitated and trying to get out of bed  No events overnight other than the agitation    Objective:     Vitals:   Temp (24hrs), Av 8 °F (37 1 °C), Min:98 °F (36 7 °C), Max:99 5 °F (37 5 °C)    HR:  [] 103  Resp:  [16-18] 18  BP: ()/(50-91) 127/91  SpO2:  [94 %-98 %] 95 %  Body mass index is 22 6 kg/m²  Input and Output Summary (last 24 hours): Intake/Output Summary (Last 24 hours) at 18 1246  Last data filed at 18 0874   Gross per 24 hour   Intake              180 ml   Output              625 ml   Net             -445 ml       Physical Exam:     Physical Exam   Constitutional: He appears well-developed and well-nourished  HENT:   Head: Normocephalic and atraumatic  Eyes: EOM are normal  Pupils are equal, round, and reactive to light  Neck: Normal range of motion  Neck supple  Cardiovascular: Normal rate and regular rhythm  No murmur heard  Pulmonary/Chest: Effort normal and breath sounds normal  No respiratory distress  Abdominal: Soft  Bowel sounds are normal  He exhibits no distension  Musculoskeletal: Normal range of motion  Neurological: He is alert  Skin: Skin is warm and dry           Additional Data:     Labs:      Results from last 7 days  Lab Units 18  0624 01/10/18  2041   WBC Thousand/uL 8 59 11 24*   HEMOGLOBIN g/dL 8 8* 10 1*   HEMATOCRIT % 29 7* 32 7*   PLATELETS Thousands/uL 217 282   LYMPHO PCT %  --  1*   MONO PCT MAN %  --  6   EOSINO PCT MANUAL %  --  0       Results from last 7 days  Lab Units 18  0624 01/10/18  2041   SODIUM mmol/L 140 134*   POTASSIUM mmol/L 3 3* 3 6   CHLORIDE mmol/L 108 101   CO2 mmol/L 23 25   BUN mg/dL 8 11   CREATININE mg/dL 0 98 1 16   CALCIUM mg/dL 8 2* 9 4   TOTAL PROTEIN g/dL  --  7 3   BILIRUBIN TOTAL mg/dL  --  0 60   ALK PHOS U/L  --  74   ALT U/L  --  15   AST U/L  --  14   GLUCOSE RANDOM mg/dL 87 103       Results from last  days  Lab Units 01/10/18  2041   INR  1 01       * I Have Reviewed All Lab Data Listed Above  * Additional Pertinent Lab Tests Reviewed: [address] Admission Reviewed    Imaging:    Imaging Reports Reviewed Today Include:   Imaging Personally Reviewed by Myself Includes:     Recent Cultures (last 7 days):       Results from last 7 days  Lab Units 01/10/18  2110 01/10/18  2057 01/10/18  2041   BLOOD CULTURE   --   --  No Growth at 24 hrs  GRAM STAIN RESULT   --  Gram negative rods  --    URINE CULTURE  >100,000 cfu/ml Gram Negative Quinn Enteric Like*  --   --        Last 24 Hours Medication List:     cefTRIAXone 1,000 mg Intravenous Q24H   cholecalciferol 1,000 Units Oral Daily   cyanocobalamin 1,000 mcg Oral Daily   ferrous sulfate 325 mg Oral Daily With Breakfast   heparin (porcine) 5,000 Units Subcutaneous Q8H [facility]   levothyroxine 25 mcg Oral Early Morning        Today, Patient Was Seen By: Dimitri Alcala MD    ** Please Note: This note has been constructed using a voice recognition system   **

## 2018-01-12 NOTE — PLAN OF CARE
Problem: Potential for Falls  Goal: Patient will remain free of falls  INTERVENTIONS:  - Assess patient frequently for physical needs  -  Identify cognitive and physical deficits and behaviors that affect risk of falls    -  [place] fall precautions as indicated by assessment   - Educate patient/family on patient safety including physical limitations  - Instruct patient to call for assistance with activity based on assessment  - Modify environment to reduce risk of injury  - Consider OT/PT consult to assist with strengthening/mobility   Outcome: Progressing      Problem: Prexisting or High Potential for Compromised Skin Integrity  Goal: Skin integrity is maintained or improved  INTERVENTIONS:  - Identify patients at risk for skin breakdown  - Assess and monitor skin integrity  - Assess and monitor nutrition and hydration status  - Monitor labs (i e  albumin)  - Assess for incontinence   - Turn and reposition patient  - Assist with mobility/ambulation  - Relieve pressure over bony prominences  - Avoid friction and shearing  - Provide appropriate hygiene as needed including keeping skin clean and dry  - Evaluate need for skin moisturizer/barrier cream  - Collaborate with interdisciplinary team (i e  Nutrition, Rehabilitation, etc )   - Patient/family teaching   Outcome: Progressing

## 2018-01-13 VITALS
SYSTOLIC BLOOD PRESSURE: 124 MMHG | HEIGHT: 66 IN | OXYGEN SATURATION: 91 % | HEART RATE: 85 BPM | DIASTOLIC BLOOD PRESSURE: 72 MMHG | RESPIRATION RATE: 20 BRPM | TEMPERATURE: 97.6 F | WEIGHT: 140 LBS | BODY MASS INDEX: 22.5 KG/M2

## 2018-01-13 VITALS
WEIGHT: 155 LBS | BODY MASS INDEX: 24.91 KG/M2 | HEIGHT: 66 IN | SYSTOLIC BLOOD PRESSURE: 138 MMHG | HEART RATE: 64 BPM | DIASTOLIC BLOOD PRESSURE: 76 MMHG

## 2018-01-13 LAB
ANION GAP SERPL CALCULATED.3IONS-SCNC: 7 MMOL/L (ref 4–13)
BASOPHILS # BLD AUTO: 0.01 THOUSANDS/ΜL (ref 0–0.1)
BASOPHILS NFR BLD AUTO: 0 % (ref 0–1)
BUN SERPL-MCNC: 7 MG/DL (ref 5–25)
CALCIUM SERPL-MCNC: 8.4 MG/DL (ref 8.3–10.1)
CHLORIDE SERPL-SCNC: 105 MMOL/L (ref 100–108)
CO2 SERPL-SCNC: 25 MMOL/L (ref 21–32)
CREAT SERPL-MCNC: 0.85 MG/DL (ref 0.6–1.3)
EOSINOPHIL # BLD AUTO: 0.09 THOUSAND/ΜL (ref 0–0.61)
EOSINOPHIL NFR BLD AUTO: 2 % (ref 0–6)
ERYTHROCYTE [DISTWIDTH] IN BLOOD BY AUTOMATED COUNT: 15.1 % (ref 11.6–15.1)
GFR SERPL CREATININE-BSD FRML MDRD: 81 ML/MIN/1.73SQ M
GLUCOSE SERPL-MCNC: 85 MG/DL (ref 65–140)
HCT VFR BLD AUTO: 32.2 % (ref 36.5–49.3)
HGB BLD-MCNC: 9.5 G/DL (ref 12–17)
LYMPHOCYTES # BLD AUTO: 0.85 THOUSANDS/ΜL (ref 0.6–4.47)
LYMPHOCYTES NFR BLD AUTO: 15 % (ref 14–44)
MAGNESIUM SERPL-MCNC: 2.1 MG/DL (ref 1.6–2.6)
MCH RBC QN AUTO: 27.1 PG (ref 26.8–34.3)
MCHC RBC AUTO-ENTMCNC: 29.5 G/DL (ref 31.4–37.4)
MCV RBC AUTO: 92 FL (ref 82–98)
MONOCYTES # BLD AUTO: 0.55 THOUSAND/ΜL (ref 0.17–1.22)
MONOCYTES NFR BLD AUTO: 10 % (ref 4–12)
NEUTROPHILS # BLD AUTO: 4.09 THOUSANDS/ΜL (ref 1.85–7.62)
NEUTS SEG NFR BLD AUTO: 73 % (ref 43–75)
PLATELET # BLD AUTO: 208 THOUSANDS/UL (ref 149–390)
PMV BLD AUTO: 10.3 FL (ref 8.9–12.7)
POTASSIUM SERPL-SCNC: 3.3 MMOL/L (ref 3.5–5.3)
RBC # BLD AUTO: 3.5 MILLION/UL (ref 3.88–5.62)
SODIUM SERPL-SCNC: 137 MMOL/L (ref 136–145)
WBC # BLD AUTO: 5.59 THOUSAND/UL (ref 4.31–10.16)

## 2018-01-13 PROCEDURE — 83735 ASSAY OF MAGNESIUM: CPT | Performed by: FAMILY MEDICINE

## 2018-01-13 PROCEDURE — 80048 BASIC METABOLIC PNL TOTAL CA: CPT | Performed by: FAMILY MEDICINE

## 2018-01-13 PROCEDURE — 85025 COMPLETE CBC W/AUTO DIFF WBC: CPT | Performed by: FAMILY MEDICINE

## 2018-01-13 RX ORDER — POTASSIUM CHLORIDE 20 MEQ/1
40 TABLET, EXTENDED RELEASE ORAL ONCE
Status: COMPLETED | OUTPATIENT
Start: 2018-01-13 | End: 2018-01-13

## 2018-01-13 RX ORDER — SULFAMETHOXAZOLE AND TRIMETHOPRIM 800; 160 MG/1; MG/1
1 TABLET ORAL EVERY 12 HOURS SCHEDULED
Qty: 14 TABLET | Refills: 0 | Status: SHIPPED | OUTPATIENT
Start: 2018-01-13 | End: 2018-01-20

## 2018-01-13 RX ORDER — SULFAMETHOXAZOLE AND TRIMETHOPRIM 800; 160 MG/1; MG/1
1 TABLET ORAL EVERY 12 HOURS SCHEDULED
Status: DISCONTINUED | OUTPATIENT
Start: 2018-01-13 | End: 2018-01-13 | Stop reason: HOSPADM

## 2018-01-13 RX ADMIN — HEPARIN SODIUM 5000 UNITS: 5000 INJECTION, SOLUTION INTRAVENOUS; SUBCUTANEOUS at 05:21

## 2018-01-13 RX ADMIN — ALPRAZOLAM 0.25 MG: 0.25 TABLET ORAL at 16:23

## 2018-01-13 RX ADMIN — SULFAMETHOXAZOLE AND TRIMETHOPRIM 1 TABLET: 800; 160 TABLET ORAL at 14:41

## 2018-01-13 RX ADMIN — CEFTRIAXONE SODIUM 1000 MG: 10 INJECTION, POWDER, FOR SOLUTION INTRAVENOUS at 09:01

## 2018-01-13 RX ADMIN — POTASSIUM CHLORIDE 40 MEQ: 1500 TABLET, EXTENDED RELEASE ORAL at 14:41

## 2018-01-13 RX ADMIN — CHOLECALCIFEROL TAB 25 MCG (1000 UNIT) 1000 UNITS: 25 TAB at 09:02

## 2018-01-13 RX ADMIN — FERROUS SULFATE TAB 325 MG (65 MG ELEMENTAL FE) 325 MG: 325 (65 FE) TAB at 09:01

## 2018-01-13 RX ADMIN — LEVOTHYROXINE SODIUM 25 MCG: 25 TABLET ORAL at 05:21

## 2018-01-13 RX ADMIN — ALPRAZOLAM 0.25 MG: 0.25 TABLET ORAL at 05:21

## 2018-01-13 RX ADMIN — POTASSIUM CHLORIDE 40 MEQ: 1500 TABLET, EXTENDED RELEASE ORAL at 16:23

## 2018-01-13 RX ADMIN — HEPARIN SODIUM 5000 UNITS: 5000 INJECTION, SOLUTION INTRAVENOUS; SUBCUTANEOUS at 13:58

## 2018-01-13 RX ADMIN — CYANOCOBALAMIN TAB 500 MCG 1000 MCG: 500 TAB at 09:01

## 2018-01-13 NOTE — ASSESSMENT & PLAN NOTE
· Bladder/prostate/colon cancer, s/p bladder cystectomy and radical prostatectomy with ileo conduit  · Has non-healing surgical wound adjacent to urostomy without surrounding erythema, edema, warmth  · CT scan report [id number] with the Urology as an outpatient  · No definite abscess on the CT scan    Monitor clinically

## 2018-01-13 NOTE — ASSESSMENT & PLAN NOTE
· Presented from [facility] with fever  · TMax 100 9    · Leukocytosis of 11 24 at presentation and currently normalized  · Start on septra  ·

## 2018-01-13 NOTE — DISCHARGE SUMMARY
Discharge- Stewart Edwards 1934, 80 y o  male MRN: [id number]    Unit/Bed#: -01 Encounter: [id number]    Primary Care Provider: Edilberto Diggs MD   Date and time admitted to hospital: 1/10/2018  7:10 PM        * UTI (urinary tract infection)   Assessment & Plan    · Presented from [facility] with fever  · TMax 100 9  · Leukocytosis of 11 24 at presentation and currently normalized  · Start on septra  ·         Hypokalemia   Assessment & Plan    Was replace           Sepsis [facility])   Assessment & Plan    · Present at the time of admission as documented by leukocytosis fever and tachycardia  Improved  · Likely secondary to urinary tract infection  · Cont septra for 7 days         Hypothyroidism   Assessment & Plan    · Continue synthroid  Vascular dementia   Assessment & Plan    · Stable  Hyperlipidemia   Assessment & Plan    · Not currently on statin  Bladder cancer [facility])   Assessment & Plan    · Bladder/prostate/colon cancer, s/p bladder cystectomy and radical prostatectomy with ileo conduit  · Has non-healing surgical wound adjacent to urostomy without surrounding erythema, edema, warmth  · CT scan report [id number] with the Urology as an outpatient  · No definite abscess on the CT scan  Monitor clinically         Hypertension   Assessment & Plan    Was hypotensive at earlier, IV fluids and monitor the blood pressure          Consultations During Hospital Stay:  · none    Procedures Performed:     ·     Significant Findings / Test Results:     ·     Incidental Findings:   ·     Test Results Pending at Discharge (will require follow up):   ·      Outpatient Tests Requested:  ·     Complications:  none    Hospital Course:     Stewart Edwards is a 80 y o  male patient who originally presented to the hospital on 1/10/2018 due to  Fever , found to have UTI secondary to secondary to E  Coli which exacerbated his dementia   Currently stable, patient will be discharge on p o antibiotic    Condition at Discharge: stable     Discharge Day Visit / Exam:     * Please refer to separate progress note for these details *    Discussion with Family:       Discharge instructions/Information to patient and family:   See after visit summary for information provided to patient and family  Provisions for Follow-Up Care:  See after visit summary for information related to follow-up care and any pertinent home health orders  Disposition:     Home    For Discharges to [facility] SNF:   · Not Applicable to this Patient - Not Applicable to this Patient    Planned Readmission:     Discharge Statement:  I spent  30 minutes discharging the patient  This time was spent on the day of discharge  I had direct contact with the patient on the day of discharge  Greater than 50% of the total time was spent examining patient, answering all patient questions, arranging and discussing plan of care with patient as well as directly providing post-discharge instructions  Additional time then spent on discharge activities  Discharge Medications:  See after visit summary for reconciled discharge medications provided to patient and family  ** Please Note: This note has been constructed using a voice recognition system   **

## 2018-01-13 NOTE — SOCIAL WORK
LOS 3   Bundle pt; Not a readmission  Pt is stable for DC and is able to return to [facility]  Updated clinical has been sent to   Transportation has been arranged with [employer] as there was no other availability with [employer] or [employer]  Pt will return via BLS at 1630  Nursing staff, facility and wife have been made available  L/M to review IMM with wife  LMN completed and placed in chart binder  No further Cm needs at this time  [facility] referral has been placed for [facility] as banner was noted on chart  [facility] has been notified of DC

## 2018-01-13 NOTE — PROGRESS NOTES
Progress Note - Alicia Lebron 1934, 80 y o  male MRN: [id number]    Unit/Bed#: -01 Encounter: [id number]    Primary Care Provider: Heraclio Beaulieu MD   Date and time admitted to hospital: 1/10/2018  7:10 PM        * UTI (urinary tract infection)   Assessment & Plan    · Presented from [facility] with fever  · TMax 100 9  · Leukocytosis of 11 24 at presentation and currently normalized  · Start on septra  ·         Hypokalemia   Assessment & Plan    Replace          Leukocytosis   Assessment & Plan    improved        Sepsis ([address] )   Assessment & Plan    · Present at the time of admission as documented by leukocytosis fever and tachycardia  Improved  · Likely secondary to urinary tract infection        Hypothyroidism   Assessment & Plan    · Continue synthroid  Vascular dementia   Assessment & Plan    · Stable  Hyperlipidemia   Assessment & Plan    · Not currently on statin  Bladder cancer [facility])   Assessment & Plan    · Bladder/prostate/colon cancer, s/p bladder cystectomy and radical prostatectomy with ileo conduit  · Has non-healing surgical wound adjacent to urostomy without surrounding erythema, edema, warmth  · CT scan report [id number] with the Urology as an outpatient  · No definite abscess on the CT scan  Monitor clinically         Hypertension   Assessment & Plan    Was hypotensive at earlier, IV fluids and monitor the blood pressure          VTE Pharmacologic Prophylaxis:   Pharmacologic: Heparin  Mechanical VTE Prophylaxis in Place: Yes    Patient Centered Rounds: I have performed bedside rounds with nursing staff today  Discussions with Specialists or Other Care Team Provider:     Education and Discussions with Family / Patient: daughter     Time Spent for Care: 20 minutes  More than 50% of total time spent on counseling and coordination of care as described above      Current Length of Stay: 3 day(s)    Current Patient Status: Inpatient Certification Statement: The patient will continue to require additional inpatient hospital stay due to acute above conditions    Discharge Plan: likely tomorrow   Code Status: Level 1 - Full Code      Subjective:   Patient found in the chair without acute distress, oriented only to person     Objective:     Vitals:   Temp (24hrs), Av 2 °F (37 3 °C), Min:98 4 °F (36 9 °C), Max:100 6 °F (38 1 °C)    HR:  [74-85] 74  Resp:  [18-20] 20  BP: (107-128)/(57-75) 128/75  SpO2:  [94 %-95 %] 95 %  Body mass index is 22 6 kg/m²  Input and Output Summary (last 24 hours): Intake/Output Summary (Last 24 hours) at 18 1407  Last data filed at 18 0953   Gross per 24 hour   Intake             1340 ml   Output             1025 ml   Net              315 ml       Physical Exam:     Physical Exam   Constitutional: No distress  Neck: No JVD present  No tracheal deviation present  No thyromegaly present  Cardiovascular: Normal rate  Exam reveals no gallop and no friction rub  No murmur heard  Pulmonary/Chest: No respiratory distress  He has no wheezes  He has no rales  He exhibits no tenderness  Abdominal: He exhibits no distension and no mass  There is no tenderness  There is no rebound and no guarding  Musculoskeletal: He exhibits no edema, tenderness or deformity  Lymphadenopathy:     He has no cervical adenopathy  Neurological: He is alert  He displays normal reflexes  No cranial nerve deficit  Coordination normal    Oriented x1   Skin: Skin is warm  He is not diaphoretic  Psychiatric: He has a normal mood and affect         Additional Data:     Labs:      Results from last 7 days  Lab Units 18  0522   WBC Thousand/uL 5 59   HEMOGLOBIN g/dL 9 5*   HEMATOCRIT % 32 2*   PLATELETS Thousands/uL 208   NEUTROS PCT % 73   LYMPHS PCT % 15   MONOS PCT % 10   EOS PCT % 2       Results from last 7 days  Lab Units 18  0522  01/10/18  2041   SODIUM mmol/L 137  < > 134*   POTASSIUM mmol/L 3 3*  < > 3 6   CHLORIDE mmol/L 105  < > 101   CO2 mmol/L 25  < > 25   BUN mg/dL 7  < > 11   CREATININE mg/dL 0 85  < > 1 16   CALCIUM mg/dL 8 4  < > 9 4   TOTAL PROTEIN g/dL  --   --  7 3   BILIRUBIN TOTAL mg/dL  --   --  0 60   ALK PHOS U/L  --   --  74   ALT U/L  --   --  15   AST U/L  --   --  14   GLUCOSE RANDOM mg/dL 85  < > 103   < > = values in this interval not displayed  Results from last 7 days  Lab Units 01/10/18  2041   INR  1 01       * I Have Reviewed All Lab Data Listed Above  * Additional Pertinent Lab Tests Reviewed: All Labs Within Last 24 Hours Reviewed    Imaging:    Imaging Reports Reviewed Today Include:   Imaging Personally Reviewed by Myself Includes:      Recent Cultures (last 7 days):       Results from last 7 days  Lab Units 01/10/18  2110 01/10/18  2057 01/10/18  2041   BLOOD CULTURE   --    Escherichia coli* No Growth at 48 hrs  GRAM STAIN RESULT   --  Gram negative rods  --    URINE CULTURE  >100,000 cfu/ml Escherichia coli*  --   --        Last 24 Hours Medication List:     cefTRIAXone 1,000 mg Intravenous Q24H   cholecalciferol 1,000 Units Oral Daily   cyanocobalamin 1,000 mcg Oral Daily   ferrous sulfate 325 mg Oral Daily With Breakfast   heparin (porcine) 5,000 Units Subcutaneous Q8H [address]   levothyroxine 25 mcg Oral Early Morning   potassium chloride 40 mEq Oral Once        Today, Patient Was Seen By: Roman Gill MD    ** Please Note: Dragon 360 Dictation voice to text software may have been used in the creation of this document   **

## 2018-01-13 NOTE — ASSESSMENT & PLAN NOTE
· Bladder/prostate/colon cancer, s/p bladder cystectomy and radical prostatectomy with ileo conduit  · Has non-healing surgical wound adjacent to urostomy without surrounding erythema, edema, warmth  · CT scan report [id number] with the Urology as an outpatient  · No definite abscess on the CT scan    Monitor clinically

## 2018-01-13 NOTE — ASSESSMENT & PLAN NOTE
· Present at the time of admission as documented by leukocytosis fever and tachycardia   Improved  · Likely secondary to urinary tract infection  · Cont septra for 7 days

## 2018-01-13 NOTE — PLAN OF CARE
Problem: Potential for Falls  Goal: Patient will remain free of falls  INTERVENTIONS:  - Assess patient frequently for physical needs  -  Identify cognitive and physical deficits and behaviors that affect risk of falls    -  [place] fall precautions as indicated by assessment   - Educate patient/family on patient safety including physical limitations  - Instruct patient to call for assistance with activity based on assessment  - Modify environment to reduce risk of injury  - Consider OT/PT consult to assist with strengthening/mobility   Outcome: Progressing      Problem: Prexisting or High Potential for Compromised Skin Integrity  Goal: Skin integrity is maintained or improved  INTERVENTIONS:  - Identify patients at risk for skin breakdown  - Assess and monitor skin integrity  - Assess and monitor nutrition and hydration status  - Monitor labs (i e  albumin)  - Assess for incontinence   - Turn and reposition patient  - Assist with mobility/ambulation  - Relieve pressure over bony prominences  - Avoid friction and shearing  - Provide appropriate hygiene as needed including keeping skin clean and dry  - Evaluate need for skin moisturizer/barrier cream  - Collaborate with interdisciplinary team (i e  Nutrition, Rehabilitation, etc )   - Patient/family teaching   Outcome: Progressing

## 2018-01-13 NOTE — PROGRESS NOTES
Pt dressed and ready to go to [facility]  IV pulled  Wife at bedside and will follow in her car to the facility  Pt ao to self  Confused to situation, place, and time  Will hand off to EMS  Report called to Elfego Dixon at [facility]

## 2018-01-13 NOTE — ASSESSMENT & PLAN NOTE
· Present at the time of admission as documented by leukocytosis fever and tachycardia   Improved  · Likely secondary to urinary tract infection

## 2018-01-13 NOTE — ASSESSMENT & PLAN NOTE
· Presented from [facility] with fever  · TMax 100 9    · Leukocytosis of 11 24 at presentation and currently normalized  · Start on septra  ·

## 2018-01-13 NOTE — PLAN OF CARE
Problem: DISCHARGE PLANNING - CARE MANAGEMENT  Goal: Discharge to post-acute care or home with appropriate resources  INTERVENTIONS:  - Conduct assessment to determine patient/family and health care team treatment goals, and need for post-acute services based on payer coverage, community resources, and patient preferences, and barriers to discharge  - Address psychosocial, clinical, and financial barriers to discharge as identified in assessment in conjunction with the patient/family and health care team  - Arrange appropriate level of post-acute services according to patients   needs and preference and payer coverage in collaboration with the physician and health care team  - Communicate with and update the patient/family, physician, and health care team regarding progress on the discharge plan  - Arrange appropriate transportation to post-acute venues  Outcome: Completed Date Met: 01/13/18  LOS 3  Bundle pt; Not a readmission  Pt is stable for DC and is able to return to  [facility]  Updated clinical has been sent to   Transportation has been arranged with [employer] as there was no other availability with [employer] or [employer]  Pt will return via BLS at 1630  Nursing staff, facility and wife have been made available  L/M to review IMM with wife  LMN completed and placed in chart binder  No further Cm needs at this time  [facility] referral has been placed for [facility] as banner was noted on chart  [facility] has been notified of DC

## 2018-01-15 VITALS
WEIGHT: 155.5 LBS | OXYGEN SATURATION: 98 % | HEIGHT: 66 IN | HEART RATE: 41 BPM | DIASTOLIC BLOOD PRESSURE: 62 MMHG | TEMPERATURE: 96.6 F | RESPIRATION RATE: 20 BRPM | BODY MASS INDEX: 24.99 KG/M2 | SYSTOLIC BLOOD PRESSURE: 112 MMHG

## 2018-01-15 LAB
BACTERIA BLD CULT: NORMAL
BACTERIA UR CULT: ABNORMAL
BACTERIA UR CULT: ABNORMAL

## 2018-01-22 VITALS
HEIGHT: 66 IN | WEIGHT: 135 LBS | BODY MASS INDEX: 21.69 KG/M2 | DIASTOLIC BLOOD PRESSURE: 78 MMHG | SYSTOLIC BLOOD PRESSURE: 112 MMHG | OXYGEN SATURATION: 99 % | RESPIRATION RATE: 18 BRPM | HEART RATE: 89 BPM | TEMPERATURE: 97.3 F

## 2018-02-08 ENCOUNTER — TRANSCRIBE ORDERS (OUTPATIENT)
Dept: ADMINISTRATIVE | Facility: HOSPITAL | Age: 83
End: 2018-02-08

## 2018-02-08 DIAGNOSIS — K08.89 CHEWING DIFFICULTY: Primary | ICD-10-CM

## 2018-02-12 ENCOUNTER — HOSPITAL ENCOUNTER (OUTPATIENT)
Dept: RADIOLOGY | Facility: HOSPITAL | Age: 83
Discharge: HOME | End: 2018-02-12

## 2018-02-12 DIAGNOSIS — K08.89 CHEWING DIFFICULTY: ICD-10-CM

## 2018-02-15 ENCOUNTER — HOSPITAL ENCOUNTER (OUTPATIENT)
Dept: RADIOLOGY | Facility: HOSPITAL | Age: 83
Discharge: HOME | End: 2018-02-15
Payer: MEDICARE

## 2018-02-15 PROCEDURE — G8998 SWALLOW D/C STATUS: HCPCS

## 2018-02-15 PROCEDURE — G8996 SWALLOW CURRENT STATUS: HCPCS

## 2018-02-15 PROCEDURE — G8997 SWALLOW GOAL STATUS: HCPCS

## 2018-02-15 PROCEDURE — 92611 MOTION FLUOROSCOPY/SWALLOW: CPT

## 2018-02-15 PROCEDURE — 74230 X-RAY XM SWLNG FUNCJ C+: CPT

## 2018-02-15 NOTE — PROCEDURES
Video Swallow Study      Patient Name: Alec Mobley  PTGGB'U Date: 2/15/2018        Past Medical History  Past Medical History:   Diagnosis Date    [facility] [address]     Bursitis     shoulders and back    Cancer [facility]     bladder, prostate    Coronary artery disease     Hard of hearing     Hyperlipidemia     Hypertension     Hypothyroidism 1/10/2018    Memory loss     Myocardial infarction 1987    Prostate CA ([facility]     UTI (urinary tract infection) 1/10/2018    Vascular dementia         Past Surgical History  Past Surgical History:   Procedure Laterality Date    CATARACT EXTRACTION      COLONOSCOPY      COLOSTOMY      CYSTECTOMY W/ URETEROILEAL CONDUIT      OR LAP,SURG,COLECTOMY,W/REMVL TERM ILEUM Right 7/21/2017    Procedure: LAPAROSCOPIC ASSISTED COLON RESECITON, RIGHT COLON RESECTION, REPAIR INCISIONAL HERNIA;  Surgeon: Francisca Vasquez MD;  [facility];  Service: General    OR REPAIR INCISIONAL HERNIA,REDUCIBLE N/A 7/21/2017    Procedure: OPEN REPAIR PARA-STOMAL HERNIA;  Surgeon: Francisca Vasquez MD;  [facility];  Service: General         General Information:    79 yo gentleman referred to [facility]  for a VBS by Dr Mary Jewell for dysphagia w/  c/o difficulty chewing  Pt's wife reported she requested pt's meats be chopped or ground due to pt not wearing his lower dentures  A speech therapist came to see pt and the VBS was ordered  Pt and wife deny difficulty swallowing food, liquids, and pills  Cognition:  Pleasant, cooperative, followed commands  Speech/Swallow Mec: Oral motor movements appeared  WNL; Dentition was  Upper dentures only; Respiratory Status: WFL on RA;   Current diet: [facility] soft-ground w/ thin liquids  Prior VBS none      Pt was seen in radiology for a Video Barium Swallow Study, seated in the upright position and viewed laterally with the following consistencies: puree, soft/solid, hard solid, HTL, NTL, thin liquids, and barium pill w/ water  Results are as follows:     **Images are available for review on PACS          Oral Stage:   pt took several bites of food and multiple sips of liquids by cup  Slightly prolonged but adequate mastication, manipulation and transfer  Pt w/ good oral control of liquids  Pharyngeal Stage:   Swallows timely w/ complete epiglottic inversion and airway closure  Mild pharyngeal retention which cleared w/ liquid wash or secondary swallows; swallows audible at times  No penetration or aspiration observed  Barium pill passed through pharynx w/o dififculty  Esophageal Stage:   briefly assessed, no overt abnormality  Assessment Summary:   Oral and pharyngeal swallow appears WFL  No penetration or aspiration observed  Diagnosis/Prognosis:            Recommendations:   cont present diet- [facility] soft/ground meats w/ thin liquids (wife stated she is happy w/ pt's current diet)  meds as tolerated

## 2018-03-21 ENCOUNTER — APPOINTMENT (EMERGENCY)
Dept: CT IMAGING | Facility: HOSPITAL | Age: 83
DRG: 394 | End: 2018-03-21
Payer: MEDICARE

## 2018-03-21 ENCOUNTER — HOSPITAL ENCOUNTER (INPATIENT)
Facility: HOSPITAL | Age: 83
LOS: 1 days | Discharge: HOME | DRG: 394 | End: 2018-03-23
Attending: EMERGENCY MEDICINE | Admitting: SURGERY
Payer: MEDICARE

## 2018-03-21 DIAGNOSIS — L03.311 CELLULITIS OF ABDOMINAL WALL: ICD-10-CM

## 2018-03-21 DIAGNOSIS — T81.9XXA POST SURGICAL COMPLICATION: Primary | ICD-10-CM

## 2018-03-21 DIAGNOSIS — R10.9 ABDOMINAL PAIN: ICD-10-CM

## 2018-03-21 LAB
ALBUMIN SERPL BCP-MCNC: 3 G/DL (ref 3.5–5)
ALP SERPL-CCNC: 56 U/L (ref 46–116)
ALT SERPL W P-5'-P-CCNC: 16 U/L (ref 12–78)
ANION GAP SERPL CALCULATED.3IONS-SCNC: 9 MMOL/L (ref 4–13)
APTT PPP: 36 SECONDS (ref 23–35)
AST SERPL W P-5'-P-CCNC: 11 U/L (ref 5–45)
BASOPHILS # BLD AUTO: 0.01 THOUSANDS/ΜL (ref 0–0.1)
BASOPHILS NFR BLD AUTO: 0 % (ref 0–1)
BILIRUB SERPL-MCNC: 0.5 MG/DL (ref 0.2–1)
BUN SERPL-MCNC: 19 MG/DL (ref 5–25)
CALCIUM SERPL-MCNC: 8.6 MG/DL (ref 8.3–10.1)
CHLORIDE SERPL-SCNC: 103 MMOL/L (ref 100–108)
CO2 SERPL-SCNC: 25 MMOL/L (ref 21–32)
CREAT SERPL-MCNC: 1.13 MG/DL (ref 0.6–1.3)
EOSINOPHIL # BLD AUTO: 0.02 THOUSAND/ΜL (ref 0–0.61)
EOSINOPHIL NFR BLD AUTO: 0 % (ref 0–6)
ERYTHROCYTE [DISTWIDTH] IN BLOOD BY AUTOMATED COUNT: 15.4 % (ref 11.6–15.1)
GFR SERPL CREATININE-BSD FRML MDRD: 59 ML/MIN/1.73SQ M
GLUCOSE SERPL-MCNC: 114 MG/DL (ref 65–140)
HCT VFR BLD AUTO: 35.8 % (ref 36.5–49.3)
HGB BLD-MCNC: 11.7 G/DL (ref 12–17)
INR PPP: 1.04 (ref 0.86–1.16)
LYMPHOCYTES # BLD AUTO: 0.82 THOUSANDS/ΜL (ref 0.6–4.47)
LYMPHOCYTES NFR BLD AUTO: 11 % (ref 14–44)
MCH RBC QN AUTO: 30.7 PG (ref 26.8–34.3)
MCHC RBC AUTO-ENTMCNC: 32.7 G/DL (ref 31.4–37.4)
MCV RBC AUTO: 94 FL (ref 82–98)
MONOCYTES # BLD AUTO: 0.65 THOUSAND/ΜL (ref 0.17–1.22)
MONOCYTES NFR BLD AUTO: 9 % (ref 4–12)
NEUTROPHILS # BLD AUTO: 6.11 THOUSANDS/ΜL (ref 1.85–7.62)
NEUTS SEG NFR BLD AUTO: 80 % (ref 43–75)
PLATELET # BLD AUTO: 111 THOUSANDS/UL (ref 149–390)
PMV BLD AUTO: 10.6 FL (ref 8.9–12.7)
POTASSIUM SERPL-SCNC: 3.9 MMOL/L (ref 3.5–5.3)
PROT SERPL-MCNC: 6.9 G/DL (ref 6.4–8.2)
PROTHROMBIN TIME: 13.9 SECONDS (ref 12.1–14.4)
RBC # BLD AUTO: 3.81 MILLION/UL (ref 3.88–5.62)
SODIUM SERPL-SCNC: 137 MMOL/L (ref 136–145)
WBC # BLD AUTO: 7.61 THOUSAND/UL (ref 4.31–10.16)

## 2018-03-21 PROCEDURE — 36415 COLL VENOUS BLD VENIPUNCTURE: CPT | Performed by: PHYSICIAN ASSISTANT

## 2018-03-21 PROCEDURE — 96361 HYDRATE IV INFUSION ADD-ON: CPT

## 2018-03-21 PROCEDURE — 99284 EMERGENCY DEPT VISIT MOD MDM: CPT

## 2018-03-21 PROCEDURE — 74177 CT ABD & PELVIS W/CONTRAST: CPT

## 2018-03-21 PROCEDURE — 85730 THROMBOPLASTIN TIME PARTIAL: CPT | Performed by: PHYSICIAN ASSISTANT

## 2018-03-21 PROCEDURE — 85025 COMPLETE CBC W/AUTO DIFF WBC: CPT | Performed by: PHYSICIAN ASSISTANT

## 2018-03-21 PROCEDURE — 85610 PROTHROMBIN TIME: CPT | Performed by: PHYSICIAN ASSISTANT

## 2018-03-21 PROCEDURE — 96360 HYDRATION IV INFUSION INIT: CPT

## 2018-03-21 PROCEDURE — 87040 BLOOD CULTURE FOR BACTERIA: CPT | Performed by: PHYSICIAN ASSISTANT

## 2018-03-21 PROCEDURE — 80053 COMPREHEN METABOLIC PANEL: CPT | Performed by: PHYSICIAN ASSISTANT

## 2018-03-21 RX ORDER — LEVOFLOXACIN 5 MG/ML
500 INJECTION, SOLUTION INTRAVENOUS EVERY 24 HOURS
Status: DISCONTINUED | OUTPATIENT
Start: 2018-03-21 | End: 2018-03-23 | Stop reason: HOSPADM

## 2018-03-21 RX ORDER — ALPRAZOLAM 0.25 MG/1
0.25 TABLET ORAL 2 TIMES DAILY PRN
Status: DISCONTINUED | OUTPATIENT
Start: 2018-03-21 | End: 2018-03-23 | Stop reason: HOSPADM

## 2018-03-21 RX ORDER — ACETAMINOPHEN 325 MG/1
650 TABLET ORAL EVERY 6 HOURS PRN
Status: DISCONTINUED | OUTPATIENT
Start: 2018-03-21 | End: 2018-03-23 | Stop reason: HOSPADM

## 2018-03-21 RX ORDER — LEVOTHYROXINE SODIUM 0.03 MG/1
25 TABLET ORAL
Status: DISCONTINUED | OUTPATIENT
Start: 2018-03-21 | End: 2018-03-23 | Stop reason: HOSPADM

## 2018-03-21 RX ORDER — SODIUM CHLORIDE, SODIUM LACTATE, POTASSIUM CHLORIDE, CALCIUM CHLORIDE 600; 310; 30; 20 MG/100ML; MG/100ML; MG/100ML; MG/100ML
50 INJECTION, SOLUTION INTRAVENOUS CONTINUOUS
Status: DISCONTINUED | OUTPATIENT
Start: 2018-03-21 | End: 2018-03-23 | Stop reason: HOSPADM

## 2018-03-21 RX ORDER — FERROUS SULFATE 325(65) MG
325 TABLET ORAL
Status: DISCONTINUED | OUTPATIENT
Start: 2018-03-22 | End: 2018-03-23 | Stop reason: HOSPADM

## 2018-03-21 RX ORDER — ONDANSETRON 2 MG/ML
4 INJECTION INTRAMUSCULAR; INTRAVENOUS EVERY 6 HOURS PRN
Status: DISCONTINUED | OUTPATIENT
Start: 2018-03-21 | End: 2018-03-23 | Stop reason: HOSPADM

## 2018-03-21 RX ADMIN — METRONIDAZOLE 500 MG: 500 INJECTION, SOLUTION INTRAVENOUS at 15:11

## 2018-03-21 RX ADMIN — SODIUM CHLORIDE 1000 ML: 0.9 INJECTION, SOLUTION INTRAVENOUS at 11:24

## 2018-03-21 RX ADMIN — SODIUM CHLORIDE, SODIUM LACTATE, POTASSIUM CHLORIDE, AND CALCIUM CHLORIDE 50 ML/HR: .6; .31; .03; .02 INJECTION, SOLUTION INTRAVENOUS at 20:11

## 2018-03-21 RX ADMIN — IOHEXOL 50 ML: 240 INJECTION, SOLUTION INTRATHECAL; INTRAVASCULAR; INTRAVENOUS; ORAL at 11:34

## 2018-03-21 RX ADMIN — IOHEXOL 100 ML: 350 INJECTION, SOLUTION INTRAVENOUS at 12:40

## 2018-03-21 RX ADMIN — METRONIDAZOLE 500 MG: 500 INJECTION, SOLUTION INTRAVENOUS at 22:27

## 2018-03-21 RX ADMIN — ALPRAZOLAM 0.25 MG: 0.25 TABLET ORAL at 18:24

## 2018-03-21 RX ADMIN — LEVOFLOXACIN 500 MG: 5 INJECTION, SOLUTION INTRAVENOUS at 20:11

## 2018-03-21 RX ADMIN — ENOXAPARIN SODIUM 40 MG: 40 INJECTION SUBCUTANEOUS at 15:21

## 2018-03-21 NOTE — H&P
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  Plan:  -admit under observation  -IV abx  -IV fluids  -wound care- ostomy over wound to quantify drainage  -regular diet for now  -resume home meds  -DVT ppx    History of Present Illness   History, ROS and PFSH unobtainable from any source due to n/a  HPI:  Blaine Baker is a 80 y o  male who presents with drainage from his abdominal wound  Pt is a very poor historian and claims he does not know why he is in the ER or how he got here, but per ED reports he was sent over from his nursing home due to drainage from his midline incision that just started this am  He denies any pain, fever, chills, chest pain, or shortness of breath       Review of Systems   Unable to perform ROS: Dementia       Historical Information   Past Medical History:   Diagnosis Date    [facility] [address] [facility]     Bursitis     shoulders and back    Cancer ([facility] [address] )     bladder, prostate    Coronary artery disease     Hard of hearing     Hyperlipidemia     Hypertension     Hypothyroidism 1/10/2018    Memory loss     Myocardial infarction 1987    Prostate CA ([facility] [address] )     UTI (urinary tract infection) 1/10/2018    Vascular dementia      Past Surgical History:   Procedure Laterality Date    CATARACT EXTRACTION      COLONOSCOPY      COLOSTOMY      CYSTECTOMY W/ URETEROILEAL CONDUIT      MD LAP,SURG,COLECTOMY,W/REMVL TERM ILEUM Right 7/21/2017    Procedure: LAPAROSCOPIC ASSISTED COLON RESECITON, RIGHT COLON RESECTION, REPAIR INCISIONAL HERNIA;  Surgeon: Shanna Dickens MD;  Location:  MAIN OR;  Service: General    [address] [place] [address] N/A 7/21/2017    Procedure: OPEN REPAIR PARA-STOMAL HERNIA;  Surgeon: Shashank Hatfield MD;  [facility];  Service: General     Social History   History   Alcohol Use No     History   Drug Use No     History   Smoking Status    Former Smoker    Quit date: 1987   Smokeless Tobacco    Former User     Family History: History reviewed  No pertinent family history  Meds/Allergies   PTA meds:   Prior to Admission Medications   Prescriptions Last Dose Informant Patient Reported? Taking?    ALPRAZolam (XANAX) 0 25 mg tablet   Yes No   Sig: Take 0 25 mg by mouth 2 (two) times a day as needed for anxiety   Multiple Vitamin (MULTIVITAMIN) tablet   Yes No   Sig: Take 1 tablet by mouth daily   acetaminophen (TYLENOL) 325 mg tablet   Yes No   Sig: Take 650 mg by mouth every 6 (six) hours as needed for mild pain   cholecalciferol (VITAMIN D3) 1,000 units tablet   Yes No   Sig: Take 1,000 Units by mouth daily   cyanocobalamin (VITAMIN B-12) 1,000 mcg tablet   Yes No   Sig: Take by mouth daily   ferrous sulfate 325 (65 Fe) mg tablet   Yes No   Sig: Take 325 mg by mouth daily with breakfast   levothyroxine 25 mcg tablet   Yes No   Sig: Take 25 mcg by mouth daily      Facility-Administered Medications: None     No Known Allergies    Objective   First Vitals:   Blood Pressure: 112/64 (03/21/18 1025)  Pulse: 86 (03/21/18 1025)  Temperature: 97 7 °F (36 5 °C) (03/21/18 1025)  Temp Source: Oral (03/21/18 1025)  Respirations: 16 (03/21/18 1025)  Weight - Scale: 63 6 kg (140 lb 3 4 oz) (03/21/18 1025)  SpO2: 96 % (03/21/18 1025)    Current Vitals:   Blood Pressure: 112/64 (03/21/18 1025)  Pulse: 86 (03/21/18 1025)  Temperature: 97 7 °F (36 5 °C) (03/21/18 1025)  Temp Source: Oral (03/21/18 1025)  Respirations: 16 (03/21/18 1025)  Weight - Scale: 63 6 kg (140 lb 3 4 oz) (03/21/18 1025)  SpO2: 96 % (03/21/18 1025)    No intake or output data in the 24 hours ending 03/21/18 1438    Invasive Devices     Peripheral Intravenous Line            Peripheral IV 03/21/18 Right Antecubital less than 1 day Drain            Urostomy Ileal conduit RLQ -- days                Physical Exam   Constitutional: He is oriented to person, place, and time  He appears well-developed and well-nourished  HENT:   Head: Normocephalic and atraumatic  Eyes: Conjunctivae are normal  Pupils are equal, round, and reactive to light  Neck: Normal range of motion  Neck supple  Cardiovascular: Normal rate  Pulmonary/Chest: Effort normal and breath sounds normal    Abdominal: Soft  Two punctate areas less than 5 mm in his lower midline incision  One is very small and has minimal drainage  The more inferior is slightly bigger and drains succus from his midline with pressure  There is only very faint erythema to the left of the midline incision around these areas of drainage  Abd otherwise soft, NT, ND  He has a large parastomal hernia that is soft and nontender around his ileal conduit    Musculoskeletal: Normal range of motion  Neurological: He is alert and oriented to person, place, and time  Skin: Skin is warm and dry  Lab Results:   CBC:   Lab Results   Component Value Date    WBC 7 61 03/21/2018    HGB 11 7 (L) 03/21/2018    HCT 35 8 (L) 03/21/2018    MCV 94 03/21/2018     (L) 03/21/2018    MCH 30 7 03/21/2018    MCHC 32 7 03/21/2018    RDW 15 4 (H) 03/21/2018    MPV 10 6 03/21/2018   , CMP:   Lab Results   Component Value Date     03/21/2018    K 3 9 03/21/2018     03/21/2018    CO2 25 03/21/2018    ANIONGAP 9 03/21/2018    BUN 19 03/21/2018    CREATININE 1 13 03/21/2018    GLUCOSE 114 03/21/2018    CALCIUM 8 6 03/21/2018    AST 11 03/21/2018    ALT 16 03/21/2018    ALKPHOS 56 03/21/2018    PROT 6 9 03/21/2018    BILITOT 0 50 03/21/2018    EGFR 59 03/21/2018     Imaging: I have personally reviewed pertinent reports  and I have personally reviewed pertinent films in PACS  EKG, Pathology, and Other Studies: I have personally reviewed pertinent reports     and I have personally reviewed pertinent films in PACS    Code Status: Level 1 - Full Code  Advance Directive and Living Will: Yes    Power of :    POLST:      Counseling / Coordination of Care  Total floor / unit time spent today 40 minutes  Greater than 50% of total time was spent with the patient and / or family counseling and / or coordination of care

## 2018-03-21 NOTE — ED PROVIDER NOTES
History  Chief Complaint   Patient presents with    Post-op Problem     EMS reports [facility] staff reported drainage from post op incision site after colectomy  59-year-old male presents to the emergency department with abdominal pain  Patient has history of a colectomy in July 2017  She does mention currently resides at [facility]  Staff reported this morning that he had some drainage from his incision  States that  Drainage is dark and smells foul like stool  History provided by:  Patient and nursing home   used: No        Prior to Admission Medications   Prescriptions Last Dose Informant Patient Reported? Taking?    ALPRAZolam (XANAX) 0 25 mg tablet   Yes No   Sig: Take 0 25 mg by mouth 2 (two) times a day as needed for anxiety   Multiple Vitamin (MULTIVITAMIN) tablet   Yes No   Sig: Take 1 tablet by mouth daily   acetaminophen (TYLENOL) 325 mg tablet   Yes No   Sig: Take 650 mg by mouth every 6 (six) hours as needed for mild pain   cholecalciferol (VITAMIN D3) 1,000 units tablet   Yes No   Sig: Take 1,000 Units by mouth daily   cyanocobalamin (VITAMIN B-12) 1,000 mcg tablet   Yes No   Sig: Take by mouth daily   ferrous sulfate 325 (65 Fe) mg tablet   Yes No   Sig: Take 325 mg by mouth daily with breakfast   levothyroxine 25 mcg tablet   Yes No   Sig: Take 25 mcg by mouth daily      Facility-Administered Medications: None       Past Medical History:   Diagnosis Date    [facility]     Bursitis     shoulders and back    Cancer [facility] )     bladder, prostate    Coronary artery disease     Hard of hearing     Hyperlipidemia     Hypertension     Hypothyroidism 1/10/2018    Memory loss     Myocardial infarction 1987    Prostate CA ([facility]     UTI (urinary tract infection) 1/10/2018    Vascular dementia        Past Surgical History:   Procedure Laterality Date    CATARACT EXTRACTION      COLONOSCOPY      COLOSTOMY      CYSTECTOMY W/ URETEROILEAL CONDUIT      RI LAP,SURG,COLECTOMY,W/REMVL TERM ILEUM Right 7/21/2017    Procedure: LAPAROSCOPIC ASSISTED COLON RESECITON, RIGHT COLON RESECTION, REPAIR INCISIONAL HERNIA;  Surgeon: Reba Pham MD;  [facility];  Service: General    [address] [place] [address] N/A 7/21/2017    Procedure: OPEN REPAIR PARA-STOMAL HERNIA;  Surgeon: Reba Pham MD;  [facility];  Service: General       History reviewed  No pertinent family history  I have reviewed and agree with the history as documented  Social History   Substance Use Topics    Smoking status: Former Smoker     Quit date: 1987    Smokeless tobacco: Former User    Alcohol use No        Review of Systems   Unable to perform ROS: Dementia       Physical Exam  ED Triage Vitals [03/21/18 1025]   Temperature Pulse Respirations Blood Pressure SpO2   97 7 °F (36 5 °C) 86 16 112/64 96 %      Temp Source Heart Rate Source Patient Position - Orthostatic VS BP Location FiO2 (%)   Oral Monitor -- -- --      Pain Score       No Pain           Orthostatic Vital Signs  Vitals:    03/21/18 1025   BP: 112/64   Pulse: 86       Physical Exam   Constitutional: He is oriented to person, place, and time  He appears well-developed and well-nourished  No distress  HENT:   Head: Normocephalic and atraumatic  Right Ear: External ear normal    Left Ear: External ear normal    Mouth/Throat: Oropharynx is clear and moist  No oropharyngeal exudate  Eyes: Conjunctivae are normal    Neck: No JVD present  No tracheal deviation present  Cardiovascular: Normal rate, regular rhythm and normal heart sounds  Exam reveals no gallop and no friction rub  No murmur heard  Pulmonary/Chest: Effort normal and breath sounds normal  No respiratory distress  He has no wheezes  He has no rales  He exhibits no tenderness  Abdominal: Soft  Bowel sounds are normal  He exhibits no distension  There is tenderness  There is no guarding         Musculoskeletal: Normal range of motion  He exhibits no edema, tenderness or deformity  Lymphadenopathy:     He has no cervical adenopathy  Neurological: He is alert and oriented to person, place, and time  Skin: Skin is warm and dry  No rash noted  He is not diaphoretic  No erythema  Psychiatric: He has a normal mood and affect  His behavior is normal    Nursing note and vitals reviewed  ED Medications  Medications   sodium chloride 0 9 % bolus 1,000 mL (1,000 mL Intravenous New Bag 3/21/18 1124)   iohexol (OMNIPAQUE) 240 MG/ML solution 50 mL (50 mL Oral Given 3/21/18 1134)   iohexol (OMNIPAQUE) 350 MG/ML injection (MULTI-DOSE) 100 mL (100 mL Intravenous Given 3/21/18 1240)       Diagnostic Studies  Results Reviewed     Procedure Component Value Units Date/Time    Blood culture #2 [[id number]] Collected:  03/21/18 1125    Lab Status: In process Specimen:  Blood from Arm, Right Updated:  03/21/18 1129    Comprehensive metabolic panel [[id number]]  (Abnormal) Collected:  03/21/18 1052    Lab Status:  Final result Specimen:  Blood from Hand, Right Updated:  03/21/18 1121     Sodium 137 mmol/L      Potassium 3 9 mmol/L      Chloride 103 mmol/L      CO2 25 mmol/L      Anion Gap 9 mmol/L      BUN 19 mg/dL      Creatinine 1 13 mg/dL      Glucose 114 mg/dL      Calcium 8 6 mg/dL      AST 11 U/L      ALT 16 U/L      Alkaline Phosphatase 56 U/L      Total Protein 6 9 g/dL      Albumin 3 0 (L) g/dL      Total Bilirubin 0 50 mg/dL      eGFR 59 ml/min/1 73sq m     Narrative:         National Kidney Disease Education Program recommendations are as follows:  GFR calculation is accurate only with a steady state creatinine  Chronic Kidney disease less than 60 ml/min/1 73 sq  meters  Kidney failure less than 15 ml/min/1 73 sq  meters      Protime-INR [[id number]]  (Normal) Collected:  03/21/18 1052    Lab Status:  Final result Specimen:  Blood from Hand, Right Updated:  03/21/18 1116     Protime 13 9 seconds      INR 1 04    APTT [[id number]] (Abnormal) Collected:  03/21/18 1052    Lab Status:  Final result Specimen:  Blood from Hand, Right Updated:  03/21/18 1116     PTT 36 (H) seconds     Narrative: Therapeutic Heparin Range = 60-90 seconds    CBC and differential [[id number]]  (Abnormal) Collected:  03/21/18 1052    Lab Status:  Final result Specimen:  Blood from Hand, Right Updated:  03/21/18 1059     WBC 7 61 Thousand/uL      RBC 3 81 (L) Million/uL      Hemoglobin 11 7 (L) g/dL      Hematocrit 35 8 (L) %      MCV 94 fL      MCH 30 7 pg      MCHC 32 7 g/dL      RDW 15 4 (H) %      MPV 10 6 fL      Platelets 209 (L) Thousands/uL      Neutrophils Relative 80 (H) %      Lymphocytes Relative 11 (L) %      Monocytes Relative 9 %      Eosinophils Relative 0 %      Basophils Relative 0 %      Neutrophils Absolute 6 11 Thousands/µL      Lymphocytes Absolute 0 82 Thousands/µL      Monocytes Absolute 0 65 Thousand/µL      Eosinophils Absolute 0 02 Thousand/µL      Basophils Absolute 0 01 Thousands/µL     Blood culture #1 [[id number]] Collected:  03/21/18 1052    Lab Status: In process Specimen:  Blood from Hand, Right Updated:  03/21/18 1055                 CT abdomen pelvis with contrast   Final Result by Raffy Crawford MD (03/21 1337)      1  Anterior abdominal wall soft tissue likely related to the reported incisional drainage  This was not present on the previous study and is suspicious for acute inflammatory phlegmon  No discrete abscess is identified  2   Right lower quadrant ileal conduit with herniation of right colon into the ostomy, not present previously  No obstruction identified  3   Ileal conduit and right lower quadrant small bowel loops are unchanged  Workstation performed: [id number]                    Procedures  Procedures       Phone Contacts  ED Phone Contact    ED Course  ED Course as of Mar 21 1434   Wed Mar 21, 2018   1356   Spoke with surgical resident    Will come down to evaluate patient in the emergency department  MDM  Number of Diagnoses or Management Options  Abdominal pain:   Post surgical complication:   Diagnosis management comments:   Differential diagnosis includes but not limited to:    Seroma, GI fistula, abscess       Amount and/or Complexity of Data Reviewed  Clinical lab tests: ordered and reviewed  Tests in the radiology section of CPT®: ordered and reviewed      CritCare Time    Disposition  Final diagnoses:   Post surgical complication   Abdominal pain     Time reflects when diagnosis was documented in both MDM as applicable and the Disposition within this note     Time User Action Codes Description Comment    3/21/2018  2:31 PM Madan Stallings Add [T81  9XXA] Post surgical complication     4/65/7058  2:31 PM Madan Stallings Add [R10 9] Abdominal pain       ED Disposition     ED Disposition Condition Comment    Admit  Case was discussed with surgery and the patient's admission status was agreed to be Admission Status: observation status to the service of Dr Harika Grider   Follow-up Information    None       Patient's Medications   Discharge Prescriptions    No medications on file     No discharge procedures on file      ED Provider  Electronically Signed by           Melanie Perkins PA-C  03/21/18 9856

## 2018-03-22 PROBLEM — L03.90 CELLULITIS: Status: ACTIVE | Noted: 2018-03-22

## 2018-03-22 LAB
ANION GAP SERPL CALCULATED.3IONS-SCNC: 10 MMOL/L (ref 4–13)
BUN SERPL-MCNC: 15 MG/DL (ref 5–25)
CALCIUM SERPL-MCNC: 8.7 MG/DL (ref 8.3–10.1)
CHLORIDE SERPL-SCNC: 104 MMOL/L (ref 100–108)
CO2 SERPL-SCNC: 24 MMOL/L (ref 21–32)
CREAT SERPL-MCNC: 1.06 MG/DL (ref 0.6–1.3)
ERYTHROCYTE [DISTWIDTH] IN BLOOD BY AUTOMATED COUNT: 15 % (ref 11.6–15.1)
GFR SERPL CREATININE-BSD FRML MDRD: 64 ML/MIN/1.73SQ M
GLUCOSE SERPL-MCNC: 91 MG/DL (ref 65–140)
HCT VFR BLD AUTO: 37.1 % (ref 36.5–49.3)
HGB BLD-MCNC: 12.1 G/DL (ref 12–17)
MCH RBC QN AUTO: 30.4 PG (ref 26.8–34.3)
MCHC RBC AUTO-ENTMCNC: 32.6 G/DL (ref 31.4–37.4)
MCV RBC AUTO: 93 FL (ref 82–98)
PLATELET # BLD AUTO: 128 THOUSANDS/UL (ref 149–390)
PMV BLD AUTO: 10.7 FL (ref 8.9–12.7)
POTASSIUM SERPL-SCNC: 3.9 MMOL/L (ref 3.5–5.3)
RBC # BLD AUTO: 3.98 MILLION/UL (ref 3.88–5.62)
SODIUM SERPL-SCNC: 138 MMOL/L (ref 136–145)
WBC # BLD AUTO: 5.66 THOUSAND/UL (ref 4.31–10.16)

## 2018-03-22 PROCEDURE — G8989 SELF CARE D/C STATUS: HCPCS

## 2018-03-22 PROCEDURE — 85027 COMPLETE CBC AUTOMATED: CPT | Performed by: SURGERY

## 2018-03-22 PROCEDURE — G8988 SELF CARE GOAL STATUS: HCPCS

## 2018-03-22 PROCEDURE — 97167 OT EVAL HIGH COMPLEX 60 MIN: CPT

## 2018-03-22 PROCEDURE — 80048 BASIC METABOLIC PNL TOTAL CA: CPT | Performed by: SURGERY

## 2018-03-22 PROCEDURE — G8987 SELF CARE CURRENT STATUS: HCPCS

## 2018-03-22 RX ORDER — POTASSIUM CHLORIDE 20 MEQ/1
20 TABLET, EXTENDED RELEASE ORAL ONCE
Status: COMPLETED | OUTPATIENT
Start: 2018-03-22 | End: 2018-03-22

## 2018-03-22 RX ADMIN — METRONIDAZOLE 500 MG: 500 INJECTION, SOLUTION INTRAVENOUS at 22:51

## 2018-03-22 RX ADMIN — METRONIDAZOLE 500 MG: 500 INJECTION, SOLUTION INTRAVENOUS at 05:55

## 2018-03-22 RX ADMIN — POTASSIUM CHLORIDE 20 MEQ: 1500 TABLET, EXTENDED RELEASE ORAL at 07:54

## 2018-03-22 RX ADMIN — ENOXAPARIN SODIUM 40 MG: 40 INJECTION SUBCUTANEOUS at 07:57

## 2018-03-22 RX ADMIN — METRONIDAZOLE 500 MG: 500 INJECTION, SOLUTION INTRAVENOUS at 14:06

## 2018-03-22 RX ADMIN — LEVOTHYROXINE SODIUM 25 MCG: 25 TABLET ORAL at 05:55

## 2018-03-22 RX ADMIN — LEVOFLOXACIN 500 MG: 5 INJECTION, SOLUTION INTRAVENOUS at 14:56

## 2018-03-22 RX ADMIN — FERROUS SULFATE TAB 325 MG (65 MG ELEMENTAL FE) 325 MG: 325 (65 FE) TAB at 07:54

## 2018-03-22 RX ADMIN — ALPRAZOLAM 0.25 MG: 0.25 TABLET ORAL at 16:16

## 2018-03-22 NOTE — CASE MANAGEMENT
Initial Clinical Review    ADMIT OBS on 3/21 @ 1433  CHANGED to INPT on 3/22 @ 1442  Due to ongoing drainage from wound and need to r/o enterocutaneous fistula       Procedures    Place in Observation (expected length of stay for this patient is less than two midnights)     Standing Status:   Standing     Number of Occurrences:   1     Order Specific Question:   Admitting Physician     Answer:   Kelli Almeida [[id number]]     Order Specific Question:   Level of Care     Answer:   Med Surg [16]    Place in Observation     Standing Status:   Standing     Number of Occurrences:   1     Order Specific Question:   Admitting Physician     Answer:   Kelli Almeida [[id number]]     Order Specific Question:   Level of Care     Answer:   Med Surg [16]    Inpatient Admission     Standing Status:   Standing     Number of Occurrences:   1     Order Specific Question:   Admitting Physician     Answer:   Kelli Almeida [[id number]]     Order Specific Question:   Level of Care     Answer:   Med Surg [16]     Order Specific Question:   Estimated length of stay     Answer:   More than 2 Midnights     Order Specific Question:   Certification     Answer:   I certify that inpatient services are medically necessary for this patient for a duration of greater than two midnights  See H&P and MD Progress Notes for additional information about the patient's course of treatment  ED: Date/Time/Mode of Arrival:   ED Arrival Information     Expected Arrival Acuity Means of Arrival Escorted By Service Admission Type    - 3/21/2018 10:17 Urgent Walk-In Self Surgery-General Urgent    Arrival Complaint    rash          Chief Complaint:   Chief Complaint   Patient presents with    Post-op Problem     EMS reports [facility] staff reported drainage from post op incision site after colectomy  History of Illness:   51-year-old male w/c/o abdominal pain  Patient has history of a colectomy in July 2017    facility staff reported that this morning he had some drainage from his incision  States that  Drainage is dark and smells foul like stool  Noted Urostomy Ileal conduit RLQ    Pe:  Two punctate areas less than 5 mm in his lower midline incision  One is very small and has minimal drainage  The more inferior is slightly bigger and drains succus from his midline with pressure  There is only very faint erythema to the left of the midline incision around these areas of drainage  Abd otherwise soft, NT, ND  He has a large parastomal hernia that is soft and nontender around his ileal conduit      ED Vital Signs:   ED Triage Vitals   Temperature Pulse Respirations Blood Pressure SpO2   03/21/18 1025 03/21/18 1025 03/21/18 1025 03/21/18 1025 03/21/18 1025   97 7 °F (36 5 °C) 86 16 112/64 96 %      Temp Source Heart Rate Source Patient Position - Orthostatic VS BP Location FiO2 (%)   03/21/18 1025 03/21/18 1025 03/21/18 1513 03/21/18 1513 --   Oral Monitor Lying Left arm       Pain Score       03/21/18 1025       No Pain        Wt Readings from Last 1 Encounters:   03/21/18 63 6 kg (140 lb 3 4 oz)     Abnormal Labs/Diagnostic Test Results:  CTAP   Anterior abdominal wall soft tissue likely related to the reported incisional drainage   This was not present on the previous study and is suspicious for acute inflammatory phlegmon   No discrete abscess is identified  2   Right lower quadrant ileal conduit with herniation of right colon into the ostomy, not present previously   No obstruction identified  3   Ileal conduit and right lower quadrant small bowel loops are unchanged      ED Treatment:   Medication Administration from 03/21/2018 1017 to 03/21/2018 1536       Date/Time Order Dose Route Action Action by Comments                03/21/2018 1124 sodium chloride 0 9 % bolus 1,000 mL 1,000 mL Intravenous New Bag Terri Salvador RN                            03/21/2018 1521 enoxaparin (LOVENOX) subcutaneous injection 40 mg 40 mg Subcutaneous Given Vernadine ISIDORO Velez 03/21/2018 1511 metroNIDAZOLE (FLAGYL) IVPB (premix) 500 mg 500 mg Intravenous New Bag Terri Salvador RN           Past Medical/Surgical History: Active Ambulatory Problems     Diagnosis Date Noted    Hypertension 07/21/2017    Cancer ([facility] ) 07/21/2017    Bladder cancer ([facility] ) 07/21/2017    Hyperlipidemia 07/21/2017    Vascular dementia 07/21/2017    SVT (supraventricular tachycardia) ([place] [address] ) 07/21/2017    Colonic mass 07/22/2017    Abdominal abscess ([place] [address] ) 09/03/2017    UTI (urinary tract infection) 01/10/2018    Hypothyroidism 01/10/2018    Sepsis ([place] [address] ) 01/11/2018    Leukocytosis 01/11/2018    Hypokalemia 01/11/2018     Resolved Ambulatory Problems     Diagnosis Date Noted    No Resolved Ambulatory Problems     Past Medical History:   Diagnosis Date    Bladder cancer ([facility] )     Bursitis     Cancer ([place] [address] )     Coronary artery disease     Hard of hearing     Hyperlipidemia     Hypertension     Hypothyroidism 1/10/2018    Memory loss     Myocardial infarction 1987    Prostate CA ([place] [address] )     UTI (urinary tract infection) 1/10/2018    Vascular dementia        Admitting Diagnosis: Rash [R21]  Abdominal pain [R10 9]  Post surgical complication [E32  9XXA]    Assessment/Plan:   s/p right hemicolectomy, repair of parastomal and incisional hernia in July 2017, now with drainage from the midline wound 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      Plan:  -admit under observation  -IV abx  -IV fluids  -wound care- ostomy over wound to quantify drainage  -regular diet for now  -resume home meds  -DVT ppx    Admission Orders:  Admit OBS  Daily wound case  (wet to dry dressing)  Incentive spirometry q 1 hr while awake  PT/OT evals  Up as tolerated /  Up in chair q shift  I/o q shift  Surgical diet    Scheduled Meds:   Current Facility-Administered Medications:  acetaminophen 650 mg Oral Q6H PRN Radha Singh MD    ALPRAZolam 0 25 mg Oral BID PRN Flori Kimbrough PA-C    enoxaparin 40 mg Subcutaneous Daily Crow Berry MD    ferrous sulfate 325 mg Oral Daily With Breakfast Crow Berry MD    lactated ringers 50 mL/hr Intravenous Continuous Crow Berry MD Last Rate: 50 mL/hr (03/21/18 2011)   levofloxacin 500 mg Intravenous Q24H Crow Berry MD Last Rate: 500 mg (03/21/18 2011)   levothyroxine 25 mcg Oral Early Morning Crow Berry MD    metroNIDAZOLE 500 mg Intravenous Q8H Crow Berry MD Last Rate: 500 mg (03/22/18 0555)   ondansetron 4 mg Intravenous Q6H PRN Crow Berry MD      Continuous Infusions:   lactated ringers 50 mL/hr Last Rate: 50 mL/hr (03/21/18 2011)       3/22/2018  Assessment:  Pt is an 79 yo M s/p right hemicolectomy, repair of parastomal and incisional hernia in July 2017, now with drainage from the midline wound 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

## 2018-03-22 NOTE — PLAN OF CARE
PAIN - ADULT     Verbalizes/displays adequate comfort level or baseline comfort level Progressing        SAFETY ADULT     Patient will remain free of falls Progressing

## 2018-03-22 NOTE — OCCUPATIONAL THERAPY NOTE
[address] Evaluation     Patient Name: Behzad Pedersen  HGZBW'U Date: 3/22/2018  Problem List  Patient Active Problem List   Diagnosis    Hypertension    Cancer [facility])    Bl[facility] )    Hyperlipidemia    Vascular dementia    SVT (supraventricular tachycardia) (HCC)    Colonic mass    [facility]    UTI (urinary tract infection)    Hypothyroidism    Sepsis [facility]    Leukocytosis    Hypokalemia    Cellulitis     Past Medical History  Past Medical History:   Diagnosis Date    [facility]     Bursitis     shoulders and back    Cancer [facility] )     bladder, prostate    Coronary artery disease     Hard of hearing     Hyperlipidemia     Hypertension     Hypothyroidism 1/10/2018    Memory loss     Myocardial infarction 1987    Prostate CA ([facility] )     UTI (urinary tract infection) 1/10/2018    Vascular dementia      Past Surgical History  Past Surgical History:   Procedure Laterality Date    CATARACT EXTRACTION      COLONOSCOPY      COLOSTOMY      CYSTECTOMY W/ URETEROILEAL CONDUIT      IN LAP,SURG,COLECTOMY,W/REMVL TERM ILEUM Right 7/21/2017    Procedure: LAPAROSCOPIC ASSISTED COLON RESECITON, RIGHT COLON RESECTION, REPAIR INCISIONAL HERNIA;  Surgeon: Dominic Gan MD;  [facility];  Service: [address] N/A 7/21/2017    Procedure: OPEN REPAIR PARA-STOMAL HERNIA;  Surgeon: Dominic Gan MD;  [facility];  Service: General      03/22/18 1245   Note Type   Note type Eval only   Restrictions/Precautions   Weight Bearing Precautions Per Order No   Other Precautions Multiple lines; Bed Alarm; Restraints;Cognitive; Fall Risk   Pain Assessment   Pain Assessment No/denies pain   Pain Score No Pain   Home Living   Type of Home Other (Comment)  ([place])   Home Layout Performs ADLs on one level   Bathroom Toilet Standard   Bathroom Equipment Grab bars around toilet   [address] Equipment Grab bars  (no AD PTA)   Additional Comments Pt not accurate historian upon evaluation  Pt's wife present during evaluation provided social history  Pt ambulates w/ out AD at baseline, and requires min A w/ ADL's   Prior Function   Level of [place] Needs assistance with IADLs   Lives With Facility staff   Receives Help From Personal care attendant; Family   ADL Assistance Needs assistance  (A w/ bathing, and occ A w/ dressing)   IADLs Needs assistance   Falls in the last 6 months 1 to 4  (1)   Vocational Retired   Comments Per wife, pt needs assistance w/ bathing, and occasional min A w/ dressing  Pt's wife reports that pt is particular about his appearance  Lifestyle   Autonomy Pt ambulates w/ mod I w/ out AD at baseline, and completes ADL's w/ overall min A   Reciprocal Relationships Supportive and involved wife  Pt lives at [facility] and recevies assistance from staff w/ ADL/ IADL   Service to Others Pt is retired psych / socal worker   Intrinsic Gratification Pt enjoys going for walks, visiting w/ his wife, and occasionally going to activities / groups at [facility]  Pt's wife added that he does not actviely particiapte, but enjoys going   ADL   Eating Assistance 6  Modified independent   Eating Deficit Setup   Grooming Assistance 5  Supervision/Setup   Grooming Deficit Setup;Verbal cueing;Supervision/safety   UB Bathing Assistance Unable to assess   LB Bathing Assistance Unable to assess   UB Dressing Assistance 5  Supervision/Setup   UB Dressing Deficit Setup;Verbal cueing;Supervision/safety   LB Dressing Assistance 5  Supervision/Setup   LB Dressing Deficit Setup;Supervision/safety;Verbal cueing   Additional Comments overall S to complete ADL after set- up, increased time  Pt requires VC throughout to sustain attention and consisntently follow directions   Bed Mobility   Supine to Sit 5  Supervision   Additional items Assist x 1;HOB elevated; Increased time required   Sit to Supine Unable to assess   Additional Comments Pt seated OOB in chair at end of eval w/ call bell in reach, chair alarm activate, and wife present   Transfers   Sit to Stand 5  Supervision   Additional items Assist x 1; Increased time required   Stand to Sit 5  Supervision   Additional items Assist x 1; Armrests; Increased time required   Functional Mobility   Functional Mobility 5  Supervision   Additional Comments pt engaged in functional mobilty w/ CS using IV pole; slow  shufflinig  Additional items (IV pole)   Balance   Static Sitting Good   Static Standing Fair   Ambulatory Fair -   Activity Tolerance   Activity Tolerance Patient tolerated treatment well; Other (Comment)  (decreased cognition)   Medical Staff Made Aware spoke to Kinga CARPIO   Nurse Made Aware spoke to Gabrielle CHAUDHRY Assessment   RUE Assessment WFL   LUE Assessment   LUE Assessment WFL   Hand Function   Fine Motor Coordination Functional   Cognition   Overall Cognitive Status Impaired   Arousal/Participation Alert; Cooperative   Attention Attends with cues to redirect   Orientation Level Oriented to person;Disoriented to situation;Disoriented to time;Disoriented to place   Memory Decreased recall of recent events;Decreased short term memory;Decreased recall of biographical information;Decreased long term memory   Following Commands Follows one step commands with increased time or repetition   Comments Identified pt by full name and birthdate  Pt not accurate hsitorian at baseline due to dementia  Pt pleasant, cooperative, and confused  Pt requires cues to sustain attention and consistently recall one step directions   Assessment   Assessment Pt is an 79yo male [facility] 3/21/[facility]  Pt presents w/ cellulitis and singificant PMH impacting his occupational performance including cancer hx, [place], [place], vascular dementia, colon resection, hemicolectomy repair  Pt not accurate historian at baseline due to dementia   Pt's wife present during evaluation provided social history  Pt lives at [facility] and ambulates w/ mod I w/ out AD PTA  Pt requires min A to complete ADL overall, and has private room w/ bathroom  Upon evaluation, pt pleasant, cooperative, but confused  Pt able to report birthdate  Pt requires cues to consistently follow once step directions  Pt benefits from simple goal directed cues  Pt required S to complete bed mobitliy supine to sit at EOB  Pt required S sit to stand from EOB, and CS using IV pole to ambulate w/ in room  Pt completed UBD w/ S and VC's  Pt completing ADL's at / near baseline level of I  From an OT perspective, pt can return to [facility] w/ staff assist when medically stable for discharge from acute care  D/C OT   Goals   Patient Goals none stated   Recommendation   OT Discharge Recommendation (return to PLOF w/ staff A)   OT - OK to Discharge (when medically stable to PLOF w/ staff A)   Barthel Index   Feeding 10   Bathing 0   Grooming Score 0   Dressing Score 5   Bladder Score 0   Bowels Score 0   Toilet Use Score 5   Transfers (Bed/Chair) Score 10   Mobility (Level Surface) Score 10   Stairs Score 0   Barthel Index Score 40   Modified [place] Scale   Modified [place] Scale 3   No acute care OT needs at this time   D/C OT    Imtiaz Ervin OTR/L

## 2018-03-22 NOTE — PROGRESS NOTES
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  -wound manager over midline wound with dark brown fluid ~10ml    Plan:  Diet as tolerated   Home meds  Monitor output of midline wound  Reorientation for confusion   Lovenox/SCDs      Subjective/Objective   Chief Complaint:     Subjective: Confusion and wandering hallways last night  No complaints  Objective:     Blood pressure 111/68, pulse 74, temperature 98 °F (36 7 °C), temperature source Oral, resp  rate 16, weight 63 6 kg (140 lb 3 4 oz), SpO2 96 %  ,Body mass index is 22 63 kg/m²  Intake/Output Summary (Last 24 hours) at 03/22/18 0656  Last data filed at 03/22/18 0630   Gross per 24 hour   Intake             1000 ml   Output             1125 ml   Net             -125 ml       Invasive Devices     Peripheral Intravenous Line            Peripheral IV 03/21/18 Left Forearm less than 1 day          Drain            Urostomy Ileal conduit RLQ -- days                Physical Exam: NAD  Awake, alert  Normal respiratory effort  Soft, NT, ND  Midline wound with ostomy and 10ml dark brown fluid in bag  Ileal conduit pink and functioning  No c/c/e    Lab, Imaging and other studies:  I have personally reviewed pertinent lab results    , CBC:   Lab Results   Component Value Date    WBC 5 66 03/22/2018    HGB 12 1 03/22/2018    HCT 37 1 03/22/2018    MCV 93 03/22/2018     (L) 03/22/2018    MCH 30 4 03/22/2018    MCHC 32 6 03/22/2018    RDW 15 0 03/22/2018    MPV 10 7 03/22/2018   , CMP:   Lab Results   Component Value Date     03/22/2018    K 3 9 03/22/2018     03/22/2018    CO2 24 03/22/2018    ANIONGAP 10 03/22/2018    BUN 15 03/22/2018    CREATININE 1 06 03/22/2018    GLUCOSE 91 03/22/2018    CALCIUM 8 7 03/22/2018    AST 11 03/21/2018    ALT 16 03/21/2018    ALKPHOS 56 03/21/2018    PROT 6 9 03/21/2018    BILITOT 0 50 03/21/2018    EGFR 64 03/22/2018     VTE Pharmacologic Prophylaxis: Enoxaparin (Lovenox)  VTE Mechanical Prophylaxis: sequential compression device

## 2018-03-23 VITALS
TEMPERATURE: 97.5 F | BODY MASS INDEX: 22.63 KG/M2 | DIASTOLIC BLOOD PRESSURE: 71 MMHG | OXYGEN SATURATION: 98 % | SYSTOLIC BLOOD PRESSURE: 120 MMHG | HEART RATE: 76 BPM | WEIGHT: 140.21 LBS | RESPIRATION RATE: 16 BRPM

## 2018-03-23 LAB
ANION GAP SERPL CALCULATED.3IONS-SCNC: 9 MMOL/L (ref 4–13)
BUN SERPL-MCNC: 14 MG/DL (ref 5–25)
CALCIUM SERPL-MCNC: 9 MG/DL (ref 8.3–10.1)
CHLORIDE SERPL-SCNC: 105 MMOL/L (ref 100–108)
CO2 SERPL-SCNC: 26 MMOL/L (ref 21–32)
CREAT SERPL-MCNC: 1.03 MG/DL (ref 0.6–1.3)
GFR SERPL CREATININE-BSD FRML MDRD: 66 ML/MIN/1.73SQ M
GLUCOSE SERPL-MCNC: 93 MG/DL (ref 65–140)
POTASSIUM SERPL-SCNC: 3.7 MMOL/L (ref 3.5–5.3)
SODIUM SERPL-SCNC: 140 MMOL/L (ref 136–145)

## 2018-03-23 PROCEDURE — G8978 MOBILITY CURRENT STATUS: HCPCS

## 2018-03-23 PROCEDURE — 97163 PT EVAL HIGH COMPLEX 45 MIN: CPT

## 2018-03-23 PROCEDURE — 80048 BASIC METABOLIC PNL TOTAL CA: CPT | Performed by: STUDENT IN AN ORGANIZED HEALTH CARE EDUCATION/TRAINING PROGRAM

## 2018-03-23 PROCEDURE — G8979 MOBILITY GOAL STATUS: HCPCS

## 2018-03-23 PROCEDURE — 97116 GAIT TRAINING THERAPY: CPT

## 2018-03-23 RX ORDER — METRONIDAZOLE 250 MG/1
500 TABLET ORAL EVERY 8 HOURS SCHEDULED
Qty: 42 TABLET | Refills: 0 | Status: SHIPPED | OUTPATIENT
Start: 2018-03-23 | End: 2018-03-30

## 2018-03-23 RX ORDER — CEPHALEXIN 500 MG/1
500 CAPSULE ORAL EVERY 8 HOURS SCHEDULED
Qty: 21 CAPSULE | Refills: 0 | Status: SHIPPED | OUTPATIENT
Start: 2018-03-23 | End: 2018-03-30

## 2018-03-23 RX ADMIN — LEVOFLOXACIN 500 MG: 5 INJECTION, SOLUTION INTRAVENOUS at 13:42

## 2018-03-23 RX ADMIN — ALPRAZOLAM 0.25 MG: 0.25 TABLET ORAL at 04:27

## 2018-03-23 RX ADMIN — LEVOTHYROXINE SODIUM 25 MCG: 25 TABLET ORAL at 04:27

## 2018-03-23 RX ADMIN — METRONIDAZOLE 500 MG: 500 INJECTION, SOLUTION INTRAVENOUS at 08:35

## 2018-03-23 RX ADMIN — FERROUS SULFATE TAB 325 MG (65 MG ELEMENTAL FE) 325 MG: 325 (65 FE) TAB at 08:47

## 2018-03-23 RX ADMIN — ENOXAPARIN SODIUM 40 MG: 40 INJECTION SUBCUTANEOUS at 08:47

## 2018-03-23 RX ADMIN — METRONIDAZOLE 500 MG: 500 INJECTION, SOLUTION INTRAVENOUS at 14:43

## 2018-03-23 NOTE — PLAN OF CARE
Problem: PHYSICAL THERAPY ADULT  Goal: Performs mobility at highest level of function for planned discharge setting  See evaluation for individualized goals  Treatment/Interventions: Functional transfer training, LE strengthening/ROM, Therapeutic exercise, Endurance training, Cognitive reorientation, Patient/family training, Equipment eval/education, Bed mobility, Gait training          See flowsheet documentation for full assessment, interventions and recommendations  Outcome: Progressing  Prognosis: Fair  Problem List: Decreased strength, Decreased endurance, Impaired balance, Decreased mobility, Decreased coordination, Decreased cognition, Decreased safety awareness, Decreased skin integrity  Assessment: Therapist introduced use of roller walker during ambulation to improve mobility safety and address physical impairments noted during evaluation  Pt was able to tolerate increased ambulation distance w/ introduction of RW but required the same level of assistance to maintain safety w/ mobility  Pt was noted to have improved quality of gait  pt continued to need standby assist and feedback for safety  Pt continues to be a fall risk  Continued inpatient PT intervention is needed to maximize level of functional independence and progress assistive device use as appropriate  Recommendation: Other (Comment) (PT upon return to [facility])          See flowsheet documentation for full assessment

## 2018-03-23 NOTE — SOCIAL WORK
SHIRIN made aware that Pt is ready for discharge back to [facility]  SHIRIN spoke with Pt's wife at Pt's bedside  She will transport Pt back  SHIRIN spoke to [facility] staff to make them aware Pt's wife will transport back around 4:30pm  CM made Surgery and nurse aware

## 2018-03-23 NOTE — PHYSICAL THERAPY NOTE
PHYSICAL THERAPY EVALUATION NOTE    Patient Name: Ignacio Estrada  DFAGP'B Date: 3/23/2018  AGE:   80 y o  Mrn:   [id number]  ADMIT DX:  Rash [R21]  Abdominal pain [R10 9]  Post surgical complication [R78  9XXA]    Past Medical History:   Diagnosis Date    [facility] [address] [facility]     Bursitis     shoulders and back    Cancer ([facility] [address] )     bladder, prostate    Coronary artery disease     Hard of hearing     Hyperlipidemia     Hypertension     Hypothyroidism 1/10/2018    Memory loss     Myocardial infarction 1987    Prostate CA ([facility] [address] [facility]     UTI (urinary tract infection) 1/10/2018    Vascular dementia      Length Of Stay: 1  PHYSICAL THERAPY EVALUATION :    03/23/18 1331   Pain Assessment   Pain Assessment No/denies pain   Home Living   Type of Home Other (Comment)  [facility]   Additional Comments ambulated w/o assistive device  needed assist w/ ADLs  1 fall in last 6 months  pt was unable to provide history, obtained from previous charting  Prior Function   Comments pt seen supine in bed  agreed to PT eval  denied pain or dizziness  pt needed frequent cues for task focus and safety  Restrictions/Precautions   Other Precautions Impulsive;Cognitive; Bed Alarm;Multiple lines; Fall Risk   General   Additional Pertinent History supine blood pressure 127/74  room air resting pulse ox 97% and 73 BPM, active 97% and 91 BPM     Family/Caregiver Present No   Cognition   Overall Cognitive Status Impaired   Arousal/Participation Cooperative   Attention Attends with cues to redirect   Orientation Level Oriented X4;Disoriented to place; Disoriented to time;Disoriented to situation   Following Commands Follows one step commands with increased time or repetition   Comments pt was identified w/ full name and birth date  also checked ID bracelet     RUE Assessment   RUE Assessment WFL  (3+/5, shoulder 3/5)   LUE Assessment   LUE Assessment (3+/5, shoulder 3/5)   RLE Assessment   RLE Assessment WFL  (4-/5)   LLE Assessment   LLE Assessment WFL  (4-/5)   Coordination   Movements are Fluid and Coordinated 0   Coordination and Movement Description impaired coordination all extremities   Sensation WFL   Light Touch   RLE Light Touch Grossly intact   LLE Light Touch Grossly intact   Bed Mobility   Supine to Sit 5  Supervision   Additional items Bedrails; Increased time required; Impulsive;Verbal cues  (for bedrail use)   Transfers   Sit to Stand 5  Supervision   Additional items Increased time required;Verbal cues  (for LE positioning)   Stand to Sit 5  Supervision   Additional items Verbal cues  (for body positioning)   Ambulation/Elevation   Gait pattern Shuffling;Decreased foot clearance; Inconsistent savanah; Foward flexed; Short stride   Gait Assistance 5  Supervision  (to min assist)   Additional items Assist x 1;Verbal cues; Tactile cues  (for obstacles)   Assistive Device None   Distance 60 feet  (additional not possible due to fatigue)   Balance   Static Sitting Good   Dynamic Sitting Fair -   Static Standing Fair -   Dynamic Standing Fair -   Ambulatory Poor +   Activity Tolerance   Activity Tolerance Patient limited by fatigue   Nurse Made Aware spoke to [address] [facility]   Assessment   Prognosis Fair   Problem List Decreased strength;Decreased endurance; Impaired balance;Decreased mobility; Decreased coordination;Decreased cognition;Decreased safety awareness;Decreased skin integrity   Assessment Pt presents with drainage from his abdominal wound  Pt is s/p right hemicolectomy 7/2017  Dx enterocutaneous fistula  order placed for PT eval and tx, w/ activity order of ambulate patient  pt presents w/ comorbidities of bladder cancer, CAD, hyperlipidemia, HTN, MI, vascular dementia, and cataract extraction and personal factors of decreased cognition, hearing impairments, inability to perform ADLs and advanced age   pt presents w/ weakness, decreased endurance, impaired cognition, impaired balance, gait deviations, impaired hearing, impaired coordination, decreased safety awareness and fall risk  these impairments are evident in findings from physical examination (weakness and impaired coordination), mobility assessment (need for standby to min assist w/ all phases of mobility when usually mobilizing independently, tolerance to only 60 feet of ambulation, need for cuing for mobility technique), and Barthel Index: 40/100  pt needed input for task focus and mobility technique/safety  pt is at risk for falls due to physical and safety awareness deficits  pt's clinical presentation is unstable/unpredictable (evident in impulsive/unsafe behavior, need for assist standby to min assist w/ all phases of mobility when usually mobilizing independently, tolerance to only 60 feet of ambulation, need for input for task focus and mobility technique/safety w/ poor carryover of education received)  pt needs inpatient PT tx to improve mobility deficits  discharge recommendation is for PT at [facility] to reduce fall risk and maximize level of functional independence  Goals   Patient Goals pt did not report goals when asked   STG Expiration Date 04/02/18   Short Term Goal #1 pt will: Increase bilateral LE strength 1/2 grade to facilitate independent mobility, Perform all bed mobility tasks modified independent to decrease fall risk factors, Perform all transfers modified independent to improve independence, Ambulate 300 ft  with least restrictive assistive device modified independent w/o LOB, Increase ambulatory balance 1 grade to decrease risk for falls, Tolerate 3 hr OOB to faciliate upright tolerance and Improve Barthel Index score to 70 or greater to facilitate independence   Plan   Treatment/Interventions Functional transfer training;LE strengthening/ROM; Therapeutic exercise; Endurance training;Cognitive reorientation;Patient/family training;Equipment eval/education; Bed mobility;Gait training   PT Frequency 5x/wk   Recommendation   Recommendation Other (Comment)  (PT upon return to [facility])   Barthel Index   Feeding 10   Bathing 0   Grooming Score 0   Dressing Score 5   Bladder Score 0   Bowels Score 0   Toilet Use Score 5   Transfers (Bed/Chair) Score 10   Mobility (Level Surface) Score 10   Stairs Score 0   Barthel Index Score 40     Skilled PT recommended while in hospital and upon DC to progress pt toward treatment goals       Madeline Gale, PT

## 2018-03-23 NOTE — DISCHARGE SUMMARY
Discharge Summary - Obed Monroy 80 y o  male MRN: [id number]    Unit/Bed#: -01 Encounter: [id number]    Admission Date: 3/21/2018   Discharge Date: 03/23/18    Admitting Diagnosis:   Rash [R21]  Abdominal pain [R10 9]  Post surgical complication [C94  9XXA]    Discharge Diagnoses: EC fistula    Consultations: wound care/ostomy care    Procedures Performed: none    Hospital Course: Steve Bolaños is a 80 y o  male admitted for enterocutaneous fistula  He underwent right hemicolectomy and repair of parastomal hernia in July of 2017  He was doing well until he started draining from his midline incision  He came to the ER because he starting to have brown drainage some from incision  He denies any fevers or chills  CT performed was read as Anterior abdominal wall soft tissue likely related to the reported incisional drainage  This was not present on the previous study and is suspicious for acute inflammatory phlegmon  No discrete abscess is identified  He was admitted to the hospital and given IV antibiotics and ostomy appliance was placed over draining area  The output was monitored and he was tolerating a regular diet so he was discharged in stable condition back to [facility]  Condition at Discharge: fair     Discharge instructions/Information to patient and family:   See after visit summary for information provided to patient and family  Provisions for Follow-Up Care:  See after visit summary for information related to follow-up care and any pertinent home health orders  Disposition: discharged in stable condition to [facility]    Planned Readmission: No    Discharge Statement   I spent 30 minutes discharging the patient  This time was spent on the day of discharge  I had direct contact with the patient on the day of discharge  Additional documentation is required if more than 30 minutes were spent on discharge       Discharge Medications:  See after visit summary for reconciled discharge medications provided to patient and family

## 2018-03-23 NOTE — PLAN OF CARE
Problem: PHYSICAL THERAPY ADULT  Goal: Performs mobility at highest level of function for planned discharge setting  See evaluation for individualized goals  Treatment/Interventions: Functional transfer training, LE strengthening/ROM, Therapeutic exercise, Endurance training, Cognitive reorientation, Patient/family training, Equipment eval/education, Bed mobility, Gait training          See flowsheet documentation for full assessment, interventions and recommendations  Prognosis: Fair  Problem List: Decreased strength, Decreased endurance, Impaired balance, Decreased mobility, Decreased coordination, Decreased cognition, Decreased safety awareness, Decreased skin integrity  Assessment: Pt presents with drainage from his abdominal wound  Pt is s/p right hemicolectomy 7/2017  Dx enterocutaneous fistula  order placed for PT eval and tx, w/ activity order of ambulate patient  pt presents w/ comorbidities of bladder cancer, CAD, hyperlipidemia, HTN, MI, vascular dementia, and cataract extraction and personal factors of decreased cognition, hearing impairments, inability to perform ADLs and advanced age  pt presents w/ weakness, decreased endurance, impaired cognition, impaired balance, gait deviations, impaired hearing, impaired coordination, decreased safety awareness and fall risk  these impairments are evident in findings from physical examination (weakness and impaired coordination), mobility assessment (need for standby to min assist w/ all phases of mobility when usually mobilizing independently, tolerance to only 60 feet of ambulation, need for cuing for mobility technique), and Barthel Index: 40/100  pt needed input for task focus and mobility technique/safety  pt is at risk for falls due to physical and safety awareness deficits   pt's clinical presentation is unstable/unpredictable (evident in impulsive/unsafe behavior, need for assist standby to min assist w/ all phases of mobility when usually mobilizing independently, tolerance to only 60 feet of ambulation, need for input for task focus and mobility technique/safety w/ poor carryover of education received)  pt needs inpatient PT tx to improve mobility deficits  discharge recommendation is for PT at [facility] to reduce fall risk and maximize level of functional independence  Recommendation: Other (Comment) (PT upon return to [facility])          See flowsheet documentation for full assessment

## 2018-03-23 NOTE — PROGRESS NOTES
Progress Note - General Surgery   Therisa May 80 y o  male MRN: [id number]  Unit/Bed#: -01 Encounter: [id number]    Assessment:  Pt is an 81 yo M s/p right hemicolectomy, repair of parastomal and incisional hernia in July 2017, now with drainage from the midline wound 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    Plan:  -cont diet as tolerated   -con't home meds  -con't to monitor output of midline wound via ostomy bag  -Reorientation for confusion   -Lovenox/SCDs  -ok to d/c [facility]      Subjective/Objective   Chief Complaint:     Subjective: no major issues  Denies abd pain    Objective:     Blood pressure 121/72, pulse 76, temperature (!) 97 4 °F (36 3 °C), temperature source Oral, resp  rate 16, weight 63 6 kg (140 lb 3 4 oz), SpO2 96 %  ,Body mass index is 22 63 kg/m²  Intake/Output Summary (Last 24 hours) at 03/23/18 1435  Last data filed at 03/23/18 1033   Gross per 24 hour   Intake              598 ml   Output             1375 ml   Net             -777 ml       Invasive Devices     Peripheral Intravenous Line            Peripheral IV 03/21/18 Left Forearm 1 day    Peripheral IV 03/23/18 Right Antecubital less than 1 day          Drain            Urostomy Ileal conduit RLQ -- days                Physical Exam: NAD  Awake, alert  Normal respiratory effort  Soft, NT, ND  Midline wound with ostomy and 10ml dark brown fluid in bag  Ileal conduit pink and functioning  No c/c/e    Lab, Imaging and other studies:  I have personally reviewed pertinent lab results    , CBC:   No results found for: WBC, HGB, HCT, MCV, PLT, ADJUSTEDWBC, MCH, MCHC, RDW, MPV, NRBC, CMP:   Lab Results   Component Value Date     03/23/2018    K 3 7 03/23/2018     03/23/2018    CO2 26 03/23/2018    ANIONGAP 9 03/23/2018    BUN 14 03/23/2018    CREATININE 1 03 03/23/2018    GLUCOSE 93 03/23/2018    CALCIUM 9 0 03/23/2018    EGFR 66 03/23/2018     VTE Pharmacologic Prophylaxis: Enoxaparin (Lovenox)  VTE Mechanical Prophylaxis: sequential compression device

## 2018-03-23 NOTE — PLAN OF CARE
Problem: DISCHARGE PLANNING - CARE MANAGEMENT  Goal: Discharge to post-acute care or home with appropriate resources  INTERVENTIONS:  - Conduct assessment to determine patient/family and health care team treatment goals, and need for post-acute services based on payer coverage, community resources, and patient preferences, and barriers to discharge  - Address psychosocial, clinical, and financial barriers to discharge as identified in assessment in conjunction with the patient/family and health care team  - Arrange appropriate level of post-acute services according to patients   needs and preference and payer coverage in collaboration with the physician and health care team  - Communicate with and update the patient/family, physician, and health care team regarding progress on the discharge plan  - Arrange appropriate transportation to post-acute venues  Outcome: Completed Date Met: 03/23/18  CM made aware that Pt is ready for discharge back to [facility]  CM spoke with Pt's wife at Pt's bedside  She will transport Pt back  CM spoke to [facility] staff to make them aware Pt's wife will transport back around 4:30pm  CM made Surgery and nurse aware

## 2018-03-23 NOTE — CONSULTS
Progress Note - Wound   Brittany Monroy 80 y o  male MRN: [id number]  Unit/Bed#: -01 Encounter: [id number]      Assessment:  Patient is 80year old male who presents from nursing home with brown drainage from his incision  Admitted with cellulitis  Patient has a history of - bladder CA with conduit, CAD, [place], HLD, HT, MI, UTI and dementia  Wound care consulted for management of enterocutaneous fistula  Patient seen in bed, cooperative with assessment  Confused at baseline but pleasant  Minimal assist of one with care  Continent of bowel, conduit for urine  B/l heels, buttocks and sacrum are intact with no redness or wounds  Recommend offloading of pressure and hydraguard cream     Old healed midline incision noted from surgery last summer  2 oval / round shaped open areas adjacent to one another on the mid abdomen - (area measures to include both openings = 0 5x2 0x?cm), unknown depth, both oozing scant brown liquid effluent  approx 50 ml of liquid noted in ostomy pouch  Ileal conduit and fistula pouch leaking and undermined with effluent and urine at of assessment  Likely, 2nd fistula (closest to conduit was not included in the pouching of the fistula, which leaked an undermined the barriers)  Will need to include both fistulas in the pouching system to isolate  Ileal conduit stoma measures slightly larger than 1 1/2inches side to side approx 1inch top to bottom (oval shaped) and is slightly prolapsed, but is moist and red colored  Mucocutaneous junction is intact and slightly denuded  Apply stoma power to denuded skin  Applied thin layer of stoma paste around the stoma prior to applying 2 piece urostomy pouch  Applied ileal conduit pouch first, and then applied wound manager to fistulas  Skin prep applied to the florencia-fistular skin prior to   Can also use ostomy pouch instead of wound manager  This is a low output fistula <100ml per day   Another option for management of the fistula can be absorptive dressings - calcium alginate and dry dressings TID  New pouches applied  Primary nurse aware of plan of care  One more wound manager left at bedside ( cut size to fit both fistulas) apply skin prep to skin and apply to the skin  Can also use ostomy pouch  Wound care will follow along  Will also recommend other preventative skin care orders as well  Plan:   1  Cleanse florencia-fistular skin with warm water only, pat dry, apply skin prep to the skin,  Cut opening opening of wound manager or ostomy pouch to fit BOTH fistulas, attempting to pull the barrier away from the ileal conduit  Will need to change the conduit pouch with the fistula appliance to due proximity of the pouches  Pouch conduit first  Change barriers every 3 days and PRN  Empty fistula pouch every 4 hours and as needed, especially after meals  Can attach to drainage bag (price) if needed  2  Apply hydraguard to b/l heels, buttocks and sacrum BID and PRN for prevention and protection  3  Apply skin nourishing cream to the entire skin daily for moisture   4  Elevate heels off of bed with pillows to offload   5  Turn and reposition patient every 2 hours   6  Soft care cushion to chair when OOB   7  Wound care will follow along with patient weekly, please call with questions and concerns [phone]    Objective:    Vitals: Blood pressure 121/72, pulse 76, temperature (!) 97 4 °F (36 3 °C), temperature source Oral, resp  rate 16, weight 63 6 kg (140 lb 3 4 oz), SpO2 96 %  ,Body mass index is 22 63 kg/m²  Recommendations written as orders  AVS updated with recommendations as well    Prasad Mackenzie RN BSN

## 2018-03-23 NOTE — DISCHARGE INSTR - OTHER ORDERS
Fistula care: Patient has 2 fistulas, will need to pouch both within one wound manager or ostomy pouch  Cleanse florencia-fistular skin with warm water only, pat dry, apply skin prep to the skin,  Cut opening opening of wound manager or ostomy pouch to fit BOTH fistulas, attempting to pull the barrier away from the ileal conduit  Will need to change the conduit pouch with the fistula appliance to due proximity of the pouches  Pouch conduit first  Change barriers every 3 days and PRN  Empty fistula pouch every 4 hours and as needed, especially after meals  Can attach to drainage bag (price) if needed  This is a low output fistula <100ml per day  Another option for management of the fistula can be absorptive dressings - calcium alginate and dry dressings or foam TID

## 2018-03-23 NOTE — PLAN OF CARE
PAIN - ADULT     Verbalizes/displays adequate comfort level or baseline comfort level Progressing        Potential for Falls     Patient will remain free of falls Progressing        Prexisting or High Potential for Compromised Skin Integrity     Skin integrity is maintained or improved Progressing        SAFETY ADULT     Patient will remain free of falls Progressing

## 2018-03-23 NOTE — PHYSICAL THERAPY NOTE
PHYSICAL THERAPY TREATMENT NOTE    Patient Name: Joshua Sainz  [id number] Date: 3/23/2018     03/23/18 1341   Pain Assessment   Pain Assessment No/denies pain   Restrictions/Precautions   Other Precautions Impulsive;Cognitive; Bed Alarm;Multiple lines; Fall Risk   General   Chart Reviewed Yes   Family/Caregiver Present No   Cognition   Overall Cognitive Status Impaired   Arousal/Participation Cooperative   Attention Attends with cues to redirect   Orientation Level Oriented X4;Disoriented to place; Disoriented to time;Disoriented to situation   Following Commands Follows one step commands with increased time or repetition   Subjective   Subjective pt agreed to PT intervention  Bed Mobility   Sit to Supine 5  Supervision  (pt declined remaining OOB due to fatigue and feeling cold)   Additional items Bedrails; Increased time required;Verbal cues  (for bedrails, body mechanics)   Transfers   Sit to Stand 5  Supervision   Additional items Verbal cues  (for hand placement)   Stand to Sit 5  Supervision   Additional items Verbal cues  (for hand placement)   Ambulation/Elevation   Gait pattern Decreased foot clearance; Foward flexed   Gait Assistance 5  Supervision   Additional items (for walker placement, obstacles managemetn)   Assistive Device Rolling walker   Distance 100, 140 feet w/ standing rest break x 30 seconds  (additional not possible due to fatigue)   Balance   Static Sitting Good   Dynamic Sitting Fair -   Static Standing Fair   Dynamic Standing Fair   Ambulatory Fair -  (w/ roller walker)   Activity Tolerance   Activity Tolerance Patient limited by fatigue   Nurse Made Aware spoke to [address] [place] [address]   Assessment   Prognosis Fair   Problem List Decreased strength;Decreased endurance; Impaired balance;Decreased mobility; Decreased coordination;Decreased cognition;Decreased safety awareness;Decreased skin integrity   Assessment Therapist introduced use of roller walker during ambulation to improve mobility safety and address physical impairments noted during evaluation  Pt was able to tolerate increased ambulation distance w/ introduction of RW but required the same level of assistance to maintain safety w/ mobility  Pt was noted to have improved quality of gait  pt continued to need standby assist and feedback for safety  Pt continues to be a fall risk  Continued inpatient PT intervention is needed to maximize level of functional independence and progress assistive device use as appropriate  Goals   Patient Goals pt did not report goals when asked   STG Expiration Date 04/02/18   Treatment Day 1   Plan   Treatment/Interventions Functional transfer training;LE strengthening/ROM; Therapeutic exercise; Endurance training;Cognitive reorientation;Patient/family training;Equipment eval/education; Bed mobility;Gait training   Progress Progressing toward goals   PT Frequency 5x/wk   Recommendation   Recommendation Other (Comment)  (PT upon return to [address] [place] [address])   Equipment Recommended Other (Comment)  (roller walker)     Skilled inpatient PT recommended while in hospital to progress pt toward treatment goals      Dorcas Acosta, PT

## 2018-03-24 NOTE — PHYSICIAN ADVISOR
Current patient class: Inpatient  The patient is currently on Hospital Day: 3      The patient was admitted to the hospital at [phone] on 3/22/18 for the following diagnosis:  Rash [R21]  Abdominal pain [R10 9]  Post surgical complication [G19  9XXA]       There is documentation in the medical record of an expected length of stay of at least 2 midnights  The patient is therefore expected to satisfy the 2 midnight benchmark and given the 2 midnight presumption is appropriate for INPATIENT ADMISSION  Given this expectation of a satisfying stay, CMS instructs us that the patient is most often appropriate for inpatient admission under part A provided medical necessity is documented in the chart  After review of the relevant documentation, labs, vital signs and test results, the patient is appropriate for INPATIENT ADMISSION  Admission to the hospital as an inpatient is a complex decision making process which requires the practitioner to consider the patients presenting complaint, history and physical examination and all relevant testing  With this in mind, in this case, the patient was deemed appropriate for INPATIENT ADMISSION  After review of the documentation and testing available at the time of the admission I concur with this clinical determination of medical necessity  Rationale is as follows:     The patient is a 80 yrs old Male who presented to the ED at 3/21/2018 10:17 AM with a chief complaint of Post-op Problem (EMS reports [facility] staff reported drainage from post op incision site after colectomy  )    The patients vitals on arrival were ED Triage Vitals   Temperature Pulse Respirations Blood Pressure SpO2   03/21/18 1025 03/21/18 1025 03/21/18 1025 03/21/18 1025 03/21/18 1025   97 7 °F (36 5 °C) 86 16 112/64 96 %      Temp Source Heart Rate Source Patient Position - Orthostatic VS BP Location FiO2 (%)   03/21/18 1025 03/21/18 1025 03/21/18 1513 03/21/18 1513 --   Oral Monitor Lying Left arm       Pain Score       03/21/18 1025       No Pain           Past Medical History:   Diagnosis Date    [facility]     Bursitis     shoulders and back    Cancer ([facility]     bladder, prostate    Coronary artery disease     Hard of hearing     Hyperlipidemia     Hypertension     Hypothyroidism 1/10/2018    Memory loss     Myocardial infarction 1987    Prostate CA ([facility]     UTI (urinary tract infection) 1/10/2018    Vascular dementia      Past Surgical History:   Procedure Laterality Date    CATARACT EXTRACTION      COLONOSCOPY      COLOSTOMY      CYSTECTOMY W/ URETEROILEAL CONDUIT      LA LAP,SURG,COLECTOMY,W/REMVL TERM ILEUM Right 7/21/2017    Procedure: LAPAROSCOPIC ASSISTED COLON RESECITON, RIGHT COLON RESECTION, REPAIR INCISIONAL HERNIA;  Surgeon: Moris Hilario MD;  [facility];  Service: General    LA REPAIR INCISIONAL HERNIA,REDUCIBLE N/A 7/21/2017    Procedure: OPEN REPAIR PARA-STOMAL HERNIA;  Surgeon: Moris Hilario MD;  [facility];  Service: General           Consults have been placed to:   IP CONSULT TO CASE MANAGEMENT  IP CONSULT TO WOUND CARE    Vitals:    03/22/18 1536 03/22/18 2304 03/23/18 0844 03/23/18 1512   BP: 138/83 138/78 121/72 120/71   BP Location: Right arm Left arm Left arm Left arm   Pulse: 75 76 76 76   Resp: 18 18 16 16   Temp: 97 7 °F (36 5 °C) 97 8 °F (36 6 °C) (!) 97 4 °F (36 3 °C) 97 5 °F (36 4 °C)   TempSrc: Oral Oral Oral Oral   SpO2: 99% 97% 96% 98%   Weight:           Most recent labs:    Recent Labs      03/21/18   1052  03/22/18   0614  03/23/18   0415   WBC  7 61  5 66   --    HGB  11 7*  12 1   --    HCT  35 8*  37 1   --    PLT  111*  128*   --    K  3 9  3 9  3 7   NA  137  138  140   CALCIUM  8 6  8 7  9 0   BUN  19  15  14   CREATININE  1 13  1 06  1 03   INR  1 04   --    --    AST  11   --    --    ALT  16   --    --    ALKPHOS  56   --    --    BILITOT  0 50   --    --        Scheduled Meds:  Continuous Infusions:  No current facility-administered medications for this encounter  PRN Meds:      Surgical procedures (if appropriate):

## 2018-03-26 LAB
BACTERIA BLD CULT: NORMAL
BACTERIA BLD CULT: NORMAL

## 2018-04-23 ENCOUNTER — APPOINTMENT (EMERGENCY)
Dept: RADIOLOGY | Facility: HOSPITAL | Age: 83
End: 2018-04-23
Payer: MEDICARE

## 2018-04-23 ENCOUNTER — APPOINTMENT (EMERGENCY)
Dept: CT IMAGING | Facility: HOSPITAL | Age: 83
End: 2018-04-23
Payer: MEDICARE

## 2018-04-23 ENCOUNTER — HOSPITAL ENCOUNTER (EMERGENCY)
Facility: HOSPITAL | Age: 83
Discharge: HOME | End: 2018-04-23
Attending: EMERGENCY MEDICINE | Admitting: EMERGENCY MEDICINE
Payer: MEDICARE

## 2018-04-23 VITALS
WEIGHT: 139.77 LBS | DIASTOLIC BLOOD PRESSURE: 64 MMHG | SYSTOLIC BLOOD PRESSURE: 114 MMHG | OXYGEN SATURATION: 98 % | TEMPERATURE: 98.8 F | BODY MASS INDEX: 22.56 KG/M2 | HEART RATE: 83 BPM | RESPIRATION RATE: 16 BRPM

## 2018-04-23 DIAGNOSIS — R10.9 ABDOMINAL PAIN: Primary | ICD-10-CM

## 2018-04-23 DIAGNOSIS — L98.8 FISTULA: ICD-10-CM

## 2018-04-23 LAB
ALBUMIN SERPL BCP-MCNC: 3.2 G/DL (ref 3.5–5)
ALP SERPL-CCNC: 55 U/L (ref 46–116)
ALT SERPL W P-5'-P-CCNC: 18 U/L (ref 12–78)
ANION GAP SERPL CALCULATED.3IONS-SCNC: 9 MMOL/L (ref 4–13)
AST SERPL W P-5'-P-CCNC: 12 U/L (ref 5–45)
BACTERIA UR QL AUTO: ABNORMAL /HPF
BASOPHILS # BLD AUTO: 0.01 THOUSANDS/ΜL (ref 0–0.1)
BASOPHILS NFR BLD AUTO: 0 % (ref 0–1)
BILIRUB SERPL-MCNC: 0.6 MG/DL (ref 0.2–1)
BILIRUB UR QL STRIP: NEGATIVE
BUN SERPL-MCNC: 12 MG/DL (ref 5–25)
CALCIUM SERPL-MCNC: 9 MG/DL (ref 8.3–10.1)
CHLORIDE SERPL-SCNC: 102 MMOL/L (ref 100–108)
CLARITY UR: ABNORMAL
CO2 SERPL-SCNC: 28 MMOL/L (ref 21–32)
COLOR UR: YELLOW
CREAT SERPL-MCNC: 0.91 MG/DL (ref 0.6–1.3)
EOSINOPHIL # BLD AUTO: 0.02 THOUSAND/ΜL (ref 0–0.61)
EOSINOPHIL NFR BLD AUTO: 0 % (ref 0–6)
ERYTHROCYTE [DISTWIDTH] IN BLOOD BY AUTOMATED COUNT: 13.8 % (ref 11.6–15.1)
GFR SERPL CREATININE-BSD FRML MDRD: 77 ML/MIN/1.73SQ M
GLUCOSE SERPL-MCNC: 137 MG/DL (ref 65–140)
GLUCOSE UR STRIP-MCNC: NEGATIVE MG/DL
HCT VFR BLD AUTO: 38.4 % (ref 36.5–49.3)
HGB BLD-MCNC: 12.2 G/DL (ref 12–17)
HGB UR QL STRIP.AUTO: ABNORMAL
INR PPP: 0.97 (ref 0.86–1.16)
KETONES UR STRIP-MCNC: NEGATIVE MG/DL
LACTATE SERPL-SCNC: 1.4 MMOL/L (ref 0.5–2)
LEUKOCYTE ESTERASE UR QL STRIP: NEGATIVE
LYMPHOCYTES # BLD AUTO: 0.37 THOUSANDS/ΜL (ref 0.6–4.47)
LYMPHOCYTES NFR BLD AUTO: 7 % (ref 14–44)
MCH RBC QN AUTO: 30.6 PG (ref 26.8–34.3)
MCHC RBC AUTO-ENTMCNC: 31.8 G/DL (ref 31.4–37.4)
MCV RBC AUTO: 96 FL (ref 82–98)
MONOCYTES # BLD AUTO: 0.36 THOUSAND/ΜL (ref 0.17–1.22)
MONOCYTES NFR BLD AUTO: 7 % (ref 4–12)
NEUTROPHILS # BLD AUTO: 4.29 THOUSANDS/ΜL (ref 1.85–7.62)
NEUTS SEG NFR BLD AUTO: 86 % (ref 43–75)
NITRITE UR QL STRIP: NEGATIVE
NON-SQ EPI CELLS URNS QL MICRO: ABNORMAL /HPF
PH UR STRIP.AUTO: 6.5 [PH] (ref 4.5–8)
PLATELET # BLD AUTO: 114 THOUSANDS/UL (ref 149–390)
PMV BLD AUTO: 10.2 FL (ref 8.9–12.7)
POTASSIUM SERPL-SCNC: 4.1 MMOL/L (ref 3.5–5.3)
PROT SERPL-MCNC: 7.2 G/DL (ref 6.4–8.2)
PROT UR STRIP-MCNC: ABNORMAL MG/DL
PROTHROMBIN TIME: 13.2 SECONDS (ref 12.1–14.4)
RBC # BLD AUTO: 3.99 MILLION/UL (ref 3.88–5.62)
RBC #/AREA URNS AUTO: ABNORMAL /HPF
SODIUM SERPL-SCNC: 139 MMOL/L (ref 136–145)
SP GR UR STRIP.AUTO: 1.02 (ref 1–1.03)
UROBILINOGEN UR QL STRIP.AUTO: 0.2 E.U./DL
WBC # BLD AUTO: 5.05 THOUSAND/UL (ref 4.31–10.16)
WBC #/AREA URNS AUTO: ABNORMAL /HPF

## 2018-04-23 PROCEDURE — 87086 URINE CULTURE/COLONY COUNT: CPT

## 2018-04-23 PROCEDURE — 74177 CT ABD & PELVIS W/CONTRAST: CPT

## 2018-04-23 PROCEDURE — 96361 HYDRATE IV INFUSION ADD-ON: CPT

## 2018-04-23 PROCEDURE — 99284 EMERGENCY DEPT VISIT MOD MDM: CPT

## 2018-04-23 PROCEDURE — 96374 THER/PROPH/DIAG INJ IV PUSH: CPT

## 2018-04-23 PROCEDURE — 85025 COMPLETE CBC W/AUTO DIFF WBC: CPT | Performed by: EMERGENCY MEDICINE

## 2018-04-23 PROCEDURE — 85610 PROTHROMBIN TIME: CPT | Performed by: EMERGENCY MEDICINE

## 2018-04-23 PROCEDURE — 83605 ASSAY OF LACTIC ACID: CPT | Performed by: EMERGENCY MEDICINE

## 2018-04-23 PROCEDURE — 87077 CULTURE AEROBIC IDENTIFY: CPT

## 2018-04-23 PROCEDURE — 81001 URINALYSIS AUTO W/SCOPE: CPT

## 2018-04-23 PROCEDURE — 36415 COLL VENOUS BLD VENIPUNCTURE: CPT | Performed by: EMERGENCY MEDICINE

## 2018-04-23 PROCEDURE — 71046 X-RAY EXAM CHEST 2 VIEWS: CPT

## 2018-04-23 PROCEDURE — 80053 COMPREHEN METABOLIC PANEL: CPT | Performed by: EMERGENCY MEDICINE

## 2018-04-23 PROCEDURE — 87186 SC STD MICRODIL/AGAR DIL: CPT

## 2018-04-23 RX ORDER — LORAZEPAM 2 MG/ML
0.5 INJECTION INTRAMUSCULAR ONCE
Status: COMPLETED | OUTPATIENT
Start: 2018-04-23 | End: 2018-04-23

## 2018-04-23 RX ADMIN — LORAZEPAM 0.5 MG: 2 INJECTION INTRAMUSCULAR; INTRAVENOUS at 11:13

## 2018-04-23 RX ADMIN — SODIUM CHLORIDE 500 ML: 0.9 INJECTION, SOLUTION INTRAVENOUS at 11:01

## 2018-04-23 RX ADMIN — IOHEXOL 100 ML: 350 INJECTION, SOLUTION INTRAVENOUS at 11:41

## 2018-04-23 NOTE — CONSULTS
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; no signs of systemic illness with normal WBC and no surrounding cellulitis  Ostomy appliance placed on wound    Plan:  -Ok to discharge back to [facility] with ostomy appliance  -follow up in office  -come to ER if fevers > 101, inc erythema, or excessive ostomy output (>400 cc/day)    History of Present Illness   History, ROS and PFSH unobtainable from any source due to n/a  HPI:  Adriana Best is a 80 y o  male who presents with drainage from midline wound  History entirely from wife as pt is severely demented  Per wife, pt started to have drainage from his midline wound starting yesterday  Today the nursing facility reported fever so he was brought to the ER  Currently he is afebrile and not in any acute distress  He does not complain of any pain at this time  Of note, he was admitted about a month ago with a similar issue and ultimately discharged with minimal drainage from the wound      Consults    Review of Systems   Unable to perform ROS: Dementia       Historical Information   Past Medical History:   Diagnosis Date    Bladder [facility]     Bursitis     shoulders and back    Cancer ([facility]     bladder, prostate    Coronary artery disease     Hard of hearing     Hyperlipidemia     Hypertension     Hypothyroidism 1/10/2018    Memory loss     Myocardial infarction ([facility] ) 1987    Prostate CA ([facility]     UTI (urinary tract infection) 1/10/2018    Vascular dementia      Past Surgical History:   Procedure Laterality Date    CATARACT EXTRACTION      COLONOSCOPY      COLOSTOMY      CYSTECTOMY W/ URETEROILEAL CONDUIT      HI LAP,SURG,COLECTOMY,W/REMVL TERM ILEUM Right 7/21/2017    Procedure: LAPAROSCOPIC ASSISTED COLON RESECITON, RIGHT COLON RESECTION, REPAIR INCISIONAL HERNIA;  Surgeon: Glory Milian MD; [facility];  Service: General    MA REPAIR INCISIONAL HERNIA,REDUCIBLE N/A 7/21/2017    Procedure: OPEN REPAIR PARA-STOMAL HERNIA;  Surgeon: Jose Francisco Benson MD;  [facility];  Service: General     Social History   History   Alcohol Use No     History   Drug Use No     History   Smoking Status    Former Smoker    Quit date: 1987   Smokeless Tobacco    Former User     Family History: History reviewed  No pertinent family history  Meds/Allergies   current meds:   No current facility-administered medications for this encounter  and PTA meds:   Prior to Admission Medications   Prescriptions Last Dose Informant Patient Reported? Taking?    ALPRAZolam (XANAX) 0 25 mg tablet   Yes No   Sig: Take 0 25 mg by mouth 2 (two) times a day as needed for anxiety   Multiple Vitamin (MULTIVITAMIN) tablet   Yes No   Sig: Take 1 tablet by mouth daily   acetaminophen (TYLENOL) 325 mg tablet   Yes No   Sig: Take 650 mg by mouth every 6 (six) hours as needed for mild pain   cholecalciferol (VITAMIN D3) 1,000 units tablet   Yes No   Sig: Take 1,000 Units by mouth daily   cyanocobalamin (VITAMIN B-12) 1,000 mcg tablet   Yes No   Sig: Take by mouth daily   ferrous sulfate 325 (65 Fe) mg tablet   Yes No   Sig: Take 325 mg by mouth daily with breakfast   levothyroxine 25 mcg tablet   Yes No   Sig: Take 25 mcg by mouth daily      Facility-Administered Medications: None     No Known Allergies    Objective   First Vitals:   Blood Pressure: 122/93 (04/23/18 1054)  Pulse: 82 (04/23/18 1054)  Temperature: 98 8 °F (37 1 °C) (04/23/18 1054)  Temp Source: Oral (04/23/18 1054)  Respirations: 18 (04/23/18 1054)  Weight - Scale: 63 4 kg (139 lb 12 4 oz) (04/23/18 1054)  SpO2: 100 % (04/23/18 1054)    Current Vitals:   Blood Pressure: 127/67 (04/23/18 1300)  Pulse: 76 (04/23/18 1300)  Temperature: 98 8 °F (37 1 °C) (04/23/18 1054)  Temp Source: Oral (04/23/18 1054)  Respirations: 16 (04/23/18 1300)  Weight - Scale: 63 4 kg (139 lb 12 4 oz) (04/23/18 1054)  SpO2: 99 % (04/23/18 1300)    No intake or output data in the 24 hours ending 04/23/18 1450    Invasive Devices     Peripheral Intravenous Line            Peripheral IV 04/23/18 Left Antecubital less than 1 day          Drain            Urostomy Ileal conduit RLQ -- days                Physical Exam   Constitutional: He appears well-developed and well-nourished  HENT:   Head: Normocephalic and atraumatic  Eyes: Conjunctivae are normal  Pupils are equal, round, and reactive to light  Neck: Normal range of motion  Neck supple  Cardiovascular: Normal rate and regular rhythm  Pulmonary/Chest: Effort normal and breath sounds normal    Abdominal: Soft  Ileal conduit in place, pink  Midline wound with two points where there is feculant/bilious drainage and a small amount of surrounding excoriation around the wound  Abd otherwise soft, NT, ND   Musculoskeletal: Normal range of motion  Neurological: He is alert  Skin: Skin is warm and dry     Psychiatric:   Severely confused, not oriented to place or time       Lab Results:   CBC:   Lab Results   Component Value Date    WBC 5 05 04/23/2018    HGB 12 2 04/23/2018    HCT 38 4 04/23/2018    MCV 96 04/23/2018     (L) 04/23/2018    MCH 30 6 04/23/2018    MCHC 31 8 04/23/2018    RDW 13 8 04/23/2018    MPV 10 2 04/23/2018   , CMP:   Lab Results   Component Value Date     04/23/2018    K 4 1 04/23/2018     04/23/2018    CO2 28 04/23/2018    ANIONGAP 9 04/23/2018    BUN 12 04/23/2018    CREATININE 0 91 04/23/2018    GLUCOSE 137 04/23/2018    CALCIUM 9 0 04/23/2018    AST 12 04/23/2018    ALT 18 04/23/2018    ALKPHOS 55 04/23/2018    PROT 7 2 04/23/2018    BILITOT 0 60 04/23/2018    EGFR 77 04/23/2018   , Coagulation:   Lab Results   Component Value Date    INR 0 97 04/23/2018   , Urinalysis:   Lab Results   Component Value Date    COLORU Yellow 04/23/2018    CLARITYU Slightly Cloudy 04/23/2018    SPECGRAV 1 020 04/23/2018 PHUR 6 5 04/23/2018    LEUKOCYTESUR Negative 04/23/2018    NITRITE Negative 04/23/2018    PROTEINUA 30 (1+) (A) 04/23/2018    GLUCOSEU Negative 04/23/2018    KETONESU Negative 04/23/2018    BILIRUBINUR Negative 04/23/2018    BLOODU Small (A) 04/23/2018   , Amylase: No results found for: AMYLASE, Lipase: No results found for: LIPASE  Imaging: I have personally reviewed pertinent reports  and I have personally reviewed pertinent films in PACS  EKG, Pathology, and Other Studies: I have personally reviewed pertinent reports  and I have personally reviewed pertinent films in PACS    Counseling / Coordination of Care  Total floor / unit time spent today 40 minutes  Greater than 50% of total time was spent with the patient and / or family counseling and / or coordination of care

## 2018-04-23 NOTE — DISCHARGE INSTRUCTIONS
Supplies provided to you may be used at the fistula site  Chronic Wounds   WHAT YOU NEED TO KNOW:   A chronic wound is a wound that does not heal completely in 6 weeks  A wound is an injury that causes a break in the skin  There may also be damage to nearby tissues  Examples of wounds that can become chronic are deep ulcers (open sores), large burns, and infected cuts  DISCHARGE INSTRUCTIONS:   Contact your healthcare provider if:   · You have a fever  · You have increased or new pain, swelling, redness, or bleeding in or around your wound  · You have pus or a foul odor coming from your wound  · Your skin itches or has a rash  · You have open sores, blisters, or changes in the color or temperature of your skin  · You have questions or concerns about your condition or care  Medicines:   · NSAIDs , such as ibuprofen, help decrease swelling, pain, and fever  This medicine is available with or without a doctor's order  NSAIDs can cause stomach bleeding or kidney problems in certain people  If you take blood thinner medicine, always ask if NSAIDs are safe for you  Always read the medicine label and follow directions  Do not give these medicines to children under 10months of age without direction from your child's healthcare provider  · Acetaminophen  decreases pain and fever  It is available without a doctor's order  Ask how much to take and how often to take it  Follow directions  Read the labels of all other medicines you are using to see if they also contain acetaminophen, or ask your doctor or pharmacist  Acetaminophen can cause liver damage if not taken correctly  Do not use more than 4 grams (4,000 milligrams) total of acetaminophen in one day  · Antibiotics  may be given to prevent or treat an infection caused by bacteria  · Take your medicine as directed  Contact your healthcare provider if you think your medicine is not helping or if you have side effects   Tell him or her if you are allergic to any medicine  Keep a list of the medicines, vitamins, and herbs you take  Include the amounts, and when and why you take them  Bring the list or the pill bottles to follow-up visits  Carry your medicine list with you in case of an emergency  Follow up with your healthcare provider as directed: You need to return to have your wound checked  You may also need to have the bandage changed  Write down your questions so you remember to ask them during your visits  What you need to know about wound care:   · Wash your hands before and after you take care of your wound  · Keep the bandage clean and dry  Do not stop using the bandage on your wound unless your healthcare provider says it is okay  · Clean the wound and change the dressing as often as directed by your healthcare provider  Eat healthy foods and drink liquids as directed:  Healthy foods give your body the nutrients it needs to heal your wound  Liquids prevent dehydration that can decrease the blood supply to your wound  Healthy foods include fruits, vegetables, grains (breads and cereals), dairy, and protein foods  Protein foods include meat, fish, nuts, and soy products  Protein, calories, vitamin C, and zinc help wounds heal  Ask for more information about the foods you should eat to improve healing  Do not smoke: If you smoke, it is never too late to quit  Smoking delays wound healing  Smoking also increases your risk for infection after surgery  Ask your healthcare provider for information if you need help quitting  Prevent pressure wounds:  Pressure wounds can develop when blood flow to an area is blocked  For example, you sit or lie in the same position without moving and put pressure on your heels  You can prevent pressure wounds by doing any of the following:  · Change your position every 15 minutes while you are sitting  · Change your position every 2 hours while you lie in bed      · Prop your legs on pillows to lift your heels while you are lying down  · Check your skin or have someone else check your skin daily  Check the areas that are common to pressure wounds, such as elbows, heels, and buttocks  Common early signs of pressure wounds are open sores, blisters, or changes in color or temperature  © 2017 [employer] Information is for End User's use only and may not be sold, redistributed or otherwise used for commercial purposes  All illustrations and images included in CareNotes® are the copyrighted property of [employer]  or [employer]  The above information is an  only  It is not intended as medical advice for individual conditions or treatments  Talk to your doctor, nurse or pharmacist before following any medical regimen to see if it is safe and effective for you

## 2018-04-23 NOTE — ED PROVIDER NOTES
History  Chief Complaint   Patient presents with    Fever - 75 years or older     pt sent here with fever and some abd tenderness  59-year-old male presents to the emergency department from his nursing center [facility] for report of low-grade fevers  Temperature was reported to have been 99 6  Unclear duration of temperature elevation  Patient has dementia and is unable to contribute much to history  On examination he does relate that it hurts upon palpation of the lower abdomen  He denies having been coughing or having had any nausea or vomiting  Recent discharge summary from the end of March reviewed  It is noted that patient had undergone hemicolectomy and parastomal hernia repair in the summer of 2017  He had been doing well until the March visit when there had been drainage from his midline abdominal incision  He was diagnosed with enterocutaneous fistula  He was treated with antibiotics for associated phlegmon and had an appliance applied to the area to the area to catch drainage  Further prior notes reviewed  Patient with history of both bladder and colon cancer  He does have an ileal conduit  Prior to Admission Medications   Prescriptions Last Dose Informant Patient Reported? Taking?    ALPRAZolam (XANAX) 0 25 mg tablet   Yes No   Sig: Take 0 25 mg by mouth 2 (two) times a day as needed for anxiety   Multiple Vitamin (MULTIVITAMIN) tablet   Yes No   Sig: Take 1 tablet by mouth daily   acetaminophen (TYLENOL) 325 mg tablet   Yes No   Sig: Take 650 mg by mouth every 6 (six) hours as needed for mild pain   cholecalciferol (VITAMIN D3) 1,000 units tablet   Yes No   Sig: Take 1,000 Units by mouth daily   cyanocobalamin (VITAMIN B-12) 1,000 mcg tablet   Yes No   Sig: Take by mouth daily   ferrous sulfate 325 (65 Fe) mg tablet   Yes No   Sig: Take 325 mg by mouth daily with breakfast   levothyroxine 25 mcg tablet   Yes No   Sig: Take 25 mcg by mouth daily Facility-Administered Medications: None       Past Medical History:   Diagnosis Date    [facility]adder [facility] ([facility] )     Bursitis     shoulders and back    Cancer ([facility] )     bladder, prostate    Coronary artery disease     Hard of hearing     Hyperlipidemia     Hypertension     Hypothyroidism 1/10/2018    Memory loss     Myocardial infarction ([facility] ) 1987    Prostate CA ([facility] )     UTI (urinary tract infection) 1/10/2018    Vascular dementia        Past Surgical History:   Procedure Laterality Date    CATARACT EXTRACTION      COLONOSCOPY      COLOSTOMY      CYSTECTOMY W/ URETEROILEAL CONDUIT      [place] LAP,SURG,COLECTOMY,W/REMVL TERM ILEUM Right 7/21/2017    Procedure: LAPAROSCOPIC ASSISTED COLON RESECITON, RIGHT COLON RESECTION, REPAIR INCISIONAL HERNIA;  Surgeon: Kieran Isabel MD;  [facility];  Service: General    [address] [place] [address] N/A 7/21/2017    Procedure: OPEN REPAIR PARA-STOMAL HERNIA;  Surgeon: Kieran Isabel MD;  [facility];  Service: General       History reviewed  No pertinent family history  I have reviewed and agree with the history as documented  Social History   Substance Use Topics    Smoking status: Former Smoker     Quit date: 1987    Smokeless tobacco: Former User    Alcohol use No        Review of Systems   All other systems reviewed and are negative  Physical Exam  ED Triage Vitals [04/23/18 1054]   Temperature Pulse Respirations Blood Pressure SpO2   98 8 °F (37 1 °C) 82 18 122/93 100 %      Temp Source Heart Rate Source Patient Position - Orthostatic VS BP Location FiO2 (%)   Oral Monitor Sitting Right arm --      Pain Score       No Pain           Orthostatic Vital Signs  Vitals:    04/23/18 1200 04/23/18 1230 04/23/18 1300 04/23/18 1459   BP: 151/63 119/67 127/67 114/64   Pulse: 74 78 76 83   Patient Position - Orthostatic VS:    Lying       Physical Exam   Constitutional: He appears well-developed and well-nourished     Eyes: Conjunctivae and EOM are normal  No scleral icterus  Cardiovascular: Normal rate and regular rhythm  Pulmonary/Chest: Effort normal and breath sounds normal  No respiratory distress  Abdominal: Soft  Bowel sounds are normal    Right-sided ostomy without surrounding erythema  Translucent collective device over this w/ clear, light urine present  Opaque collective device over the central abdomen  Patient tender in the suprapubic region beneath this device  Thin feculent material present in this ostomy bag  There is a small amount of stool leaking beneath the R side of this bandage  No CVA tenderness  Musculoskeletal: Normal range of motion  Neurological: He is alert  No cranial nerve deficit  Skin: Skin is warm and dry  Nursing note and vitals reviewed  ED Medications  Medications   sodium chloride 0 9 % bolus 500 mL (0 mL Intravenous Stopped 4/23/18 1208)   LORazepam (ATIVAN) 2 mg/mL injection 0 5 mg (0 5 mg Intravenous Given 4/23/18 1113)   iohexol (OMNIPAQUE) 350 MG/ML injection (MULTI-DOSE) 100 mL (100 mL Intravenous Given 4/23/18 1141)       Diagnostic Studies  Results Reviewed     Procedure Component Value Units Date/Time    Urine Microscopic [[id number]]  (Abnormal) Collected:  04/23/18 1204    Lab Status:  Final result Specimen:  Urine Updated:  04/23/18 1233     RBC, UA 1-2 (A) /hpf      WBC, UA 20-30 (A) /hpf      Epithelial Cells Occasional /hpf      Bacteria, UA Occasional /hpf     Urine culture [[id number]] Collected:  04/23/18 1204    Lab Status:   In process Specimen:  Urine Updated:  04/23/18 1233    ED Urine Macroscopic [[id number]]  (Abnormal) Collected:  04/23/18 1204    Lab Status:  Final result Specimen:  Urine Updated:  04/23/18 1202     Color, UA Yellow     Clarity, UA Slightly Cloudy     pH, UA 6 5     Leukocytes, UA Negative     Nitrite, UA Negative     Protein, UA 30 (1+) (A) mg/dl      Glucose, UA Negative mg/dl      Ketones, UA Negative mg/dl      Urobilinogen, UA 0 2 E U /dl Bilirubin, UA Negative     Blood, UA Small (A)     Specific [place], UA 1 020    Narrative:       CLINITEK RESULT    Lactic acid, plasma [[id number]]  (Normal) Collected:  04/23/18 1059    Lab Status:  Final result Specimen:  Blood from Arm, Left Updated:  04/23/18 1127     LACTIC ACID 1 4 mmol/L     Narrative:         Result may be elevated if tourniquet was used during collection  Comprehensive metabolic panel [[id number]]  (Abnormal) Collected:  04/23/18 1059    Lab Status:  Final result Specimen:  Blood from Arm, Left Updated:  04/23/18 1125     Sodium 139 mmol/L      Potassium 4 1 mmol/L      Chloride 102 mmol/L      CO2 28 mmol/L      Anion Gap 9 mmol/L      BUN 12 mg/dL      Creatinine 0 91 mg/dL      Glucose 137 mg/dL      Calcium 9 0 mg/dL      AST 12 U/L      ALT 18 U/L      Alkaline Phosphatase 55 U/L      Total Protein 7 2 g/dL      Albumin 3 2 (L) g/dL      Total Bilirubin 0 60 mg/dL      eGFR 77 ml/min/1 73sq m     Narrative:         National Kidney Disease Education Program recommendations are as follows:  GFR calculation is accurate only with a steady state creatinine  Chronic Kidney disease less than 60 ml/min/1 73 sq  meters  Kidney failure less than 15 ml/min/1 73 sq  meters      Protime-INR [[id number]]  (Normal) Collected:  04/23/18 1059    Lab Status:  Final result Specimen:  Blood from Arm, Left Updated:  04/23/18 1118     Protime 13 2 seconds      INR 0 97    CBC and differential [[id number]]  (Abnormal) Collected:  04/23/18 1059    Lab Status:  Final result Specimen:  Blood from Arm, Left Updated:  04/23/18 1112     WBC 5 05 Thousand/uL      RBC 3 99 Million/uL      Hemoglobin 12 2 g/dL      Hematocrit 38 4 %      MCV 96 fL      MCH 30 6 pg      MCHC 31 8 g/dL      RDW 13 8 %      MPV 10 2 fL      Platelets 646 (L) Thousands/uL      Neutrophils Relative 86 (H) %      Lymphocytes Relative 7 (L) %      Monocytes Relative 7 %      Eosinophils Relative 0 %      Basophils Relative 0 %      Neutrophils Absolute 4 29 Thousands/µL      Lymphocytes Absolute 0 37 (L) Thousands/µL      Monocytes Absolute 0 36 Thousand/µL      Eosinophils Absolute 0 02 Thousand/µL      Basophils Absolute 0 01 Thousands/µL                  CT abdomen pelvis with contrast   Final Result by Radames Eckert MD (04/23 1201)      Overall, no significant interval change from prior study other than perhaps slight decrease in the size of the soft tissue density/phlegmonous change of the abdominal wall which may represent an enterocutaneous fistula based on provided history  No significant new abnormalities are appreciated at this time  Workstation performed: [id number]         XR chest 2 views   Final Result by Cathlean Primrose, DO (04/23 1152)      No acute cardiopulmonary disease  Workstation performed: [id number]                    Procedures  Procedures       Phone Contacts  ED Phone Contact    ED Course     ED Course as of Apr 23 2354 Mon Apr 23, 2018   1306 Spoke with wife and subsequently surgical resident who will be down to evaluate patient  Wife relays that drainage from pt's surgical incision restarted yesterday for the 1st time since last hospitalization  She additionally notes pt  Reported lower abdominal pain and yesterday throat discomfort (which is no longer present today)  She has not noticed any other changes in his appearance/ behaviors  Surgical team to evaluate  [address] [place] [address] resident Dr Dulce Maria Shafer has evaluated patient  He is familiar w/ pt  From last hospitalization  Fistula chronic  He would like patient to be seen by wound care with regard to fashioning a different ostomy bandage around the site due to leaking over toward the urostomy  Nursing staff will notify wound care staff  Consult will be placed  Patient will be discharged back to his nursing facility  No antibiotics or other change in treatment needed                                BEKA Jimenez Time    Disposition  Final diagnoses:   Abdominal pain   Fistula     Time reflects when diagnosis was documented in both MDM as applicable and the Disposition within this note     Time User Action Codes Description Comment    4/23/2018  1:07 PM Wendy FERRARI Add [R10 9] Abdominal pain     4/23/2018  1:07 PM Wendy FERRARI Add [L98 8] Fistula       ED Disposition     ED Disposition Condition Comment    Discharge  Pema Myles 42 discharge to home/self care  Condition at discharge: Good        Follow-up Information     Follow up With Specialties Details Why Kevon Harding MD Internal Medicine   [address] [facility] [address]  [phone]          Discharge Medication List as of 4/23/2018  3:28 PM      CONTINUE these medications which have NOT CHANGED    Details   acetaminophen (TYLENOL) 325 mg tablet Take 650 mg by mouth every 6 (six) hours as needed for mild pain, Historical Med      ALPRAZolam (XANAX) 0 25 mg tablet Take 0 25 mg by mouth 2 (two) times a day as needed for anxiety, Historical Med      cholecalciferol (VITAMIN D3) 1,000 units tablet Take 1,000 Units by mouth daily, Historical Med      cyanocobalamin (VITAMIN B-12) 1,000 mcg tablet Take by mouth daily, Historical Med      ferrous sulfate 325 (65 Fe) mg tablet Take 325 mg by mouth daily with breakfast, Historical Med      levothyroxine 25 mcg tablet Take 25 mcg by mouth daily, Historical Med      Multiple Vitamin (MULTIVITAMIN) tablet Take 1 tablet by mouth daily, Historical Med           No discharge procedures on file      ED Provider  Electronically Signed by           Laury Tian MD  04/24/18 0001

## 2018-04-26 LAB
BACTERIA UR CULT: ABNORMAL
BACTERIA UR CULT: ABNORMAL

## 2018-06-01 DIAGNOSIS — D49.0 NEOPLASM OF DIGESTIVE SYSTEM: ICD-10-CM

## 2018-06-01 DIAGNOSIS — C18.0 MALIGNANT NEOPLASM OF CECUM (HCC): ICD-10-CM

## 2018-06-07 DIAGNOSIS — Z85.46 PERSONAL HISTORY OF MALIGNANT NEOPLASM OF PROSTATE: ICD-10-CM

## 2018-06-07 DIAGNOSIS — C67.9 MALIGNANT NEOPLASM OF BLADDER (HCC): ICD-10-CM

## 2018-06-07 DIAGNOSIS — C61 MALIGNANT NEOPLASM OF PROSTATE (HCC): ICD-10-CM

## 2018-06-22 ENCOUNTER — HOSPITAL ENCOUNTER (OUTPATIENT)
Dept: ULTRASOUND IMAGING | Facility: HOSPITAL | Age: 83
Discharge: HOME | End: 2018-06-22
Payer: MEDICARE

## 2018-06-22 DIAGNOSIS — C67.9 MALIGNANT NEOPLASM OF BLADDER (HCC): ICD-10-CM

## 2018-06-22 PROCEDURE — 76770 US EXAM ABDO BACK WALL COMP: CPT

## 2018-06-26 ENCOUNTER — OFFICE VISIT (OUTPATIENT)
Dept: UROLOGY | Facility: CLINIC | Age: 83
End: 2018-06-26
Payer: MEDICARE

## 2018-06-26 VITALS
BODY MASS INDEX: 21.19 KG/M2 | HEART RATE: 66 BPM | DIASTOLIC BLOOD PRESSURE: 62 MMHG | HEIGHT: 67 IN | SYSTOLIC BLOOD PRESSURE: 110 MMHG | WEIGHT: 135 LBS

## 2018-06-26 DIAGNOSIS — C67.9 MALIGNANT NEOPLASM OF URINARY BLADDER, UNSPECIFIED SITE (HCC): Primary | ICD-10-CM

## 2018-06-26 PROCEDURE — 99213 OFFICE O/P EST LOW 20 MIN: CPT | Performed by: PHYSICIAN ASSISTANT

## 2018-06-26 NOTE — PROGRESS NOTES
1  Malignant neoplasm of urinary bladder, unspecified site [facility])  US kidney and bladder    Basic metabolic panel    PSA, Total Screen         Assessment and plan:       1  bladder cancer s/p cystoprostatectomy and ileal conduit 2010 -managed by Dr Miladis Quintero  - we discussed that hi renal function remains stable at baseline is 0 9  We also discussed that his kidneys look excellent on ultrasound without any evidence of obstructive uropathy   -his previous CT was negative for any evidence of residual disease or new metastatic disease   - he follow up in 1 year with an ultrasound of the kidneys, BMP, PSA prior to visit    2  Enterocutaneous fistula  -continue management with wound care  Heather Abbott PA-C      Chief Complaint      Follow-up bladder cancer    History of Present Illness     James Ojeda is a 80 y o  male patient of Dr Miladis Quintero with a history of bladder cancer s/p cystoprostatectomy 2010 presenting for 1 year follow-up  Patient does have a known parastomal hernia  He had undergone a right hemicolectomy and parastomal hernia repair 7/2017 by Dr Wallis Close  Pregnant then did the emergency department due to brown date renewed from incision  CT performed was read as Anterior abdominal wall soft tissue likely related to the reported incisional drainage   This was not present on the previous study and is suspicious for acute inflammatory phlegmon   No discrete abscess is identified  He was admitted to the hospital and given IV antibiotics and ostomy appliance was placed over draining area  He had a recent CT of the abdomen and pelvis with contrast 04/23/2018  There is no evidence any malignant process  There was not decrease in size of soft tissue density of the abdominal wall which may represent an enterocutaneous fistula  Recent ultrasound of the kidney and bladder 06/22/2018 revealed normal kidneys without any masses nor hydronephrosis      Patient is present with his wife with relations today  Patient's wife states that she has followed up with Dr Eusebio Suazo and the recommendations for observation of his fistula at this time given high risk of surgical complications  Patient has been followed by wound management and has an ostomy over his ileoconduit as well as a separate ostomy over his fistula  Most recent PSA is <0 01 (6/7/18)  Laboratory     Lab Results   Component Value Date    CREATININE 0 91 04/23/2018       Lab Results   Component Value Date    PSA <0 1 05/24/2017    PSA <0 1 05/11/2016    PSA <0 1 05/08/2015     Review of Systems     Review of Systems   Unable to perform ROS: Dementia       Allergies     No Known Allergies    Physical Exam     Physical Exam   Constitutional: He appears well-developed and well-nourished  No distress  HENT:   Head: Normocephalic and atraumatic  Eyes: Conjunctivae are normal    Neck: Normal range of motion  No tracheal deviation present  Pulmonary/Chest: Effort normal    Abdominal:   Ileal conduit stoma pink, patent, draining clear yellow urine without sediment  Fistula draining fecal content into ostomy  No erythema, fluctuance, or evidence of infection   Musculoskeletal: Normal range of motion  He exhibits no edema or deformity  Neurological: He is alert  Pleasantly confused   Skin: Skin is warm and dry  No rash noted  He is not diaphoretic  No erythema  Psychiatric: He has a normal mood and affect           Vital Signs     Vitals:    06/26/18 1451   BP: 110/62   Pulse: 66   Weight: 61 2 kg (135 lb)   Height: 5' 7" (1 702 m)         Current Medications       Current Outpatient Prescriptions:     acetaminophen (TYLENOL) 325 mg tablet, Take 650 mg by mouth every 6 (six) hours as needed for mild pain, Disp: , Rfl:     ALPRAZolam (XANAX) 0 25 mg tablet, Take 0 25 mg by mouth 2 (two) times a day as needed for anxiety, Disp: , Rfl:     cholecalciferol (VITAMIN D3) 1,000 units tablet, Take 1,000 Units by mouth daily, Disp: , Rfl:   cyanocobalamin (VITAMIN B-12) 1,000 mcg tablet, Take by mouth daily, Disp: , Rfl:     ferrous sulfate 325 (65 Fe) mg tablet, Take 325 mg by mouth daily with breakfast, Disp: , Rfl:     levothyroxine 25 mcg tablet, Take 25 mcg by mouth daily, Disp: , Rfl:     Multiple Vitamin (MULTIVITAMIN) tablet, Take 1 tablet by mouth daily, Disp: , Rfl:       Active Problems     Patient Active Problem List   Diagnosis    Hypertension    Cancer ([facility] )    Bladder cancer ([facility] )    Hyperlipidemia    Vascular dementia    SVT (supraventricular tachycardia) (HCC)    Colonic mass    Abdominal abscess    UTI (urinary tract infection)    Hypothyroidism    [facility]    Leukocytosis    Hypokalemia    Cellulitis       Past Medical History     Past Medical History:   Diagnosis Date    Bladder cancer ([facility] )     Bursitis     shoulders and back    Cancer [facility]     bladder, prostate    Colon [facility] ([facility]     Coronary artery disease     Hard of hearing     Hyperlipidemia     Hypertension     Hypothyroidism 1/10/2018    Memory loss     Myocardial infarction ([facility] ) 1987    Prostate CA ([place] [address] )     UTI (urinary tract infection) 1/10/2018    Vascular dementia          Surgical History     Past Surgical History:   Procedure Laterality Date    CATARACT EXTRACTION      COLONOSCOPY      COLOSTOMY      CYSTECTOMY W/ URETEROILEAL CONDUIT      [place] LAP,SURG,COLECTOMY,W/REMVL TERM ILEUM Right 7/21/2017    Procedure: LAPAROSCOPIC ASSISTED COLON RESECITON, RIGHT COLON RESECTION, REPAIR INCISIONAL HERNIA;  Surgeon: Shazia Núñez MD;  [facility];  Service: General    ME REPAIR INCISIONAL HERNIA,REDUCIBLE N/A 7/21/2017    Procedure: OPEN REPAIR PARA-STOMAL HERNIA;  Surgeon: Shazia Núñez MD;  [facility];  Service: General         Family History     History reviewed  No pertinent family history        Social History     Social History       Radiology

## 2018-06-28 NOTE — PROGRESS NOTES
Hematology/Oncology Outpatient Follow- up Note  Gallo Read 80 y o  male MRN: @ Encounter: [id number]        Date:  6/28/2018        Assessment / Plan:    1  History of bladder cancer (2010), AJCC stage III,, and adenocarcinoma of the prostate, he is status post radical cystoprostatectomy with ileal conduit urinary diversion, appendectomy and a bilateral pelvic lymph node dissection, with no evidence of disease  His PSA level is <0 1  He has no evidence of recurrent bladder or prostate carcinoma  He is doing well and continues to follow up with Dr Chalo Enamorado on an annual basis  The patient is at [facility]  We have decided that he only needs follow up if he has problems in the future  There is no need to scan him being 8 years out from his bladder carcinoma  2  Vascular dementia: stable  3  Stage IIA, pT3pN0 adenocarcinoma of the colon: no intervention from medical oncology        Subjective:   CC: follow up regarding history of prostate and bladder cancer      HPI:    Mr SHANTHI GUAMAN is a 80 year old white male whose history dates back to 6/2009 at which time he developed urinary frequency  In August 2009, he developed mild hematuria  By March 2010 and his frequency was so significant that he was urinating every 10 minutes  He did go to his primary care practitioner, Dr Javi Gomez, who ordered tests, which revealed a bladder tumor  He was referred to Dr Leo Morris, a urologist, who admitted him to [address], did a partial removal of that tumor 3/27/10  He was then discharged and 2 weeks later he was brought to the OR by Dyana Enamorado and Kellen Evans  It was discovered that he had a large left inguinal hernia and that repair was done at the same time of his cystectomy  That pathology report did reveal prostate cancer as well  He was referred here for treatment options  Interval History:    Overall the patient has a decent energy level with an ECOG performance status of 2   He has a good appetite and his weight has been stable  He does drink 2 Boosts daily  He does have a fistula since his colon surgery, which has not improved  He denies fevers, chills, CP, SOB, N/V, diarrhea, all other ROS are unremarkable  PFSH was reviewed  Cancer Staging: limited stage bladder cancer    Previous Hematologic/ Oncologic History:    Radical cystoprostatectomy, with ileal conduit urinary diversion, appendectomy, bilateral pelvic lymph node dissection 4/19/10    Current Hematologic/ Oncologic Treatment:    observation          Test Results:    Imaging: Us Kidney And Bladder    Result Date: 6/25/2018  Narrative: RENAL ULTRASOUND INDICATION:   C67 9: Malignant neoplasm of bladder, unspecified  COMPARISON: Renal ultrasound 5/26/2017  TECHNIQUE:   Ultrasound of the retroperitoneum was performed with a curvilinear transducer utilizing volumetric sweeps and still imaging techniques  FINDINGS: KIDNEYS: Symmetric and normal size  Right kidney:  10 7 x 4 3 cm  Left kidney:  10 1 x 4 5 cm  Right kidney Normal echogenicity and contour  No suspicious masses detected  1 0 x 0 8 x 1 1 cm midpole simple cyst is not significantly changed  No hydronephrosis  No shadowing calculi  No perinephric fluid collections  Left kidney Normal echogenicity and contour  Scarring within the lower pole is noted  No suspicious masses detected  1 0 x 0 8 x 1 0 cm exophytic simple cyst arising from the lower pole is not significantly changed  No hydronephrosis  No shadowing calculi  No perinephric fluid collections  URETERS: Nonvisualized  BLADDER: The bladder is surgically absent  Impression: 1  Simple renal cysts  No suspicious mass identified  No hydronephrosis  2   Surgically absent bladder   Workstation performed: [id number]       Labs:   Lab Results   Component Value Date    WBC 5 05 04/23/2018    HGB 12 2 04/23/2018    HCT 38 4 04/23/2018    MCV 96 04/23/2018     (L) 04/23/2018     Lab Results   Component Value Date     04/23/2018 K 4 1 04/23/2018     04/23/2018    CO2 28 04/23/2018    ANIONGAP 9 04/23/2018    BUN 12 04/23/2018    CREATININE 0 91 04/23/2018    GLUCOSE 137 04/23/2018    GLUF 88 07/06/2017    CALCIUM 9 0 04/23/2018    AST 12 04/23/2018    ALT 18 04/23/2018    ALKPHOS 55 04/23/2018    PROT 7 2 04/23/2018    BILITOT 0 60 04/23/2018    EGFR 77 04/23/2018         No results found for: SPEP, UPEP    Lab Results   Component Value Date    PSA <0 1 05/24/2017    PSA <0 1 05/11/2016    PSA <0 1 05/08/2015       Lab Results   Component Value Date    CEA 1 8 07/06/2017       No results found for:     No results found for: AFP    No results found for: IRON, TIBC, FERRITIN    No results found for: [id number]      ROS: Review of Systems   Constitutional: Negative  HENT: Negative  Eyes: Negative  Respiratory: Negative  Cardiovascular: Negative  Gastrointestinal: Negative  Endocrine: Negative  Genitourinary: Negative  Musculoskeletal: Negative  Skin: Negative  Allergic/Immunologic: Negative  Neurological: Negative  Hematological: Negative  Psychiatric/Behavioral: Negative  Current Medications: Reviewed  Allergies: Reviewed  PMH/FH/SH:  Reviewed      Physical Exam:    There is no height or weight on file to calculate BSA  Wt Readings from Last 3 Encounters:   06/26/18 61 2 kg (135 lb)   04/23/18 63 4 kg (139 lb 12 4 oz)   03/21/18 63 6 kg (140 lb 3 4 oz)        Temp Readings from Last 3 Encounters:   04/23/18 98 8 °F (37 1 °C) (Oral)   03/23/18 97 5 °F (36 4 °C) (Oral)   01/13/18 97 6 °F (36 4 °C) (Axillary)        BP Readings from Last 3 Encounters:   06/26/18 110/62   04/23/18 114/64   03/23/18 120/71         Pulse Readings from Last 3 Encounters:   06/26/18 66   04/23/18 83   03/23/18 76     @LASTSAO2(3)@      Physical Exam   Constitutional: He is oriented to person, place, and time  He appears well-developed and well-nourished  HENT:   Head: Normocephalic and atraumatic  Mouth/Throat: Oropharynx is clear and moist    Eyes: Conjunctivae and EOM are normal  Pupils are equal, round, and reactive to light  Neck: Normal range of motion  Neck supple  No thyromegaly present  Cardiovascular: Normal rate, regular rhythm and normal heart sounds  Pulmonary/Chest: Effort normal and breath sounds normal    Abdominal: Soft  Bowel sounds are normal  He exhibits no distension and no mass  There is no tenderness  colostomy   Musculoskeletal: Normal range of motion  He exhibits no edema  Lymphadenopathy:     He has no cervical adenopathy  Neurological: He is alert and oriented to person, place, and time  He has normal reflexes  Skin: Skin is warm and dry  No erythema  Psychiatric: He has a normal mood and affect  Vitals reviewed  Goals and Barriers:  Current Goal: Curative intent  Barriers: None  Patient's Capacity to Self Care:  Patient fully able to self care      Code Status: [unfilled]  Advance Directive and Living Will: Yes    Power of :

## 2018-07-02 ENCOUNTER — OFFICE VISIT (OUTPATIENT)
Dept: HEMATOLOGY ONCOLOGY | Facility: CLINIC | Age: 83
End: 2018-07-02
Payer: MEDICARE

## 2018-07-02 VITALS
SYSTOLIC BLOOD PRESSURE: 114 MMHG | RESPIRATION RATE: 16 BRPM | HEIGHT: 67 IN | OXYGEN SATURATION: 98 % | WEIGHT: 132 LBS | HEART RATE: 48 BPM | DIASTOLIC BLOOD PRESSURE: 68 MMHG | BODY MASS INDEX: 20.72 KG/M2 | TEMPERATURE: 97 F

## 2018-07-02 DIAGNOSIS — C67.9 MALIGNANT NEOPLASM OF URINARY BLADDER, UNSPECIFIED SITE (HCC): Primary | ICD-10-CM

## 2018-07-02 PROCEDURE — 99214 OFFICE O/P EST MOD 30 MIN: CPT | Performed by: SPECIALIST

## 2018-07-02 NOTE — LETTER
July 2, 2018     [place]  [address] [facility]    Patient: Angelo Monroy   YOB: 1934   Date of Visit: 7/2/2018       Dear Dr Mansoor Menon: Thank you for referring Juana Riley to me for evaluation  Below are my notes for this consultation  If you have questions, please do not hesitate to call me  I look forward to following your patient along with you  Sincerely,        Chauncey Garcia MD        CC: MD Chauncey Jones MD  6/28/2018  3:55 PM  Sign at close encounter  Hematology/Oncology Outpatient Follow- up Note  Frieda Bullock 80 y o  male MRN: @ Encounter: [id number]        Date:  6/28/2018        Assessment / Plan:    1  History of bladder cancer (2010), AJCC stage III,, and adenocarcinoma of the prostate, he is status post radical cystoprostatectomy with ileal conduit urinary diversion, appendectomy and a bilateral pelvic lymph node dissection, with no evidence of disease  His PSA level is <0 1  He has no evidence of recurrent bladder or prostate carcinoma  He is doing well and continues to follow up with Dr Michael Zhang on an annual basis  He will follow up with us in one year with a repeat CBC, CMP, LDH, and PSA level  Subjective:   CC: follow up regarding history of prostate and bladder cancer      HPI:    Mr Janelle Sterling is a 80 year old white male whose history dates back to 6/2009 at which time he developed urinary frequency  In August 2009, he developed mild hematuria  By March 2010 and his frequency was so significant that he was urinating every 10 minutes  He did go to his primary care practitioner, Dr Bridgette Laura, who ordered tests, which revealed a bladder tumor  He was referred to Dr MARQUEZ [facility], a urologist, who admitted him to [address], did a partial removal of that tumor 3/27/10  He was then discharged and 2 weeks later he was brought to the OR by Dyana Zhang and Tiera Reeves   It was discovered that he had a large left inguinal hernia and that repair was done at the same time of his cystectomy  That pathology report did reveal prostate cancer as well  He was referred here for treatment options  Interval History:          Cancer Staging: limited stage bladder cancer    Previous Hematologic/ Oncologic History:    Radical cystoprostatectomy, with ileal conduit urinary diversion, appendectomy, bilateral pelvic lymph node dissection 4/19/10    Current Hematologic/ Oncologic Treatment:    observation          Test Results:    Imaging: Us Kidney And Bladder    Result Date: 6/25/2018  Narrative: RENAL ULTRASOUND INDICATION:   C67 9: Malignant neoplasm of bladder, unspecified  COMPARISON: Renal ultrasound 5/26/2017  TECHNIQUE:   Ultrasound of the retroperitoneum was performed with a curvilinear transducer utilizing volumetric sweeps and still imaging techniques  FINDINGS: KIDNEYS: Symmetric and normal size  Right kidney:  10 7 x 4 3 cm  Left kidney:  10 1 x 4 5 cm  Right kidney Normal echogenicity and contour  No suspicious masses detected  1 0 x 0 8 x 1 1 cm midpole simple cyst is not significantly changed  No hydronephrosis  No shadowing calculi  No perinephric fluid collections  Left kidney Normal echogenicity and contour  Scarring within the lower pole is noted  No suspicious masses detected  1 0 x 0 8 x 1 0 cm exophytic simple cyst arising from the lower pole is not significantly changed  No hydronephrosis  No shadowing calculi  No perinephric fluid collections  URETERS: Nonvisualized  BLADDER: The bladder is surgically absent  Impression: 1  Simple renal cysts  No suspicious mass identified  No hydronephrosis  2   Surgically absent bladder   Workstation performed: [id number]       Labs:   Lab Results   Component Value Date    WBC 5 05 04/23/2018    HGB 12 2 04/23/2018    HCT 38 4 04/23/2018    MCV 96 04/23/2018     (L) 04/23/2018     Lab Results   Component Value Date     04/23/2018    K 4 1 04/23/2018     04/23/2018    CO2 28 04/23/2018    ANIONGAP 9 04/23/2018    BUN 12 04/23/2018    CREATININE 0 91 04/23/2018    GLUCOSE 137 04/23/2018    GLUF 88 07/06/2017    CALCIUM 9 0 04/23/2018    AST 12 04/23/2018    ALT 18 04/23/2018    ALKPHOS 55 04/23/2018    PROT 7 2 04/23/2018    BILITOT 0 60 04/23/2018    EGFR 77 04/23/2018         No results found for: SPEP, UPEP    Lab Results   Component Value Date    PSA <0 1 05/24/2017    PSA <0 1 05/11/2016    PSA <0 1 05/08/2015       Lab Results   Component Value Date    CEA 1 8 07/06/2017       No results found for:     No results found for: AFP    No results found for: IRON, TIBC, FERRITIN    No results found for: [id number]      ROS: Review of Systems      Current Medications: Reviewed  Allergies: Reviewed  PMH/FH/SH:  Reviewed      Physical Exam:    There is no height or weight on file to calculate BSA  Wt Readings from Last 3 Encounters:   06/26/18 61 2 kg (135 lb)   04/23/18 63 4 kg (139 lb 12 4 oz)   03/21/18 63 6 kg (140 lb 3 4 oz)        Temp Readings from Last 3 Encounters:   04/23/18 98 8 °F (37 1 °C) (Oral)   03/23/18 97 5 °F (36 4 °C) (Oral)   01/13/18 97 6 °F (36 4 °C) (Axillary)        BP Readings from Last 3 Encounters:   06/26/18 110/62   04/23/18 114/64   03/23/18 120/71         Pulse Readings from Last 3 Encounters:   06/26/18 66   04/23/18 83   03/23/18 76     @LASTSAO2(3)@      Physical Exam      Goals and Barriers:  Current Goal: Curative intent  Barriers: None  Patient's Capacity to Self Care:  Patient fully able to self care      Code Status: [unfilled]  Advance Directive and Living Will: Yes    Power of :

## 2018-07-23 ENCOUNTER — HOSPITAL ENCOUNTER (EMERGENCY)
Facility: HOSPITAL | Age: 83
Discharge: HOME | End: 2018-07-24
Attending: EMERGENCY MEDICINE | Admitting: EMERGENCY MEDICINE
Payer: MEDICARE

## 2018-07-23 DIAGNOSIS — R07.9 CHEST PAIN: Primary | ICD-10-CM

## 2018-07-23 LAB
ANION GAP SERPL CALCULATED.3IONS-SCNC: 3 MMOL/L (ref 4–13)
APTT PPP: 26 SECONDS (ref 24–36)
BASOPHILS # BLD AUTO: 0.03 THOUSANDS/ΜL (ref 0–0.1)
BASOPHILS NFR BLD AUTO: 0 % (ref 0–1)
BUN SERPL-MCNC: 17 MG/DL (ref 5–25)
CALCIUM SERPL-MCNC: 9.4 MG/DL (ref 8.3–10.1)
CHLORIDE SERPL-SCNC: 103 MMOL/L (ref 100–108)
CO2 SERPL-SCNC: 30 MMOL/L (ref 21–32)
CREAT SERPL-MCNC: 1.17 MG/DL (ref 0.6–1.3)
EOSINOPHIL # BLD AUTO: 0.11 THOUSAND/ΜL (ref 0–0.61)
EOSINOPHIL NFR BLD AUTO: 2 % (ref 0–6)
ERYTHROCYTE [DISTWIDTH] IN BLOOD BY AUTOMATED COUNT: 14 % (ref 11.6–15.1)
GFR SERPL CREATININE-BSD FRML MDRD: 57 ML/MIN/1.73SQ M
GLUCOSE SERPL-MCNC: 102 MG/DL (ref 65–140)
HCT VFR BLD AUTO: 39.3 % (ref 36.5–49.3)
HGB BLD-MCNC: 12.4 G/DL (ref 12–17)
INR PPP: 1.03 (ref 0.86–1.17)
LYMPHOCYTES # BLD AUTO: 0.58 THOUSANDS/ΜL (ref 0.6–4.47)
LYMPHOCYTES NFR BLD AUTO: 8 % (ref 14–44)
MCH RBC QN AUTO: 30.9 PG (ref 26.8–34.3)
MCHC RBC AUTO-ENTMCNC: 31.6 G/DL (ref 31.4–37.4)
MCV RBC AUTO: 98 FL (ref 82–98)
MONOCYTES # BLD AUTO: 0.43 THOUSAND/ΜL (ref 0.17–1.22)
MONOCYTES NFR BLD AUTO: 6 % (ref 4–12)
NEUTROPHILS # BLD AUTO: 6.04 THOUSANDS/ΜL (ref 1.85–7.62)
NEUTS SEG NFR BLD AUTO: 84 % (ref 43–75)
PLATELET # BLD AUTO: 140 THOUSANDS/UL (ref 149–390)
PMV BLD AUTO: 10 FL (ref 8.9–12.7)
POTASSIUM SERPL-SCNC: 4.4 MMOL/L (ref 3.5–5.3)
PROTHROMBIN TIME: 13.2 SECONDS (ref 11.8–14.2)
RBC # BLD AUTO: 4.01 MILLION/UL (ref 3.88–5.62)
SODIUM SERPL-SCNC: 136 MMOL/L (ref 136–145)
WBC # BLD AUTO: 7.19 THOUSAND/UL (ref 4.31–10.16)

## 2018-07-23 PROCEDURE — 85730 THROMBOPLASTIN TIME PARTIAL: CPT | Performed by: EMERGENCY MEDICINE

## 2018-07-23 PROCEDURE — 85025 COMPLETE CBC W/AUTO DIFF WBC: CPT | Performed by: EMERGENCY MEDICINE

## 2018-07-23 PROCEDURE — 84484 ASSAY OF TROPONIN QUANT: CPT | Performed by: EMERGENCY MEDICINE

## 2018-07-23 PROCEDURE — 36415 COLL VENOUS BLD VENIPUNCTURE: CPT | Performed by: EMERGENCY MEDICINE

## 2018-07-23 PROCEDURE — 80048 BASIC METABOLIC PNL TOTAL CA: CPT | Performed by: EMERGENCY MEDICINE

## 2018-07-23 PROCEDURE — 93005 ELECTROCARDIOGRAM TRACING: CPT

## 2018-07-23 PROCEDURE — 85610 PROTHROMBIN TIME: CPT | Performed by: EMERGENCY MEDICINE

## 2018-07-24 VITALS
HEART RATE: 78 BPM | BODY MASS INDEX: 21.51 KG/M2 | OXYGEN SATURATION: 95 % | WEIGHT: 137.35 LBS | TEMPERATURE: 98.4 F | RESPIRATION RATE: 18 BRPM | SYSTOLIC BLOOD PRESSURE: 98 MMHG | DIASTOLIC BLOOD PRESSURE: 59 MMHG

## 2018-07-24 LAB — TROPONIN I SERPL-MCNC: <0.02 NG/ML

## 2018-07-24 PROCEDURE — 99285 EMERGENCY DEPT VISIT HI MDM: CPT

## 2018-07-24 PROCEDURE — 96360 HYDRATION IV INFUSION INIT: CPT

## 2018-07-24 RX ADMIN — SODIUM CHLORIDE 500 ML: 0.9 INJECTION, SOLUTION INTRAVENOUS at 00:34

## 2018-07-24 NOTE — ED NOTES
[facility] called to set up transport for patient back to [facility] in [place]       Clarence Vargas RN  07/24/18 [id number]

## 2018-07-24 NOTE — ED NOTES
[facility] called to follow up with discharge [place]  Spoke with Rosa Carbajal and made aware that Rudy Olivas has had multiple 911 calls and were backed up  Rosa Carbajal says he will try to set up a different transport team at this time   Awaiting a call back     Peggy Doyle RN  07/24/18 9526

## 2018-07-24 NOTE — ED PROVIDER NOTES
History  Chief Complaint   Patient presents with    Chest Pain     patient was at [facility] and complaining of chest pain  he does have dementia  Patient brought to the emergency department from a local nursing home  He allegedly complained of chest pain the patient has no recollection of this and states that he has no current complaints or symptoms or pain  Patient does have a history of dementia this may explain his ability to remember this  He offers no other complaints at this time  There is no reported trauma fall or injury  No report of fever chills cough breathing difficulty  His mentation is baseline as per the medics as per the nursing home  Prior to Admission Medications   Prescriptions Last Dose Informant Patient Reported? Taking?    ALPRAZolam (XANAX) 0 25 mg tablet   Yes No   Sig: Take 0 25 mg by mouth 2 (two) times a day as needed for anxiety   Multiple Vitamin (MULTIVITAMIN) tablet   Yes No   Sig: Take 1 tablet by mouth daily   acetaminophen (TYLENOL) 325 mg tablet   Yes No   Sig: Take 650 mg by mouth every 6 (six) hours as needed for mild pain   cholecalciferol (VITAMIN D3) 1,000 units tablet   Yes No   Sig: Take 1,000 Units by mouth daily   cyanocobalamin (VITAMIN B-12) 1,000 mcg tablet   Yes No   Sig: Take by mouth daily   ferrous sulfate 325 (65 Fe) mg tablet   Yes No   Sig: Take 325 mg by mouth daily with breakfast   levothyroxine 25 mcg tablet   Yes No   Sig: Take 25 mcg by mouth daily      Facility-Administered Medications: None       Past Medical History:   Diagnosis Date    Bladder cancer ([facility] )     Bursitis     shoulders and back    Cancer ([facility] )     bladder, prostate    Colon cancer ([facility] )     Coronary artery disease     Hard of hearing     Hyperlipidemia     Hypertension     Hypothyroidism 1/10/2018    Memory loss     [facility]    [facility]     UTI (urinary tract infection) 1/10/2018    Vascular dementia        Past Surgical History:   Procedure Laterality Date    CATARACT EXTRACTION      COLONOSCOPY      COLOSTOMY      CYSTECTOMY W/ URETEROILEAL CONDUIT      AR LAP,SURG,COLECTOMY,W/REMVL TERM ILEUM Right 7/21/2017    Procedure: LAPAROSCOPIC ASSISTED COLON RESECITON, RIGHT COLON RESECTION, REPAIR INCISIONAL HERNIA;  Surgeon: Li Foster MD;  Location: [facility];  Service: General    [address] [place] [address] N/A 7/21/2017    Procedure: OPEN REPAIR PARA-STOMAL HERNIA;  Surgeon: Li Foster MD;  Location: [facility];  Service: General       History reviewed  No pertinent family history  I have reviewed and agree with the history as documented  Social History   Substance Use Topics    Smoking status: Former Smoker     Quit date: 1987    Smokeless tobacco: Former User    Alcohol use No        Review of Systems   Constitutional: Negative  Negative for diaphoresis, fatigue and fever  HENT: Negative  Eyes: Negative  Negative for photophobia and visual disturbance  Respiratory: Positive for cough  Negative for choking, chest tightness, shortness of breath and wheezing  Cardiovascular: Positive for chest pain  Gastrointestinal: Negative  Negative for abdominal pain, diarrhea, nausea and vomiting  Endocrine: Negative  Genitourinary: Negative  Musculoskeletal: Negative  Skin: Negative  Allergic/Immunologic: Negative  Neurological: Negative  Negative for dizziness, seizures, syncope, facial asymmetry, speech difficulty, weakness, light-headedness, numbness and headaches  Hematological: Negative  Does not bruise/bleed easily  Psychiatric/Behavioral: Negative  Negative for confusion  Physical Exam  Physical Exam   Constitutional: He is oriented to person, place, and time  He appears well-developed and well-nourished  Comfortable appearance  Nontoxic  No respiratory distress  Patient looks comfortable sitting upright in the stretcher   He follows simple commands and moves all extremities spontaneously and to command  HENT:   Head: Normocephalic and atraumatic  Right Ear: External ear normal    Left Ear: External ear normal    Mouth/Throat: Oropharynx is clear and moist    Eyes: Conjunctivae and EOM are normal  Pupils are equal, round, and reactive to light  Neck: Normal range of motion  Neck supple  Cardiovascular: Normal rate, regular rhythm, normal heart sounds and intact distal pulses  Pulmonary/Chest: Effort normal and breath sounds normal  No respiratory distress  He has no wheezes  He has no rales  He exhibits no tenderness  Abdominal: Soft  Bowel sounds are normal  He exhibits no distension and no mass  There is no tenderness  There is no rebound and no guarding  No hernia  No reproducible abdominal tenderness to palpation  No peritoneal signs  Musculoskeletal: Normal range of motion  He exhibits no edema, tenderness or deformity  Neurological: He is alert and oriented to person, place, and time  He has normal reflexes  He displays normal reflexes  No cranial nerve deficit or sensory deficit  He exhibits normal muscle tone  Coordination normal    Skin: Skin is warm and dry  No rash noted  Psychiatric: He has a normal mood and affect  His behavior is normal  Judgment and thought content normal    Nursing note and vitals reviewed        Vital Signs  ED Triage Vitals [07/23/18 2259]   Temperature Pulse Respirations Blood Pressure SpO2   98 4 °F (36 9 °C) 85 19 95/51 93 %      Temp Source Heart Rate Source Patient Position - Orthostatic VS BP Location FiO2 (%)   Oral Monitor Sitting Right arm --      Pain Score       --           Vitals:    07/23/18 2259 07/23/18 2300 07/23/18 2345   BP: 95/51 95/51 91/50   Pulse: 85 80 82   Patient Position - Orthostatic VS: Sitting Lying Lying       Visual Acuity      ED Medications  Medications   sodium chloride 0 9 % bolus 500 mL (500 mL Intravenous New Bag 7/24/18 0034)       Diagnostic Studies  Results Reviewed Procedure Component Value Units Date/Time    Troponin I [[id number]]  (Normal) Collected:  07/23/18 2338    Lab Status:  Final result Specimen:  Blood from Arm, Right Updated:  07/24/18 0004     Troponin I <0 02 ng/mL     Protime-INR [[id number]]  (Normal) Collected:  07/23/18 2338    Lab Status:  Final result Specimen:  Blood from Arm, Right Updated:  07/23/18 2357     Protime 13 2 seconds      INR 1 03    APTT [[id number]]  (Normal) Collected:  07/23/18 2338    Lab Status:  Final result Specimen:  Blood from Arm, Right Updated:  07/23/18 2357     PTT 26 seconds     Basic metabolic panel [[id number]]  (Abnormal) Collected:  07/23/18 2338    Lab Status:  Final result Specimen:  Blood from Arm, Right Updated:  07/23/18 2356     Sodium 136 mmol/L      Potassium 4 4 mmol/L      Chloride 103 mmol/L      CO2 30 mmol/L      Anion Gap 3 (L) mmol/L      BUN 17 mg/dL      Creatinine 1 17 mg/dL      Glucose 102 mg/dL      Calcium 9 4 mg/dL      eGFR 57 ml/min/1 73sq m     Narrative:         National Kidney Disease Education Program recommendations are as follows:  GFR calculation is accurate only with a steady state creatinine  Chronic Kidney disease less than 60 ml/min/1 73 sq  meters  Kidney failure less than 15 ml/min/1 73 sq  meters      CBC and differential [[id number]]  (Abnormal) Collected:  07/23/18 2338    Lab Status:  Final result Specimen:  Blood from Arm, Right Updated:  07/23/18 2347     WBC 7 19 Thousand/uL      RBC 4 01 Million/uL      Hemoglobin 12 4 g/dL      Hematocrit 39 3 %      MCV 98 fL      MCH 30 9 pg      MCHC 31 6 g/dL      RDW 14 0 %      MPV 10 0 fL      Platelets 501 (L) Thousands/uL      Neutrophils Relative 84 (H) %      Lymphocytes Relative 8 (L) %      Monocytes Relative 6 %      Eosinophils Relative 2 %      Basophils Relative 0 %      Neutrophils Absolute 6 04 Thousands/µL      Lymphocytes Absolute 0 58 (L) Thousands/µL      Monocytes Absolute 0 43 Thousand/µL      Eosinophils Absolute 0 11 Thousand/µL Basophils Absolute 0 03 Thousands/µL                  No orders to display              Procedures  ECG 12 Lead Documentation  Date/Time: 7/23/2018 11:18 PM  Performed by: Jens Kehr by: Kelly Fleming     ECG reviewed by me, the ED Provider: yes    Patient location:  ED  Previous ECG:     Previous ECG:  Compared to current    Similarity:  Changes noted  Interpretation:     Interpretation: abnormal    Rate:     ECG rate:  97    ECG rate assessment: normal    Rhythm:     Rhythm: sinus rhythm    Ectopy:     Ectopy: aberrant, PAC and PVCs    QRS:     QRS axis:  Normal    QRS intervals:  Normal  Conduction:     Conduction: normal    ST segments:     ST segments:  Non-specific  T waves:     T waves: non-specific             Phone Contacts  ED Phone Contact    ED Course  ED Course as of Jul 24 0054   Tue Jul 24, 2018   0021  Patient is stable for discharge  His blood work and EKG and troponin are unremarkable  Patient is asymptomatic at discharge  0024  Patient's blood pressure was noticed to be low prior discharge  He  presents with no evidence historically or by exam of infection or sepsis  Will be given a saline bolus to increased pressure prior to discharge  MDM  CritCare Time    Disposition  Final diagnoses:   Chest pain     Time reflects when diagnosis was documented in both MDM as applicable and the Disposition within this note     Time User Action Codes Description Comment    7/24/2018 12:23 AM Tahmina Pedraza Add [R07 9] Chest pain       ED Disposition     ED Disposition Condition Comment    Discharge  Pema Myles 42 discharge to home/self care      Condition at discharge: Stable        Follow-up Information     Follow up With Specialties Details Why Contact Info     private physician  Schedule an appointment as soon as possible for a visit            Patient's Medications   Discharge Prescriptions    No medications on file     No discharge procedures on file      ED Provider  Electronically Signed by           Stacey De Jesus MD  07/24/18 0023       Stacey De Jesus MD  07/24/18 6010

## 2018-07-24 NOTE — DISCHARGE INSTRUCTIONS
Chest Pain   WHAT YOU NEED TO KNOW:   What causes chest pain? Chest pain can be caused by a range of conditions, from not serious to life-threatening  Chest pain can be a symptom of a digestive problem, such as acid reflux or a stomach ulcer  An anxiety attack or a strong emotion, such as anger, can also cause chest pain  Infection, inflammation, or a fracture in the bones or cartilage in your chest can cause pain or discomfort  Sometimes chest pain or pressure is caused by poor blood flow to your heart (angina)  Chest pain may also be caused by life-threatening conditions such as a heart attack or blood clot in your lungs  What other symptoms might I have with chest pain? · A burning feeling behind your breastbone    · A racing or slow heartbeat     · Fever or sweating     · Nausea or vomiting     · Shortness of breath     · Discomfort or pressure that spreads from your chest to your back, jaw, or arm     · Feeling weak, tired, or faint  How is the cause of chest pain diagnosed? Your healthcare provider will examine you  Describe your chest pain in as much detail as possible  Tell him or her where your pain is and when it began  Tell the provider if you notice anything that makes the pain worse or better  Tell him or her if it is constant or comes and goes  Your healthcare provider will ask about any medicines you use and medical conditions you have  He or she will also examine you  You may also need any of the following tests:  · An EKG  is a test that records your heart's electrical activity  · Blood tests  check for heart damage and signs of a heart attack  · An echocardiogram  uses sound waves to see if blood is flowing normally through your heart  · An ultrasound, x-ray, CT, or MRI scan  may show the cause of your chest pain  You may be given contrast liquid to help your heart show up better in the pictures   Tell the healthcare provider if you have ever had an allergic reaction to contrast liquid  Do not enter the MRI room with anything metal  Metal can cause serious injury  Tell the healthcare provider if you have any metal in or on your body  · An endoscopy  may be done to check for ulcers or problem with your esophagus  How is chest pain treated? Medicines may be given to treat the cause of your chest pain  Examples include pain medicine, anxiety medicine, or medicines to increase blood flow to your heart  Do not  take certain medicines without asking your healthcare provider first  These include NSAIDs, herbal or vitamin supplements, or hormones (estrogen or progestin)  What are some healthy living tips? The following are general healthy guidelines  If your chest pain is caused by a heart problem, your healthcare provider will give you specific guidelines to follow  · Do not smoke  Nicotine and other chemicals in cigarettes and cigars can cause lung and heart damage  Ask your healthcare provider for information if you currently smoke and need help to quit  E-cigarettes or smokeless tobacco still contain nicotine  Talk to your healthcare provider before you use these products  · Eat a variety of healthy, low-fat foods  Healthy foods include fruits, vegetables, whole-grain breads, low-fat dairy products, beans, lean meats, and fish  Ask for more information about a heart healthy diet  · Ask about activity  Your healthcare provider will tell you which activities to limit or avoid  Ask when you can drive, return to work, and have sex  Ask about the best exercise plan for you  · Maintain a healthy weight  Ask your healthcare provider how much you should weigh  Ask him or her to help you create a weight loss plan if you are overweight    Call 911 if:   · You have any of the following signs of a heart attack:      ¨ Squeezing, pressure, or pain in your chest that lasts longer than 5 minutes or returns    ¨ Discomfort or pain in your back, neck, jaw, stomach, or arm     ¨ Trouble breathing    ¨ Nausea or vomiting    ¨ Lightheadedness or a sudden cold sweat, especially with chest pain or trouble breathing    When should I seek immediate care? · You have chest discomfort that gets worse, even with medicine  · You cough or vomit blood  · Your bowel movements are black or bloody  · You cannot stop vomiting, or it hurts to swallow  When should I contact my healthcare provider? · You have questions or concerns about your condition or care  CARE AGREEMENT:   You have the right to help plan your care  Learn about your health condition and how it may be treated  Discuss treatment options with your caregivers to decide what care you want to receive  You always have the right to refuse treatment  The above information is an  only  It is not intended as medical advice for individual conditions or treatments  Talk to your doctor, nurse or pharmacist before following any medical regimen to see if it is safe and effective for you  © 2017 [address] [employer] Information is for End User's use only and may not be sold, redistributed or otherwise used for commercial purposes  All illustrations and images included in CareNotes® are the copyrighted property of [employer]  or [employer]

## 2018-07-25 LAB
ATRIAL RATE: 101 BPM
P AXIS: 72 DEGREES
PR INTERVAL: 140 MS
QRS AXIS: -17 DEGREES
QRSD INTERVAL: 86 MS
QT INTERVAL: 348 MS
QTC INTERVAL: 442 MS
T WAVE AXIS: -60 DEGREES
VENTRICULAR RATE: 97 BPM

## 2018-07-25 PROCEDURE — 93010 ELECTROCARDIOGRAM REPORT: CPT | Performed by: INTERNAL MEDICINE

## 2018-12-25 ENCOUNTER — HOSPITAL ENCOUNTER (EMERGENCY)
Facility: HOSPITAL | Age: 83
Discharge: HOME | End: 2018-12-25
Attending: EMERGENCY MEDICINE | Admitting: EMERGENCY MEDICINE
Payer: MEDICARE

## 2018-12-25 ENCOUNTER — APPOINTMENT (EMERGENCY)
Dept: RADIOLOGY | Facility: HOSPITAL | Age: 83
End: 2018-12-25
Payer: MEDICARE

## 2018-12-25 VITALS
HEART RATE: 76 BPM | RESPIRATION RATE: 18 BRPM | DIASTOLIC BLOOD PRESSURE: 83 MMHG | WEIGHT: 130.73 LBS | TEMPERATURE: 98.3 F | OXYGEN SATURATION: 95 % | BODY MASS INDEX: 20.48 KG/M2 | SYSTOLIC BLOOD PRESSURE: 166 MMHG

## 2018-12-25 DIAGNOSIS — S72.001A CLOSED FRACTURE OF RIGHT HIP, INITIAL ENCOUNTER (HCC): ICD-10-CM

## 2018-12-25 DIAGNOSIS — S09.90XA INJURY OF HEAD, INITIAL ENCOUNTER: ICD-10-CM

## 2018-12-25 DIAGNOSIS — S01.01XA LACERATION OF SCALP, INITIAL ENCOUNTER: ICD-10-CM
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
## 2018-12-25 PROCEDURE — 96372 THER/PROPH/DIAG INJ SC/IM: CPT

## 2018-12-25 PROCEDURE — 99284 EMERGENCY DEPT VISIT MOD MDM: CPT

## 2018-12-25 PROCEDURE — 73502 X-RAY EXAM HIP UNI 2-3 VIEWS: CPT

## 2018-12-25 PROCEDURE — 90471 IMMUNIZATION ADMIN: CPT

## 2018-12-25 PROCEDURE — 90715 TDAP VACCINE 7 YRS/> IM: CPT | Performed by: EMERGENCY MEDICINE

## 2018-12-25 RX ORDER — LIDOCAINE HYDROCHLORIDE AND EPINEPHRINE 10; 10 MG/ML; UG/ML
20 INJECTION, SOLUTION INFILTRATION; PERINEURAL ONCE
Status: COMPLETED | OUTPATIENT
Start: 2018-12-25 | End: 2018-12-25

## 2018-12-25 RX ORDER — ONDANSETRON 4 MG/1
4 TABLET, ORALLY DISINTEGRATING ORAL ONCE
Status: DISCONTINUED | OUTPATIENT
Start: 2018-12-25 | End: 2018-12-25 | Stop reason: HOSPADM

## 2018-12-25 RX ORDER — GINSENG 100 MG
1 CAPSULE ORAL ONCE
Status: DISCONTINUED | OUTPATIENT
Start: 2018-12-25 | End: 2018-12-25 | Stop reason: HOSPADM

## 2018-12-25 RX ADMIN — MORPHINE SULFATE 2 MG: 2 INJECTION, SOLUTION INTRAMUSCULAR; INTRAVENOUS at 07:27

## 2018-12-25 RX ADMIN — TETANUS TOXOID, REDUCED DIPHTHERIA TOXOID AND ACELLULAR PERTUSSIS VACCINE, ADSORBED 0.5 ML: 5; 2.5; 8; 8; 2.5 SUSPENSION INTRAMUSCULAR at 07:30

## 2018-12-25 RX ADMIN — LIDOCAINE HYDROCHLORIDE AND EPINEPHRINE 20 ML: 10; 10 INJECTION, SOLUTION INFILTRATION; PERINEURAL at 07:30

## 2018-12-25 NOTE — DISCHARGE INSTRUCTIONS
Head Injury   WHAT YOU NEED TO KNOW:   A head injury is most often caused by a blow to the head  This may occur from a fall, bicycle injury, sports injury, being struck in the head, or a motor vehicle accident  DISCHARGE INSTRUCTIONS:   Call 911 or have someone else call for any of the following:   · You cannot be woken  · You have a seizure  · You stop responding to others or you faint  · You have blurry or double vision  · Your speech becomes slurred or confused  · You have arm or leg weakness, loss of feeling, or new problems with coordination  · Your pupils are larger than usual or one pupil is a different size than the other  · You have blood or clear fluid coming out of your ears or nose  Return to the emergency department if:   · You have repeated or forceful vomiting  · You feel confused  · Your headache gets worse or becomes severe  · You or someone caring for you notices that you are harder to wake than usual   Contact your healthcare provider if:   · Your symptoms last longer than 6 weeks after the injury  · You have questions or concerns about your condition or care  Medicines:   · Acetaminophen  decreases pain  Acetaminophen is available without a doctor's order  Ask how much to take and how often to take it  Follow directions  Acetaminophen can cause liver damage if not taken correctly  · Take your medicine as directed  Contact your healthcare provider if you think your medicine is not helping or if you have side effects  Tell him or her if you are allergic to any medicine  Keep a list of the medicines, vitamins, and herbs you take  Include the amounts, and when and why you take them  Bring the list or the pill bottles to follow-up visits  Carry your medicine list with you in case of an emergency  Self-care:   · Rest  or do quiet activities for 24 to 48 hours  Limit your time watching TV, using the computer, or doing tasks that require a lot of thinking  Slowly return to your normal activities as directed  Do not play sports or do activities that may cause you to get hit in the head  Ask your healthcare provider when you can return to sports  · Apply ice  on your head for 15 to 20 minutes every hour or as directed  Use an ice pack, or put crushed ice in a plastic bag  Cover it with a towel before you apply it to your skin  Ice helps prevent tissue damage and decreases swelling and pain  · Have someone stay with you for 24 hours  or as directed  This person can monitor you for complications and call [id number]  When you are awake the person should ask you a few questions to see if you are thinking clearly  An example would be to ask your name or your address  Prevent another head injury:   · Wear a helmet that fits properly  Do this when you play sports, or ride a bike, scooter, or skateboard  Helmets help decrease your risk of a serious head injury  Talk to your healthcare provider about other ways you can protect yourself if you play sports  · Wear your seat belt every time you are in a car  This helps to decrease your risk for a head injury if you are in a car accident  Follow up with your healthcare provider as directed:  Write down your questions so you remember to ask them during your visits  © 2017 [address] [employer] Information is for End User's use only and may not be sold, redistributed or otherwise used for commercial purposes  All illustrations and images included in CareNotes® are the copyrighted property of [employer]  or [employer]  The above information is an  only  It is not intended as medical advice for individual conditions or treatments  Talk to your doctor, nurse or pharmacist before following any medical regimen to see if it is safe and effective for you  Laceration   WHAT YOU NEED TO KNOW:   A laceration is an injury to the skin and the soft tissue underneath it   Lacerations happen when you are cut or hit by something  They can happen anywhere on the body  DISCHARGE INSTRUCTIONS:   Return to the emergency department if:   · You have heavy bleeding or bleeding that does not stop after 10 minutes of holding firm, direct pressure over the wound  · Your wound opens up  Contact your healthcare provider if:   · You have a fever or chills  · Your laceration is red, warm, or swollen  · You have red streaks on your skin coming from your wound  · You have white or yellow drainage from the wound that smells bad  · You have pain that gets worse, even after treatment  · You have questions or concerns about your condition or care  Medicines:   · Prescription pain medicine  may be given  Ask how to take this medicine safely  · Antibiotics  help treat or prevent a bacterial infection  · Take your medicine as directed  Contact your healthcare provider if you think your medicine is not helping or if you have side effects  Tell him or her if you are allergic to any medicine  Keep a list of the medicines, vitamins, and herbs you take  Include the amounts, and when and why you take them  Bring the list or the pill bottles to follow-up visits  Carry your medicine list with you in case of an emergency  Care for your wound as directed:   · Do not get your wound wet  until your healthcare provider says it is okay  Do not soak your wound in water  Do not go swimming until your healthcare provider says it is okay  Carefully wash the wound with soap and water  Gently pat the area dry or allow it to air dry  · Change your bandages  when they get wet, dirty, or after washing  Apply new, clean bandages as directed  Do not apply elastic bandages or tape too tight  Do not put powders or lotions over your incision  · Apply antibiotic ointment as directed  Your healthcare provider may give you antibiotic ointment to put over your wound if you have stitches   If you have strips of tape over your incision, let them dry up and fall off on their own  If they do not fall off within 14 days, gently remove them  If you have glue over your wound, do not remove or pick at it  If your glue comes off, do not replace it with glue that you have at home  · Check your wound every day for signs of infection such as swelling, redness, or pus  Self-care:   · Apply ice  on your wound for 15 to 20 minutes every hour or as directed  Use an ice pack, or put crushed ice in a plastic bag  Cover it with a towel  Ice helps prevent tissue damage and decreases swelling and pain  · Use a splint as directed  A splint will decrease movement and stress on your wound  It may help it heal faster  A splint may be used for lacerations over joints or areas of your body that bend  Ask your healthcare provider how to apply and remove a splint  · Decrease scarring of your wound  by applying ointments as directed  Do not apply ointments until your healthcare provider says it is okay  You may need to wait until your wound is healed  Ask which ointment to buy and how often to use it  After your wound is healed, use sunscreen over the area when you are out in the sun  You should do this for at least 6 months to 1 year after your injury  Follow up with your healthcare provider as directed: You may need to follow up in 24 to 48 hours to have your wound checked for infection  You will need to return in 3 to 14 days if you have stitches or staples so they can be removed  Care for your wound as directed to prevent infection and help it heal  Write down your questions so you remember to ask them during your visits  © 2017 [address] [employer] Information is for End User's use only and may not be sold, redistributed or otherwise used for commercial purposes  All illustrations and images included in CareNotes® are the copyrighted property of [employer]  or [address]  The above information is an  only  It is not intended as medical advice for individual conditions or treatments  Talk to your doctor, nurse or pharmacist before following any medical regimen to see if it is safe and effective for you  Hip Fracture   WHAT YOU NEED TO KNOW:   A hip fracture is a break in the upper part of your femur (thigh bone)  The upper part of your femur includes the femoral head and the femoral neck  A hip fracture is often caused by a fall or injury on the side of your hip  DISCHARGE INSTRUCTIONS:   Medicines: You may  need any of the following:  · Acetaminophen  decreases pain and fever  It is available without a doctor's order  Ask how much to take and how often to take it  Follow directions  Acetaminophen can cause liver damage if not taken correctly  · Prescription pain medicine  may be given  Ask how to take this medicine safely  · Blood thinners    help prevent blood clots  Examples of blood thinners include heparin and warfarin  Clots can cause strokes, heart attacks, and death  The following are general safety guidelines to follow while you are taking a blood thinner:    ¨ Watch for bleeding and bruising while you take blood thinners  Watch for bleeding from your gums or nose  Watch for blood in your urine and bowel movements  Use a soft washcloth on your skin, and a soft toothbrush to brush your teeth  This can keep your skin and gums from bleeding  If you shave, use an electric shaver  Do not play contact sports  ¨ Tell your dentist and other healthcare providers that you take anticoagulants  Wear a bracelet or necklace that says you take this medicine  ¨ Do not start or stop any medicines unless your healthcare provider tells you to  Many medicines cannot be used with blood thinners  ¨ Tell your healthcare provider right away if you forget to take the medicine, or if you take too much  ¨ Warfarin  is a blood thinner that you may need to take   The following are things you should be aware of if you take warfarin  § Foods and medicines can affect the amount of warfarin in your blood  Do not make major changes to your diet while you take warfarin  Warfarin works best when you eat about the same amount of vitamin K every day  Vitamin K is found in green leafy vegetables and certain other foods  Ask for more information about what to eat when you are taking warfarin  § You will need to see your healthcare provider for follow-up visits when you are on warfarin  You will need regular blood tests  These tests are used to decide how much medicine you need  · Take your medicine as directed  Contact your healthcare provider if you think your medicine is not helping or if you have side effects  Tell him or her if you are allergic to any medicine  Keep a list of the medicines, vitamins, and herbs you take  Include the amounts, and when and why you take them  Bring the list or the pill bottles to follow-up visits  Carry your medicine list with you in case of an emergency  Call 911 for any of the following:   · Your leg feels warm, tender, and painful  It may look swollen and red  · You feel lightheaded, short of breath, and have chest pain  · You cough up blood  Return to the emergency department if:   · You have severe pain, even after you take pain medicine  · Your legs are numb  · You cannot move your leg or foot  Contact your healthcare provider if:   · You have a fever  · You have a blister or open sore  · You have a sore that is red, swollen, or draining pus  · You have increased pain, numbness, tingling, or leg swelling  · You have worsening function or deformity  · You have questions or concerns about your condition or care  Follow up with your healthcare provider as directed: You will need to return for more x-rays  Your healthcare provider may also want to check you for osteoporosis   Osteoporosis is a condition that causes bones to become brittle and break easily after a fall  You will need treatment if you develop osteoporosis  Write down your questions so you remember to ask them during your visits  Prevent falls:   · Talk to your healthcare provider about all of the medicines you take  Some medicines can cause dizziness or drowsiness and increase your risk for falls  · Have your vision checked regularly  Your vision may worsen over time and increase your risk for falls  · Use walking devices , such as canes or walkers, if you have trouble keeping your balance  · Make your home safe:      ¨ Improve the lighting in your home so that you can see where you are walking better  ¨ Add grab bars to the inside and outside of your tub or shower and next to the toilet  ¨ Add railings to both sides of your stairways  ¨ Remove throw rugs and other objects that can cause you to trip and fall  Manage your hip fracture:   · Eat foods that are high in calcium and vitamin D  Your healthcare provider may tell you to eat more dairy products, such as milk and cheese, for calcium  Spinach, salmon, and dried beans are also good sources of calcium  Cereal, bread, and orange juice may be fortified with vitamin D  You also get vitamin D from exposure to sunlight  Your healthcare provider may also suggest a calcium or vitamin D supplement  Do not take supplements unless directed  · Rest as directed  You may need a brace or pillow between your legs while your fracture heals  · Go to physical therapy as directed  A physical therapist will teach you exercises to help improve movement and strength, and to decrease pain during recovery  © 2017 [facility] Information is for End User's use only and may not be sold, redistributed or otherwise used for commercial purposes  All illustrations and images included in CareNotes® are the copyrighted property of [employer]  or [employer]  The above information is an  only  It is not intended as medical advice for individual conditions or treatments  Talk to your doctor, nurse or pharmacist before following any medical regimen to see if it is safe and effective for you  Staple Care   WHAT YOU NEED TO KNOW:   Staples are often used to close a wound  Your staples may be placed for 3 to 14 days, depending on the location of your wound  DISCHARGE INSTRUCTIONS:   Care for your wound:   · Clean:      ¨ You may be able to shower in 24 hours  Do not soak your wound under water  ¨ Gently wash your wound with soap and warm water daily  Lightly pat it dry  Do not cover your wound unless your healthcare provider tells you to  ¨ You may also need to clean your wound with a mixture of hydrogen peroxide and water  Ask how to do this  ¨ Do not apply ointment or cream to the wound unless your healthcare provider tells you to  · Elevate:      ¨ Rest any arm or leg that has a wound on pillows above the level of your heart  Do this as often as possible for 2 days  This will help decrease swelling and pain, and help you heal faster  · Minimize scarring:      ¨ Avoid sunshine on your wound to reduce scarring  Follow up with your healthcare provider as directed: You may need to return for a wound checkup 3 days after your staples are placed  Ask when you should return to get your staples removed  Staple removal:   · A medical staple remover  will be used to take out your staples  Your healthcare provider will slide the tool under each staple, squeeze the handle, and gently pull the staple out  · Medical tape  will be placed on your wound once your staples are removed  This will help keep your wound closed  The medical tape will fall off on its own after several days  Contact your healthcare provider if:   · You have redness, pain, swelling, and pus draining from your wound  · Your pain medicine does not relieve your pain      · You have a fever of 101°F (38 5°C) or higher  · You have an odor coming from your wound  · You have questions or concerns about your condition or care  Return to the emergency department if:   · Your wound reopens  · You have red streaks in your skin that spread out from your wound  · You have severe pain or vomiting  © 2017 [address] [employer] Information is for End User's use only and may not be sold, redistributed or otherwise used for commercial purposes  All illustrations and images included in CareNotes® are the copyrighted property of [employer]  or [employer]  The above information is an  only  It is not intended as medical advice for individual conditions or treatments  Talk to your doctor, nurse or pharmacist before following any medical regimen to see if it is safe and effective for you

## 2018-12-25 NOTE — ED PROVIDER NOTES
History  Chief Complaint   Patient presents with   Wilver Daubs Fall     pt resides at [address] [facility] [address] comes in for unwitnessed fall  was found laying next to bed in room  pt has lac above R eye  No thinners on board  pt hx dementia     Patient is a 80year old male with dementia who was found on the floor at the nursing facility this AM and apparently fell  Unknown LOC  Patient has dementia and cannot provide hx  Patient is on hospice  ? last Td  Was last seen in this ED on 7/23/18 for chest pain  [facility] website checked on this patient and last Rx filled was on 12/22/18 for ativan for 13 day supply  (+) headache in region of R frontal scalp laceration  History provided by:  EMS personnel, nursing home and patient (Dr Fritz Lopez)  History limited by:  Dementia   used: No        Prior to Admission Medications   Prescriptions Last Dose Informant Patient Reported? Taking?    ALPRAZolam (XANAX) 0 25 mg tablet   Yes No   Sig: Take 0 25 mg by mouth 2 (two) times a day as needed for anxiety   Multiple Vitamin (MULTIVITAMIN) tablet   Yes No   Sig: Take 1 tablet by mouth daily   acetaminophen (TYLENOL) 325 mg tablet   Yes No   Sig: Take 650 mg by mouth every 6 (six) hours as needed for mild pain   cholecalciferol (VITAMIN D3) 1,000 units tablet   Yes No   Sig: Take 1,000 Units by mouth daily   cyanocobalamin (VITAMIN B-12) 1,000 mcg tablet   Yes No   Sig: Take by mouth daily   ferrous sulfate 325 (65 Fe) mg tablet   Yes No   Sig: Take 325 mg by mouth daily with breakfast   levothyroxine 25 mcg tablet   Yes No   Sig: Take 25 mcg by mouth daily      Facility-Administered Medications: None       Past Medical History:   Diagnosis Date    Bladder cancer ([address] )     Bursitis     shoulders and back    Cancer ([facility] )     bladder, prostate    Colon cancer ([facility] )     Coronary artery disease     Hard of hearing     Hyperlipidemia     Hypertension     Hypothyroidism 1/10/2018    Memory loss     Myocardial infarction ([address] ) 26    Prostate CA ([facility]     UTI (urinary tract infection) 1/10/2018    Vascular dementia        Past Surgical History:   Procedure Laterality Date    CATARACT EXTRACTION      COLONOSCOPY      COLOSTOMY      CYSTECTOMY W/ URETEROILEAL CONDUIT      AK LAP,SURG,COLECTOMY,W/REMVL TERM ILEUM Right 7/21/2017    Procedure: LAPAROSCOPIC ASSISTED COLON RESECITON, RIGHT COLON RESECTION, REPAIR INCISIONAL HERNIA;  Surgeon: Addie Gates MD;  Location: [facility];  Service: General    [address] [place] [address] N/A 7/21/2017    Procedure: OPEN REPAIR PARA-STOMAL HERNIA;  Surgeon: Addie Gates MD;  Location: [facility];  Service: General       History reviewed  No pertinent family history  I have reviewed and agree with the history as documented  Social History   Substance Use Topics    Smoking status: Former Smoker     Quit date: 1987    Smokeless tobacco: Former User    Alcohol use No        Review of Systems   Unable to perform ROS: Dementia   Musculoskeletal: Positive for arthralgias  Skin: Positive for wound (R frontal scalp laceration)  Neurological: Positive for headaches  Physical Exam  Physical Exam   Constitutional: He appears distressed (moderate)  HENT:   Head: Normocephalic  Mouth/Throat: Oropharynx is clear and moist    Eyes: Pupils are equal, round, and reactive to light  EOM are normal  No scleral icterus  Neck: Normal range of motion  Neck supple  No JVD present  No tracheal deviation present  Cardiovascular: Normal rate, regular rhythm and normal heart sounds  No murmur heard  Pulmonary/Chest: Effort normal and breath sounds normal  No stridor  No respiratory distress  Abdominal: Soft  Bowel sounds are normal  There is no tenderness    (+) ostomy site viable  Musculoskeletal: He exhibits tenderness (R hip tenderness with external rotation  )  He exhibits no edema or deformity  Neurological:   Awake, confused  No gross focal deficits      Skin: Skin is warm and dry  No rash noted  No erythema  2 cm linear vertical R frontal scalp laceration  No active bleeding  No FB noted  No deep structure involvement  Mild tenderness  Nursing note and vitals reviewed  Vital Signs  ED Triage Vitals [12/25/18 0636]   Temperature Pulse Respirations Blood Pressure SpO2   98 3 °F (36 8 °C) 76 18 166/83 95 %      Temp Source Heart Rate Source Patient Position - Orthostatic VS BP Location FiO2 (%)   Oral Monitor Sitting Right arm --      Pain Score       --           Vitals:    12/25/18 0636   BP: 166/83   Pulse: 76   Patient Position - Orthostatic VS: Sitting       Visual Acuity  Visual Acuity      Most Recent Value   L Pupil Size (mm)  3   R Pupil Size (mm)  3          ED Medications  Medications   ondansetron (ZOFRAN-ODT) dispersible tablet 4 mg (4 mg Oral Not Given 12/25/18 0728)   bacitracin topical ointment 1 small application (not administered)   morphine injection 2 mg (2 mg Subcutaneous Given 12/25/18 0727)   tetanus-diphtheria-acellular pertussis (BOOSTRIX) IM injection 0 5 mL (0 5 mL Intramuscular Given 12/25/18 0730)   lidocaine-epinephrine (XYLOCAINE/EPINEPHRINE) 1 %-1:100,000 injection 20 mL (20 mL Infiltration Given by Other 12/25/18 0730)       Diagnostic Studies  Results Reviewed     None                 XR hip/pelv 2-3 vws right   ED Interpretation by Brittney Sevilla MD (12/25 2777)   FINDINGS:      There is a nondisplaced transcervical right hip fracture         No significant hip degenerative changes  No lytic or blastic osseous lesions  Soft tissues are unremarkable   Extensive postoperative changes in the abdomen and pelvis  The visualized lumbar spine is unremarkable  Impression:        Nondisplaced right hip fracture  Workstation performed: [id number]         Final Result by Sharda Reynolds MD (12/25 3057)      Nondisplaced right hip fracture              Workstation performed: [id number] Procedures  Lac Repair  Date/Time: 12/25/2018 7:25 AM  Performed by: Ona Sandhoff  Authorized by: Ona Sandhoff   Consent: Verbal consent obtained  Written consent not obtained  Consent given by: Dr Jalen Uribe  Patient identity confirmed: provided demographic data  Body area: head/neck  Location details: scalp  Laceration length: 2 cm  Foreign bodies: no foreign bodies  Anesthesia: local infiltration    Anesthesia:  Local Anesthetic: lidocaine 1% with epinephrine  Anesthetic total: 5 mL    Sedation:  Patient sedated: no      Procedure Details:  Preparation: Patient was prepped and draped in the usual sterile fashion  Irrigation solution: saline  Irrigation method: syringe  Amount of cleaning: standard  Debridement: none  Degree of undermining: none  Skin closure: staples  Number of sutures: 3 (staples)  Approximation: close  Approximation difficulty: simple  Dressing: antibiotic ointment  Patient tolerance: Patient tolerated the procedure well with no immediate complications             Phone Contacts  ED Phone Contact    ED Course  ED Course as of Dec 25 0735   Tue Dec 25, 2018   0271 D/w Dr Jalen Uribe, on call hospice, [phone] who agrees with plan for stapling, Tetanus booster, morphine for pain, R hip x-ray and no bloodwork or CT or ortho consultation  7481 R femoral neck fx nondisplaced and no dislocation read by me  XR hip/pelv 2-3 vws right   0728 D/w Dr Jalen Uribe about x-rays and he will have patient go to the [facility] this AM                                  MDM  Number of Diagnoses or Management Options  Diagnosis management comments: DDx including but not limited to: scalp laceration, intracranial injury, cervical injury, intrathoracic injury, intraabdominal injury, extremity injury--fracture, dislocation, metabolic abnormality          Amount and/or Complexity of Data Reviewed  Tests in the radiology section of CPT®: ordered and reviewed  Decide to obtain previous medical records or to obtain history from someone other than the patient: yes  Obtain history from someone other than the patient: yes  Review and summarize past medical records: yes  Discuss the patient with other providers: yes  Independent visualization of images, tracings, or specimens: yes      CritCare Time    Disposition  Final diagnoses:   Fall, initial encounter   Closed fracture of right hip, initial encounter ([facility] )   Laceration of scalp, initial encounter   Injury of head, initial encounter     Time reflects when diagnosis was documented in both MDM as applicable and the Disposition within this note     Time User Action Codes Description Comment    12/25/2018  7:32 AM Charmel Coral Add [J94  XSCA] Fall, initial encounter     12/25/2018  7:32 AM Charmel Coral Add [S72 001A] Closed fracture of right hip, initial encounter ([facility] )     12/25/2018  7:32 AM Charmel Coral Add [S01 01XA] Laceration of scalp, initial encounter     12/25/2018  7:32 AM Charmel Coral Add [S09 90XA] Injury of head, initial encounter       ED Disposition     ED Disposition Condition Comment    Discharge  Pema Myles 42 discharge to home/self care  Condition at discharge: Stable        Follow-up Information     Follow up With Specialties Details Why Contact Info    Shahnaz Medrano MD Palliative Care Call in 1 day Patient to go to [facility] this AM  If symptoms worsen  Dr Edwin Stovall will order pain medications for patient  Staples out in 7-10 days  Return sooner if worsening pain, fever, vomiting, bleeding, redness, red streaks, swelling, lethargy  [address] [place] [address] 7342 [phone]            Patient's Medications   Discharge Prescriptions    No medications on file     No discharge procedures on file      ED Provider  Electronically Signed by           Pily Cruz MD  12/25/18 6403

## 2018-12-25 NOTE — ED NOTES
Arrangements being made for pt to be transported to  [address] [place] [address], scheduled for  at  [address] [place] [address] with [facility]  Wife at bedside, EMTALA consents signed and she spoke with Hospice house triage nurse       Julio Chahal RN  12/25/18 2803

## 2018-12-29 PROBLEM — S72.301D: Status: ACTIVE | Noted: 2018-12-29

## 2018-12-29 PROBLEM — S72.001D: Status: ACTIVE | Noted: 2018-12-24

## (undated) DEVICE — TOWEL SET X-RAY

## (undated) DEVICE — AEM CORD

## (undated) DEVICE — PVC URETHRAL CATHETER: Brand: DOVER

## (undated) DEVICE — SUT PDS II 1 CTX 36 IN Z371T

## (undated) DEVICE — SUT VICRYL 3-0 SH 27 IN J416H

## (undated) DEVICE — ADHESIVE SKN CLSR HISTOACRYL FLEX 0.5ML LF

## (undated) DEVICE — SEPRA FILM 6 X 5

## (undated) DEVICE — ENDOPATH XCEL BLADELESS TROCARS WITH STABILITY SLEEVES: Brand: ENDOPATH XCEL

## (undated) DEVICE — GLOVE SRG BIOGEL ECLIPSE 7

## (undated) DEVICE — SUT VICRYL 2-0 REEL 54 IN J286G

## (undated) DEVICE — INTENDED FOR TISSUE SEPARATION, AND OTHER PROCEDURES THAT REQUIRE A SHARP SURGICAL BLADE TO PUNCTURE OR CUT.: Brand: BARD-PARKER SAFETY BLADES SIZE 15, STERILE

## (undated) DEVICE — SUT PDS II 3-0 SH 27 IN Z316H

## (undated) DEVICE — SUT VICRYL 0 UR-6 27 IN J603H

## (undated) DEVICE — UNDYED MONOFILAMENT (POLYDIOXANONE), ABSORBABLE SURGICAL SUTURE: Brand: PDS

## (undated) DEVICE — REM POLYHESIVE ADULT PATIENT RETURN ELECTRODE: Brand: VALLEYLAB

## (undated) DEVICE — CHLORAPREP HI-LITE 26ML ORANGE

## (undated) DEVICE — ANTI-FOG SOLUTION WITH FOAM PAD: Brand: DEVON

## (undated) DEVICE — 2000CC GUARDIAN II: Brand: GUARDIAN

## (undated) DEVICE — SUT PROLENE 1 TP-1 60 IN 8824G

## (undated) DEVICE — DRAIN SPONGES,6 PLY: Brand: EXCILON

## (undated) DEVICE — LIGACLIP MCA MULTIPLE CLIP APPLIERS, 20 MEDIUM CLIPS: Brand: LIGACLIP

## (undated) DEVICE — LAP AEM SCISSOR TIP .5 IN

## (undated) DEVICE — PROXIMATE RELOADABLE LINEAR CUTTER WITH SAFETY LOCK-OUT, 75MM: Brand: PROXIMATE

## (undated) DEVICE — PROXIMATE LINEAR CUTTER RELOAD, BLUE, 75MM: Brand: PROXIMATE

## (undated) DEVICE — GAUZE SPONGES,16 PLY: Brand: CURITY

## (undated) DEVICE — ENSEAL 20 CM SHAFT, LARGE JAW: Brand: ENSEAL X1

## (undated) DEVICE — INSUFFLATION TUBING PRIMFLO

## (undated) DEVICE — MEDI-VAC YANK SUCT HNDL W/TPRD BULBOUS TIP: Brand: CARDINAL HEALTH

## (undated) DEVICE — VIOLET BRAIDED (POLYGLACTIN 910), SYNTHETIC ABSORBABLE SUTURE: Brand: COATED VICRYL

## (undated) DEVICE — JP PERF DRN SIL FLT 10MM FULL: Brand: CARDINAL HEALTH

## (undated) DEVICE — JACKSON-PRATT 100CC BULB RESERVOIR: Brand: CARDINAL HEALTH

## (undated) DEVICE — ABDOMINAL PAD: Brand: DERMACEA

## (undated) DEVICE — PROXIMATE RELOADABLE LINEAR STAPLER, 60MM: Brand: PROXIMATE

## (undated) DEVICE — 3000CC GUARDIAN II: Brand: GUARDIAN

## (undated) DEVICE — UNDYED BRAIDED (POLYGLACTIN 910), SYNTHETIC ABSORBABLE SUTURE: Brand: COATED VICRYL

## (undated) DEVICE — SUT VICRYL 1 CT-1 CR/8 27 IN JJ40G

## (undated) DEVICE — SUT ETHILON 2-0 FSLX 30 IN 1674H

## (undated) DEVICE — STERILE MAJOR GENERAL PACK: Brand: CARDINAL HEALTH

## (undated) DEVICE — URIMETER 2500ML

## (undated) DEVICE — INTENDED FOR TISSUE SEPARATION, AND OTHER PROCEDURES THAT REQUIRE A SHARP SURGICAL BLADE TO PUNCTURE OR CUT.: Brand: BARD-PARKER SAFETY BLADES SIZE 11, STERILE